# Patient Record
Sex: FEMALE | Race: BLACK OR AFRICAN AMERICAN | Employment: OTHER | URBAN - METROPOLITAN AREA
[De-identification: names, ages, dates, MRNs, and addresses within clinical notes are randomized per-mention and may not be internally consistent; named-entity substitution may affect disease eponyms.]

---

## 2017-02-09 ENCOUNTER — OFFICE VISIT (OUTPATIENT)
Dept: FAMILY MEDICINE CLINIC | Age: 42
End: 2017-02-09

## 2017-02-09 VITALS
TEMPERATURE: 97.4 F | WEIGHT: 259 LBS | BODY MASS INDEX: 37.08 KG/M2 | SYSTOLIC BLOOD PRESSURE: 131 MMHG | OXYGEN SATURATION: 98 % | HEART RATE: 72 BPM | RESPIRATION RATE: 16 BRPM | HEIGHT: 70 IN | DIASTOLIC BLOOD PRESSURE: 86 MMHG

## 2017-02-09 DIAGNOSIS — I10 ESSENTIAL HYPERTENSION, BENIGN: Primary | ICD-10-CM

## 2017-02-09 DIAGNOSIS — E78.5 HYPERLIPIDEMIA, UNSPECIFIED HYPERLIPIDEMIA TYPE: ICD-10-CM

## 2017-02-09 DIAGNOSIS — L84 HEEL CALLUS: ICD-10-CM

## 2017-02-09 DIAGNOSIS — E55.9 VITAMIN D DEFICIENCY: ICD-10-CM

## 2017-02-09 DIAGNOSIS — F51.02 ADJUSTMENT INSOMNIA: ICD-10-CM

## 2017-02-09 RX ORDER — TRAZODONE HYDROCHLORIDE 50 MG/1
TABLET ORAL
Qty: 90 TAB | Refills: 3 | Status: SHIPPED | OUTPATIENT
Start: 2017-02-09 | End: 2018-04-12 | Stop reason: SDUPTHER

## 2017-02-09 NOTE — MR AVS SNAPSHOT
Visit Information Date & Time Provider Department Dept. Phone Encounter #  
 2/9/2017  8:30 AM Bashir Mejía MD [address] [id number] Follow-up Instructions Return in about 3 months (around 5/9/2017) for follow up; schedule with Dr. Iwona Waggoner for follow up/PAP. Your Appointments 8/2/2017  8:30 AM  
Office Visit with Bashir Mejía MD  
[place] [address] ([facility]) Appt Note: mwv  
 [address]  
  
   
 [address] [facility] Upcoming Health Maintenance Date Due DTaP/Tdap/Td series (1 - Tdap) 8/29/1995 PAP AKA CERVICAL CYTOLOGY 4/10/2017 Allergies as of 2/9/2017  Review Complete On: 2/9/2017 By: Bashir Mejía MD  
 No Known Allergies Current Immunizations  Reviewed on 8/2/2016 No immunizations on file. Not reviewed this visit You Were Diagnosed With   
  
 Codes Comments Essential hypertension, benign    -  Primary ICD-10-CM: I10 
ICD-9-CM: 401.1 Hyperlipidemia, unspecified hyperlipidemia type     ICD-10-CM: E78.5 ICD-9-CM: 272.4 Vitamin D deficiency     ICD-10-CM: E55.9 ICD-9-CM: 268.9 Adjustment insomnia     ICD-10-CM: F51.02 
ICD-9-CM: 307.41 Heel callus     ICD-10-CM: L84 
ICD-9-CM: 078 Vitals BP Pulse Temp Resp Height(growth percentile) Weight(growth percentile) 131/86 72 97.4 °F (36.3 °C) (Oral) 16 5' 10\" (1.778 m) 259 lb (117.5 kg) LMP SpO2 BMI OB Status Smoking Status 02/02/2017 (Within Days) 98% 37.16 kg/m2 Having regular periods Current Every Day Smoker Vitals History BMI and BSA Data Body Mass Index Body Surface Area  
 37.16 kg/m 2 2.41 m 2 Preferred Pharmacy Pharmacy Name Phone [facility] [address] [facility], [address] [place] [address] [phone] Your Updated Medication List  
  
   
 This list is accurate as of: 2/9/17  8:51 AM.  Always use your most recent med list. amLODIPine 5 mg tablet Commonly known as:  Arlyss [place] Take 1 Tab by mouth daily. buPROPion  mg SR tablet Commonly known as:  WELLBUTRIN SR  
TAKE 1 TABLET BY MOUTH TWICE DAILY FOR SMOKING CESSATION  
  
 ergocalciferol 50,000 unit capsule Commonly known as:  ERGOCALCIFEROL Take 1 Cap by mouth every seven (7) days. traZODone 50 mg tablet Commonly known as:  DESYREL  
TAKE 1 TABLET BY MOUTH EVERY NIGHT  
  
 valsartan-hydroCHLOROthiazide 80-12.5 mg per tablet Commonly known as:  DIOVAN HCT Take 1 Tab by mouth daily. Prescriptions Sent to Pharmacy Refills  
 traZODone (DESYREL) 50 mg tablet 3 Sig: TAKE 1 TABLET BY MOUTH EVERY NIGHT Class: Normal  
 Pharmacy: [facility] [address] [place] [address] #: [phone] We Performed the Following REFERRAL TO PODIATRY [REF90 Custom] Comments:  
 Please evaluate patient for painful callus on the bottom of the right heel Follow-up Instructions Return in about 3 months (around 5/9/2017) for follow up; schedule with Dr. Gabby Haney for follow up/PAP. Referral Information Referral ID Referred By Referred To  
  
 [id number] Billy Ramirez Not Available Visits Status Start Date End Date 1 New Request 2/9/17 2/9/18 If your referral has a status of pending review or denied, additional information will be sent to support the outcome of this decision. Patient Instructions Corns and Calluses: Care Instructions Your Care Instructions Corns and calluses are areas of thick, hard, dead skin. They form to protect your skin from injury. Corns usually form where toes rub together. Calluses often form on the hands or feet. They may form wherever the skin rubs against something, such as shoes. In most cases, you can take steps at home to care for a corn or callus. Follow-up care is a key part of your treatment and safety. Be sure to make and go to all appointments, and call your doctor if you are having problems. It's also a good idea to know your test results and keep a list of the medicines you take. How can you care for yourself at home? · Wear shoes and other footwear that fit correctly and are roomy. This will reduce rubbing and give corns or calluses time to heal. 
· Use protective pads, such as moleskin, to cushion the callus or corn. · Soften the corn or callus and remove the dead skin by using over-the-counter products such as salicylic acid. If you have a condition that causes problems with blood flow (such as coronary artery disease) or loss of feeling in your feet (such as diabetes), talk to your doctor before you try any home treatment. · Soak your corn or callus in warm water, and then use a pumice stone to rub dead skin away. · Wash your feet regularly, and rub lotion into your feet while they are still moist. Dry skin can cause a callus to crack and bleed. · Never cut the corn or callus yourself, especially if you have problems with blood flow to your legs or feet. When should you call for help? Call your doctor now or seek immediate medical care if: 
· You have signs of infection, such as: 
¨ Increased pain, swelling, warmth, or redness around the corn or callus. ¨ Red streaks leading from the corn or callus. ¨ Pus draining from the corn or callus. ¨ A fever. Watch closely for changes in your health, and be sure to contact your doctor if: 
· You have a blood flow problem, such as diabetes, and your corn or callus is bothering you. · You do not get better as expected. Where can you learn more? Go to http://noelle-vaughn.info/. Enter R244 in the search box to learn more about \"Corns and Calluses: Care Instructions. \" Current as of: February 5, 2016 Content Version: 11.1 © 3887-8645 [employer]. Care instructions adapted under license by [employer] (which disclaims liability or warranty for this information). If you have questions about a medical condition or this instruction, always ask your healthcare professional. [address] any warranty or liability for your use of this information. Introducing [facility]! Dear Zunilda Singletary: Thank you for requesting a [employer] account. Our records indicate that you already have an active [employer] account. You can access your account anytime at [email or web address]. [email or web address]/7 Did you know that you can access your hospital and ER discharge instructions at any time in Global [employer]? You can also review all of your test results from your hospital stay or ER visit. Additional Information If you have questions, please visit the Frequently Asked Questions section of the [employer] website at [email or web address] Remember, [employer] is NOT to be used for urgent needs. For medical emergencies, dial 911. Now available from your iPhone and Android! Please provide this summary of care documentation to your next provider. Your primary care clinician is listed as Sj Angel. If you have any questions after today's visit, please call [phone].

## 2017-02-09 NOTE — PROGRESS NOTES
Lucas Flower is here today to follow up for chronic conditions. 1. Have you been to the ER, urgent care clinic since your last visit? Hospitalized since your last visit? No    2. Have you seen or consulted any other health care providers outside of the [address] [place] [address] since your last visit? Include any pap smears or colon screening.  No
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
## 2017-02-09 NOTE — PATIENT INSTRUCTIONS
Corns and Calluses: Care Instructions  Your Care Instructions  Corns and calluses are areas of thick, hard, dead skin. They form to protect your skin from injury. Corns usually form where toes rub together. Calluses often form on the hands or feet. They may form wherever the skin rubs against something, such as shoes. In most cases, you can take steps at home to care for a corn or callus. Follow-up care is a key part of your treatment and safety. Be sure to make and go to all appointments, and call your doctor if you are having problems. It's also a good idea to know your test results and keep a list of the medicines you take. How can you care for yourself at home? · Wear shoes and other footwear that fit correctly and are roomy. This will reduce rubbing and give corns or calluses time to heal.  · Use protective pads, such as moleskin, to cushion the callus or corn. · Soften the corn or callus and remove the dead skin by using over-the-counter products such as salicylic acid. If you have a condition that causes problems with blood flow (such as coronary artery disease) or loss of feeling in your feet (such as diabetes), talk to your doctor before you try any home treatment. · Soak your corn or callus in warm water, and then use a pumice stone to rub dead skin away. · Wash your feet regularly, and rub lotion into your feet while they are still moist. Dry skin can cause a callus to crack and bleed. · Never cut the corn or callus yourself, especially if you have problems with blood flow to your legs or feet. When should you call for help? Call your doctor now or seek immediate medical care if:  · You have signs of infection, such as:  ¨ Increased pain, swelling, warmth, or redness around the corn or callus. ¨ Red streaks leading from the corn or callus. ¨ Pus draining from the corn or callus. ¨ A fever.   Watch closely for changes in your health, and be sure to contact your doctor if:  · You have a blood flow problem, such as diabetes, and your corn or callus is bothering you. · You do not get better as expected. Where can you learn more? Go to http://noelle-vaughn.info/. Enter R244 in the search box to learn more about \"Corns and Calluses: Care Instructions. \"  Current as of: February 5, 2016  Content Version: 11.1  © 5269-8725 [employer]. Care instructions adapted under license by [employer] (which disclaims liability or warranty for this information). If you have questions about a medical condition or this instruction, always ask your healthcare professional. [address] any warranty or liability for your use of this information.

## 2017-04-04 DIAGNOSIS — E55.9 VITAMIN D DEFICIENCY: ICD-10-CM

## 2017-04-04 RX ORDER — ERGOCALCIFEROL 1.25 MG/1
CAPSULE ORAL
Qty: 12 CAP | Refills: 0 | Status: SHIPPED | OUTPATIENT
Start: 2017-04-04 | End: 2017-10-29 | Stop reason: SDUPTHER

## 2017-04-19 ENCOUNTER — TELEPHONE (OUTPATIENT)
Dept: FAMILY MEDICINE CLINIC | Age: 42
End: 2017-04-19

## 2017-04-19 NOTE — TELEPHONE ENCOUNTER
Patient states she cant see out of her eye and needs a referral to [facility] TODAY. Please call [phone]

## 2017-04-20 ENCOUNTER — HOSPITAL ENCOUNTER (EMERGENCY)
Age: 42
Discharge: HOME | End: 2017-04-20
Attending: EMERGENCY MEDICINE
Payer: MEDICARE

## 2017-04-20 ENCOUNTER — APPOINTMENT (OUTPATIENT)
Dept: GENERAL RADIOLOGY | Age: 42
End: 2017-04-20
Attending: EMERGENCY MEDICINE
Payer: MEDICARE

## 2017-04-20 VITALS
HEART RATE: 105 BPM | OXYGEN SATURATION: 100 % | HEIGHT: 70 IN | BODY MASS INDEX: 36.22 KG/M2 | WEIGHT: 253 LBS | DIASTOLIC BLOOD PRESSURE: 100 MMHG | SYSTOLIC BLOOD PRESSURE: 155 MMHG | TEMPERATURE: 98.9 F | RESPIRATION RATE: 16 BRPM

## 2017-04-20 DIAGNOSIS — H54.7 VISION LOSS: Primary | ICD-10-CM

## 2017-04-20 DIAGNOSIS — S90.122A CONTUSION OF LEFT LESSER TOE(S) WITHOUT DAMAGE TO NAIL, INITIAL ENCOUNTER: Primary | ICD-10-CM

## 2017-04-20 DIAGNOSIS — I10 HYPERTENSION, UNCONTROLLED: ICD-10-CM

## 2017-04-20 PROCEDURE — 99282 EMERGENCY DEPT VISIT SF MDM: CPT

## 2017-04-20 PROCEDURE — 77030036687 HC SHOE PSTOP S2SG -A

## 2017-04-20 PROCEDURE — 73630 X-RAY EXAM OF FOOT: CPT

## 2017-04-20 PROCEDURE — 74011250637 HC RX REV CODE- 250/637: Performed by: EMERGENCY MEDICINE

## 2017-04-20 RX ORDER — IBUPROFEN 400 MG/1
800 TABLET ORAL
Status: COMPLETED | OUTPATIENT
Start: 2017-04-20 | End: 2017-04-20

## 2017-04-20 RX ORDER — OXYCODONE AND ACETAMINOPHEN 5; 325 MG/1; MG/1
1 TABLET ORAL
Status: DISCONTINUED | OUTPATIENT
Start: 2017-04-20 | End: 2017-04-20

## 2017-04-20 RX ORDER — KETOROLAC TROMETHAMINE 10 MG/1
10 TABLET, FILM COATED ORAL EVERY 8 HOURS
Qty: 15 TAB | Refills: 0 | Status: SHIPPED | OUTPATIENT
Start: 2017-04-20 | End: 2017-04-25

## 2017-04-20 RX ORDER — HYDROCODONE BITARTRATE AND ACETAMINOPHEN 5; 325 MG/1; MG/1
TABLET ORAL
Qty: 20 TAB | Refills: 0 | Status: SHIPPED | OUTPATIENT
Start: 2017-04-20 | End: 2017-04-20

## 2017-04-20 RX ADMIN — IBUPROFEN 800 MG: 400 TABLET, FILM COATED ORAL at 18:46
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
## 2017-04-20 NOTE — ED NOTES
Patient states,\"I don't take no percocet. You all should know that. \" Offered support to patient, notified DR Terence Abreu.

## 2017-04-20 NOTE — ED NOTES
Patient states,\"I don't understand how come it's taking so long. You people are crazy here. Patients got shit to do, too. No reason I should be waiting around. I esa here for over three hours. \" Offered support to patient, notified DR Vincent Tesfaye. Called radiology department to inquire when patient would be able to come to radiology, radiology tech [facility] states she will come to retreive patient in a few minutes.

## 2017-04-21 NOTE — TELEPHONE ENCOUNTER
Received [employer] authorization, auth # [id number], patient approved for 99 visits with Dr. Eliseo Boateng. Start date 04/20/18 End date 04/20/18. Bao [place] authorization to [facility] @  Fax [phone] with confirmation of receipt received.

## 2017-04-21 NOTE — DISCHARGE INSTRUCTIONS
DASH Diet: Care Instructions  Your Care Instructions  The DASH diet is an eating plan that can help lower your blood pressure. DASH stands for Dietary Approaches to Stop Hypertension. Hypertension is high blood pressure. The DASH diet focuses on eating foods that are high in calcium, potassium, and magnesium. These nutrients can lower blood pressure. The foods that are highest in these nutrients are fruits, vegetables, low-fat dairy products, nuts, seeds, and legumes. But taking calcium, potassium, and magnesium supplements instead of eating foods that are high in those nutrients does not have the same effect. The DASH diet also includes whole grains, fish, and poultry. The DASH diet is one of several lifestyle changes your doctor may recommend to lower your high blood pressure. Your doctor may also want you to decrease the amount of sodium in your diet. Lowering sodium while following the DASH diet can lower blood pressure even further than just the DASH diet alone. Follow-up care is a key part of your treatment and safety. Be sure to make and go to all appointments, and call your doctor if you are having problems. It's also a good idea to know your test results and keep a list of the medicines you take. How can you care for yourself at home? Following the DASH diet  · Eat 4 to 5 servings of fruit each day. A serving is 1 medium-sized piece of fruit, ½ cup chopped or canned fruit, 1/4 cup dried fruit, or 4 ounces (½ cup) of fruit juice. Choose fruit more often than fruit juice. · Eat 4 to 5 servings of vegetables each day. A serving is 1 cup of lettuce or raw leafy vegetables, ½ cup of chopped or cooked vegetables, or 4 ounces (½ cup) of vegetable juice. Choose vegetables more often than vegetable juice. · Get 2 to 3 servings of low-fat and fat-free dairy each day. A serving is 8 ounces of milk, 1 cup of yogurt, or 1 ½ ounces of cheese. · Eat 6 to 8 servings of grains each day.  A serving is 1 slice of bread, 1 ounce of dry cereal, or ½ cup of cooked rice, pasta, or cooked cereal. Try to choose whole-grain products as much as possible. · Limit lean meat, poultry, and fish to 2 servings each day. A serving is 3 ounces, about the size of a deck of cards. · Eat 4 to 5 servings of nuts, seeds, and legumes (cooked dried beans, lentils, and split peas) each week. A serving is 1/3 cup of nuts, 2 tablespoons of seeds, or ½ cup of cooked beans or peas. · Limit fats and oils to 2 to 3 servings each day. A serving is 1 teaspoon of vegetable oil or 2 tablespoons of salad dressing. · Limit sweets and added sugars to 5 servings or less a week. A serving is 1 tablespoon jelly or jam, ½ cup sorbet, or 1 cup of lemonade. · Eat less than 2,300 milligrams (mg) of sodium a day. If you limit your sodium to 1,500 mg a day, you can lower your blood pressure even more. Tips for success  · Start small. Do not try to make dramatic changes to your diet all at once. You might feel that you are missing out on your favorite foods and then be more likely to not follow the plan. Make small changes, and stick with them. Once those changes become habit, add a few more changes. · Try some of the following:  ¨ Make it a goal to eat a fruit or vegetable at every meal and at snacks. This will make it easy to get the recommended amount of fruits and vegetables each day. ¨ Try yogurt topped with fruit and nuts for a snack or healthy dessert. ¨ Add lettuce, tomato, cucumber, and onion to sandwiches. ¨ Combine a ready-made pizza crust with low-fat mozzarella cheese and lots of vegetable toppings. Try using tomatoes, squash, spinach, broccoli, carrots, cauliflower, and onions. ¨ Have a variety of cut-up vegetables with a low-fat dip as an appetizer instead of chips and dip. ¨ Sprinkle sunflower seeds or chopped almonds over salads. Or try adding chopped walnuts or almonds to cooked vegetables. ¨ Try some vegetarian meals using beans and peas. Add garbanzo or kidney beans to salads. Make burritos and tacos with mashed medel beans or black beans. Where can you learn more? Go to http://noelle-vaughn.info/. Enter U165 in the search box to learn more about \"DASH Diet: Care Instructions. \"  Current as of: March 23, 2016  Content Version: 11.2  © 7314-4899 [employer]. Care instructions adapted under license by [employer] (which disclaims liability or warranty for this information). If you have questions about a medical condition or this instruction, always ask your healthcare professional. [place] [address] any warranty or liability for your use of this information. High Blood Pressure: Care Instructions  Your Care Instructions  If your blood pressure is usually above 140/90, you have high blood pressure, or hypertension. That means the top number is 140 or higher or the bottom number is 90 or higher, or both. Despite what a lot of people think, high blood pressure usually doesn't cause headaches or make you feel dizzy or lightheaded. It usually has no symptoms. But it does increase your risk for heart attack, stroke, and kidney or eye damage. The higher your blood pressure, the more your risk increases. Your doctor will give you a goal for your blood pressure. Your goal will be based on your health and your age. An example of a goal is to keep your blood pressure below 140/90. Lifestyle changes, such as eating healthy and being active, are always important to help lower blood pressure. You might also take medicine to reach your blood pressure goal.  Follow-up care is a key part of your treatment and safety. Be sure to make and go to all appointments, and call your doctor if you are having problems. It's also a good idea to know your test results and keep a list of the medicines you take. How can you care for yourself at home?   Medical treatment  · If you stop taking your medicine, your blood pressure will go back up. You may take one or more types of medicine to lower your blood pressure. Be safe with medicines. Take your medicine exactly as prescribed. Call your doctor if you think you are having a problem with your medicine. · Talk to your doctor before you start taking aspirin every day. Aspirin can help certain people lower their risk of a heart attack or stroke. But taking aspirin isn't right for everyone, because it can cause serious bleeding. · See your doctor regularly. You may need to see the doctor more often at first or until your blood pressure comes down. · If you are taking blood pressure medicine, talk to your doctor before you take decongestants or anti-inflammatory medicine, such as ibuprofen. Some of these medicines can raise blood pressure. · Learn how to check your blood pressure at home. Lifestyle changes  · Stay at a healthy weight. This is especially important if you put on weight around the waist. Losing even 10 pounds can help you lower your blood pressure. · If your doctor recommends it, get more exercise. Walking is a good choice. Bit by bit, increase the amount you walk every day. Try for at least 30 minutes on most days of the week. You also may want to swim, bike, or do other activities. · Avoid or limit alcohol. Talk to your doctor about whether you can drink any alcohol. · Try to limit how much sodium you eat to less than 2,300 milligrams (mg) a day. Your doctor may ask you to try to eat less than 1,500 mg a day. · Eat plenty of fruits (such as bananas and oranges), vegetables, legumes, whole grains, and low-fat dairy products. · Lower the amount of saturated fat in your diet. Saturated fat is found in animal products such as milk, cheese, and meat. Limiting these foods may help you lose weight and also lower your risk for heart disease. · Do not smoke. Smoking increases your risk for heart attack and stroke.  If you need help quitting, talk to your doctor about stop-smoking programs and medicines. These can increase your chances of quitting for good. When should you call for help? Call 911 anytime you think you may need emergency care. This may mean having symptoms that suggest that your blood pressure is causing a serious heart or blood vessel problem. Your blood pressure may be over 180/110. For example, call 911 if:  · You have symptoms of a heart attack. These may include:  ¨ Chest pain or pressure, or a strange feeling in the chest.  ¨ Sweating. ¨ Shortness of breath. ¨ Nausea or vomiting. ¨ Pain, pressure, or a strange feeling in the back, neck, jaw, or upper belly or in one or both shoulders or arms. ¨ Lightheadedness or sudden weakness. ¨ A fast or irregular heartbeat. · You have symptoms of a stroke. These may include:  ¨ Sudden numbness, tingling, weakness, or loss of movement in your face, arm, or leg, especially on only one side of your body. ¨ Sudden vision changes. ¨ Sudden trouble speaking. ¨ Sudden confusion or trouble understanding simple statements. ¨ Sudden problems with walking or balance. ¨ A sudden, severe headache that is different from past headaches. · You have severe back or belly pain. Do not wait until your blood pressure comes down on its own. Get help right away. Call your doctor now or seek immediate care if:  · Your blood pressure is much higher than normal (such as 180/110 or higher), but you don't have symptoms. · You think high blood pressure is causing symptoms, such as:  ¨ Severe headache. ¨ Blurry vision. Watch closely for changes in your health, and be sure to contact your doctor if:  · Your blood pressure measures 140/90 or higher at least 2 times. That means the top number is 140 or higher or the bottom number is 90 or higher, or both. · You think you may be having side effects from your blood pressure medicine. · Your blood pressure is usually normal, but it goes above normal at least 2 times.   Where can you learn more? Go to http://noelle-vaughn.info/. Enter M205 in the search box to learn more about \"High Blood Pressure: Care Instructions. \"  Current as of: August 8, 2016  Content Version: 11.2  © 4723-8151 [employer]. Care instructions adapted under license by [employer] (which disclaims liability or warranty for this information). If you have questions about a medical condition or this instruction, always ask your healthcare professional. [place] [address] any warranty or liability for your use of this information.

## 2017-04-24 ENCOUNTER — TELEPHONE (OUTPATIENT)
Dept: FAMILY MEDICINE CLINIC | Age: 42
End: 2017-04-24

## 2017-04-24 NOTE — TELEPHONE ENCOUNTER
Calling because she broke her 4 & 5 toes on her left foot a few days ago.  Went to [facility], she has been referred to [place] [facility] but they are not scheduling her an appt without a referral. She can be reached at [phone]

## 2017-04-24 NOTE — ED NOTES
8:19 AM  Pt called back for radiology over read referred to Dr Femi Xie for ED follow up for FX toes.

## 2017-04-25 ENCOUNTER — TELEPHONE (OUTPATIENT)
Dept: FAMILY MEDICINE CLINIC | Age: 42
End: 2017-04-25

## 2017-04-25 DIAGNOSIS — S92.515A: Primary | ICD-10-CM

## 2017-04-25 NOTE — TELEPHONE ENCOUNTER
Patient's [employer] authorized, and her appointment scheduled for tomorrow with Dr Dayanara Wilks. Patient advised, very happy.

## 2017-04-25 NOTE — TELEPHONE ENCOUNTER
Patient states she needs a referral to [address] [place] [address] at [place]. She states she broke two toes and they wont allow her to make a appointment until they get the referral.Please fax to [phone] or [phone]

## 2017-04-28 NOTE — TELEPHONE ENCOUNTER
Referral for [address] was obtained on 04/25/17 for Dr. Scott Farmer for patient to be seen.  Documented in referral.

## 2017-05-09 ENCOUNTER — OFFICE VISIT (OUTPATIENT)
Dept: FAMILY MEDICINE CLINIC | Age: 42
End: 2017-05-09

## 2017-05-09 VITALS
HEIGHT: 70 IN | TEMPERATURE: 97.7 F | WEIGHT: 258 LBS | HEART RATE: 76 BPM | DIASTOLIC BLOOD PRESSURE: 80 MMHG | SYSTOLIC BLOOD PRESSURE: 146 MMHG | RESPIRATION RATE: 16 BRPM | BODY MASS INDEX: 36.94 KG/M2

## 2017-05-09 DIAGNOSIS — J01.90 ACUTE SINUSITIS, UNSPECIFIED: ICD-10-CM

## 2017-05-09 DIAGNOSIS — E55.9 VITAMIN D DEFICIENCY: ICD-10-CM

## 2017-05-09 DIAGNOSIS — I10 ESSENTIAL HYPERTENSION, BENIGN: ICD-10-CM

## 2017-05-09 DIAGNOSIS — M79.671 PAIN IN RIGHT FOOT: Primary | ICD-10-CM

## 2017-05-09 DIAGNOSIS — Z72.0 TOBACCO ABUSE: ICD-10-CM

## 2017-05-09 DIAGNOSIS — H10.11 ALLERGIC CONJUNCTIVITIS, RIGHT EYE: ICD-10-CM

## 2017-05-09 DIAGNOSIS — R73.01 IMPAIRED FASTING GLUCOSE: ICD-10-CM

## 2017-05-09 DIAGNOSIS — E78.5 HYPERLIPIDEMIA, UNSPECIFIED HYPERLIPIDEMIA TYPE: ICD-10-CM

## 2017-05-09 RX ORDER — IBUPROFEN 200 MG
TABLET ORAL
COMMUNITY
End: 2018-04-12 | Stop reason: ALTCHOICE

## 2017-05-09 RX ORDER — AZITHROMYCIN 250 MG/1
TABLET, FILM COATED ORAL
Qty: 6 TAB | Refills: 0 | Status: SHIPPED | OUTPATIENT
Start: 2017-05-09 | End: 2017-05-14

## 2017-05-09 RX ORDER — DIPHENHYDRAMINE HCL 25 MG
25 CAPSULE ORAL AS NEEDED
COMMUNITY
End: 2019-11-04

## 2017-05-09 NOTE — MR AVS SNAPSHOT
Visit Information Date & Time Provider Department Dept. Phone Encounter #  
 5/9/2017  8:45 AM Randy Gil MD [facility] [id number] Follow-up Instructions Return in about 3 months (around 8/9/2017) for follow up. Routing History Your Appointments 8/2/2017  8:30 AM  
Office Visit with Randy Gil MD  
[place] [address] ([facility]) Appt Note: mwv  
 [address]  
  
   
 [address] Upcoming Health Maintenance Date Due DTaP/Tdap/Td series (1 - Tdap) 8/29/1995 PAP AKA CERVICAL CYTOLOGY 4/10/2017 INFLUENZA AGE 9 TO ADULT 8/1/2017 Allergies as of 5/9/2017  Review Complete On: 5/9/2017 By: Randy Gil MD  
 No Known Allergies Current Immunizations  Reviewed on 8/2/2016 No immunizations on file. Not reviewed this visit You Were Diagnosed With   
  
 Codes Comments Pain in right foot    -  Primary ICD-10-CM: F50.172 ICD-9-CM: 729.5 Essential hypertension, benign     ICD-10-CM: I10 
ICD-9-CM: 401.1 Tobacco abuse     ICD-10-CM: Z72.0 ICD-9-CM: 305.1 Hyperlipidemia, unspecified hyperlipidemia type     ICD-10-CM: E78.5 ICD-9-CM: 272.4 Vitamin D deficiency     ICD-10-CM: E55.9 ICD-9-CM: 268.9 Impaired fasting glucose     ICD-10-CM: R73.01 
ICD-9-CM: 790.21 Allergic conjunctivitis, right eye     ICD-10-CM: H10.11 ICD-9-CM: 372.14 Acute sinusitis, unspecified     ICD-10-CM: J01.90 ICD-9-CM: 461.9 Vitals BP Pulse Temp Resp Height(growth percentile) Weight(growth percentile) 146/80 76 97.7 °F (36.5 °C) (Oral) 16 5' 10\" (1.778 m) 258 lb (117 kg) LMP BMI OB Status Smoking Status 05/04/2017 37.02 kg/m2 Having regular periods Current Every Day Smoker BMI and BSA Data Body Mass Index Body Surface Area  37.02 kg/m 2 2.4 m 2  
  
  
 Preferred Pharmacy Pharmacy Name Phone [facility] [address] [phone] Your Updated Medication List  
  
   
This list is accurate as of: 5/9/17  9:14 AM.  Always use your most recent med list. amLODIPine 5 mg tablet Commonly known as:  Paul Luong Take 1 Tab by mouth daily. azithromycin 250 mg tablet Commonly known as:  Spalding Margo Take 2 tablets today, then take 1 tablet daily BENADRYL 25 mg capsule Generic drug:  diphenhydrAMINE Take 25 mg by mouth two (2) times a day. buPROPion  mg SR tablet Commonly known as:  WELLBUTRIN SR  
TAKE 1 TABLET BY MOUTH TWICE DAILY FOR SMOKING CESSATION  
  
 ergocalciferol 50,000 unit capsule Commonly known as:  ERGOCALCIFEROL  
TAKE 1 CAPSULE BY MOUTH EVERY 7 DAYS  
  
 ibuprofen 200 mg tablet Commonly known as:  MOTRIN Take  by mouth every eight (8) hours as needed for Pain. traZODone 50 mg tablet Commonly known as:  DESYREL  
TAKE 1 TABLET BY MOUTH EVERY NIGHT  
  
 valsartan-hydroCHLOROthiazide 80-12.5 mg per tablet Commonly known as:  DIOVAN HCT Take 1 Tab by mouth daily. Prescriptions Sent to Pharmacy Refills  
 azithromycin (ZITHROMAX) 250 mg tablet 0 Sig: Take 2 tablets today, then take 1 tablet daily Class: Normal  
 Pharmacy: [facility] [address] [place] [address], [address] [place] [address] #: [phone] Follow-up Instructions Return in about 3 months (around 8/9/2017) for follow up. To-Do List   
 05/09/2017 Imaging:  XR FOOT RT MIN 3 V   
  
 05/09/2017 9:15 AM  
  Appointment with [facility] RAD XR RM 1 at [address] [place] [address] ([phone]) Patient Instructions DASH Diet: Care Instructions Your Care Instructions The DASH diet is an eating plan that can help lower your blood pressure. DASH stands for Dietary Approaches to Stop Hypertension. Hypertension is high blood pressure. The DASH diet focuses on eating foods that are high in calcium, potassium, and magnesium. These nutrients can lower blood pressure. The foods that are highest in these nutrients are fruits, vegetables, low-fat dairy products, nuts, seeds, and legumes. But taking calcium, potassium, and magnesium supplements instead of eating foods that are high in those nutrients does not have the same effect. The DASH diet also includes whole grains, fish, and poultry. The DASH diet is one of several lifestyle changes your doctor may recommend to lower your high blood pressure. Your doctor may also want you to decrease the amount of sodium in your diet. Lowering sodium while following the DASH diet can lower blood pressure even further than just the DASH diet alone. Follow-up care is a key part of your treatment and safety. Be sure to make and go to all appointments, and call your doctor if you are having problems. It's also a good idea to know your test results and keep a list of the medicines you take. How can you care for yourself at home? Following the DASH diet · Eat 4 to 5 servings of fruit each day. A serving is 1 medium-sized piece of fruit, ½ cup chopped or canned fruit, 1/4 cup dried fruit, or 4 ounces (½ cup) of fruit juice. Choose fruit more often than fruit juice. · Eat 4 to 5 servings of vegetables each day. A serving is 1 cup of lettuce or raw leafy vegetables, ½ cup of chopped or cooked vegetables, or 4 ounces (½ cup) of vegetable juice. Choose vegetables more often than vegetable juice. · Get 2 to 3 servings of low-fat and fat-free dairy each day. A serving is 8 ounces of milk, 1 cup of yogurt, or 1 ½ ounces of cheese. · Eat 6 to 8 servings of grains each day.  A serving is 1 slice of bread, 1 ounce of dry cereal, or ½ cup of cooked rice, pasta, or cooked cereal. Try to choose whole-grain products as much as possible. · Limit lean meat, poultry, and fish to 2 servings each day. A serving is 3 ounces, about the size of a deck of cards. · Eat 4 to 5 servings of nuts, seeds, and legumes (cooked dried beans, lentils, and split peas) each week. A serving is 1/3 cup of nuts, 2 tablespoons of seeds, or ½ cup of cooked beans or peas. · Limit fats and oils to 2 to 3 servings each day. A serving is 1 teaspoon of vegetable oil or 2 tablespoons of salad dressing. · Limit sweets and added sugars to 5 servings or less a week. A serving is 1 tablespoon jelly or jam, ½ cup sorbet, or 1 cup of lemonade. · Eat less than 2,300 milligrams (mg) of sodium a day. If you limit your sodium to 1,500 mg a day, you can lower your blood pressure even more. Tips for success · Start small. Do not try to make dramatic changes to your diet all at once. You might feel that you are missing out on your favorite foods and then be more likely to not follow the plan. Make small changes, and stick with them. Once those changes become habit, add a few more changes. · Try some of the following: ¨ Make it a goal to eat a fruit or vegetable at every meal and at snacks. This will make it easy to get the recommended amount of fruits and vegetables each day. ¨ Try yogurt topped with fruit and nuts for a snack or healthy dessert. ¨ Add lettuce, tomato, cucumber, and onion to sandwiches. ¨ Combine a ready-made pizza crust with low-fat mozzarella cheese and lots of vegetable toppings. Try using tomatoes, squash, spinach, broccoli, carrots, cauliflower, and onions. ¨ Have a variety of cut-up vegetables with a low-fat dip as an appetizer instead of chips and dip. ¨ Sprinkle sunflower seeds or chopped almonds over salads. Or try adding chopped walnuts or almonds to cooked vegetables. ¨ Try some vegetarian meals using beans and peas.  Add garbanzo or kidney beans to salads. Make burritos and tacos with mashed medel beans or black beans. Where can you learn more? Go to http://noelle-vaughn.info/. Enter J399 in the search box to learn more about \"DASH Diet: Care Instructions. \" Current as of: March 23, 2016 Content Version: 11.2 © 9155-0823 [employer]. Care instructions adapted under license by [employer] (which disclaims liability or warranty for this information). If you have questions about a medical condition or this instruction, always ask your healthcare professional. [place] [address] any warranty or liability for your use of this information. Introducing [facility]! Dear Celine Casillas: Thank you for requesting a [employer] account. Our records indicate that you already have an active Collect[employer] account. You can access your account anytime at [email or web address] Did you know that you can access your hospital and ER discharge instructions at any time in Collect[employer]ias? You can also review all of your test results from your hospital stay or ER visit. Additional Information If you have questions, please visit the Frequently Asked Questions section of the Collective [employer]ias website at [email or web address] Remember, Collective Bias is NOT to be used for urgent needs. For medical emergencies, dial 911. Now available from your iPhone and Android! Please provide this summary of care documentation to your next provider. Your primary care clinician is listed as Jada Lopez. If you have any questions after today's visit, please call [phone].

## 2017-05-09 NOTE — PROGRESS NOTES
1. Have you been to the ER, urgent care clinic since your last visit? Hospitalized since your last visit? Yes, went to [place] [address] on 4/2017 for fractured 4th and 5th digit left foot. 2. Have you seen or consulted any other health care providers outside of the [address] [place] [address] since your last visit? Include any pap smears or colon screening. Yes, saw Dr. Amy Domingo on 4/26/2017. Patient presents with follow up Hypertension, Hyperlipidemia, Vitamin D deficiency and Nicotine dependence.
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
## 2017-05-09 NOTE — PATIENT INSTRUCTIONS

## 2017-05-10 LAB
25(OH)D3+25(OH)D2 SERPL-MCNC: 14.2 NG/ML (ref 30–100)
ALBUMIN SERPL-MCNC: 4.5 G/DL (ref 3.5–5.5)
ALBUMIN/GLOB SERPL: 1.7 {RATIO} (ref 1.2–2.2)
ALP SERPL-CCNC: 123 IU/L (ref 39–117)
ALT SERPL-CCNC: 40 IU/L (ref 0–32)
AST SERPL-CCNC: 30 IU/L (ref 0–40)
BASOPHILS # BLD AUTO: 0 X10E3/UL (ref 0–0.2)
BASOPHILS NFR BLD AUTO: 0 %
BILIRUB SERPL-MCNC: 0.3 MG/DL (ref 0–1.2)
BUN SERPL-MCNC: 12 MG/DL (ref 6–24)
BUN/CREAT SERPL: 11 (ref 9–23)
CALCIUM SERPL-MCNC: 9.4 MG/DL (ref 8.7–10.2)
CHLORIDE SERPL-SCNC: 99 MMOL/L (ref 96–106)
CHOLEST SERPL-MCNC: 201 MG/DL (ref 100–199)
CO2 SERPL-SCNC: 24 MMOL/L (ref 18–29)
CREAT SERPL-MCNC: 1.06 MG/DL (ref 0.57–1)
EOSINOPHIL # BLD AUTO: 0.1 X10E3/UL (ref 0–0.4)
EOSINOPHIL NFR BLD AUTO: 2 %
ERYTHROCYTE [DISTWIDTH] IN BLOOD BY AUTOMATED COUNT: 13.2 % (ref 12.3–15.4)
GLOBULIN SER CALC-MCNC: 2.7 G/DL (ref 1.5–4.5)
GLUCOSE SERPL-MCNC: 107 MG/DL (ref 65–99)
HBA1C MFR BLD: 5.9 % (ref 4.8–5.6)
HCT VFR BLD AUTO: 38.8 % (ref 34–46.6)
HDLC SERPL-MCNC: 50 MG/DL
HGB BLD-MCNC: 12.4 G/DL (ref 11.1–15.9)
IMM GRANULOCYTES # BLD: 0 X10E3/UL (ref 0–0.1)
IMM GRANULOCYTES NFR BLD: 0 %
INTERPRETATION, 910389: NORMAL
LDLC SERPL CALC-MCNC: 116 MG/DL (ref 0–99)
LYMPHOCYTES # BLD AUTO: 2.9 X10E3/UL (ref 0.7–3.1)
LYMPHOCYTES NFR BLD AUTO: 55 %
MCH RBC QN AUTO: 28 PG (ref 26.6–33)
MCHC RBC AUTO-ENTMCNC: 32 G/DL (ref 31.5–35.7)
MCV RBC AUTO: 88 FL (ref 79–97)
MONOCYTES # BLD AUTO: 0.4 X10E3/UL (ref 0.1–0.9)
MONOCYTES NFR BLD AUTO: 7 %
NEUTROPHILS # BLD AUTO: 2 X10E3/UL (ref 1.4–7)
NEUTROPHILS NFR BLD AUTO: 36 %
PLATELET # BLD AUTO: 388 X10E3/UL (ref 150–379)
POTASSIUM SERPL-SCNC: 4.6 MMOL/L (ref 3.5–5.2)
PROT SERPL-MCNC: 7.2 G/DL (ref 6–8.5)
RBC # BLD AUTO: 4.43 X10E6/UL (ref 3.77–5.28)
SODIUM SERPL-SCNC: 137 MMOL/L (ref 134–144)
TRIGL SERPL-MCNC: 173 MG/DL (ref 0–149)
VLDLC SERPL CALC-MCNC: 35 MG/DL (ref 5–40)
WBC # BLD AUTO: 5.4 X10E3/UL (ref 3.4–10.8)

## 2017-05-11 DIAGNOSIS — Z12.4 SCREENING FOR CERVICAL CANCER: Primary | ICD-10-CM

## 2017-05-11 NOTE — PROGRESS NOTES
pls let patient know labs came back normal except for low Vit D, slightly elevated blood sugar-prediabetes denie

## 2017-05-12 ENCOUNTER — TELEPHONE (OUTPATIENT)
Dept: FAMILY MEDICINE CLINIC | Age: 42
End: 2017-05-12

## 2017-05-12 NOTE — TELEPHONE ENCOUNTER
Called and spoke with Kenna Thom. Verified two patient identifiers. Informed patient of lab and foot x-ray results per provider. Made patient aware that her labs showed low Vit D and elevated A1C at prediabetic range, instructed patient to decrease her carb intake. Informed patient that her foot x-ray was normal. Patient verbalized understanding.

## 2017-07-06 ENCOUNTER — HOSPITAL ENCOUNTER (OUTPATIENT)
Dept: LAB | Age: 42
Discharge: HOME | End: 2017-07-06
Payer: MEDICARE

## 2017-07-06 ENCOUNTER — OFFICE VISIT (OUTPATIENT)
Dept: OBGYN CLINIC | Age: 42
End: 2017-07-06

## 2017-07-06 VITALS
HEART RATE: 74 BPM | HEIGHT: 70 IN | BODY MASS INDEX: 36.94 KG/M2 | WEIGHT: 258 LBS | DIASTOLIC BLOOD PRESSURE: 96 MMHG | SYSTOLIC BLOOD PRESSURE: 145 MMHG

## 2017-07-06 DIAGNOSIS — N92.6 IRREGULAR BLEEDING: ICD-10-CM

## 2017-07-06 DIAGNOSIS — Z01.419 WELL WOMAN EXAM WITH ROUTINE GYNECOLOGICAL EXAM: Primary | ICD-10-CM

## 2017-07-06 PROCEDURE — 87624 HPV HI-RISK TYP POOLED RSLT: CPT | Performed by: OBSTETRICS & GYNECOLOGY

## 2017-07-06 PROCEDURE — 88175 CYTOPATH C/V AUTO FLUID REDO: CPT | Performed by: OBSTETRICS & GYNECOLOGY

## 2017-07-06 NOTE — MR AVS SNAPSHOT
Visit Information Date & Time Provider Department Dept. Phone Encounter #  
 7/6/2017  9:00 [facility] [phone] [id number] Follow-up Instructions Return in about 2 weeks (around 7/20/2017) for results. Your Appointments 8/8/2017  9:15 AM  
Office Visit with Daria Padilla MD  
[place] [address] ([place]) Appt Note: mwv; mwv and 3 mo f/u  
 [facility]  
  
   
 [address] Upcoming Health Maintenance Date Due  
 PAP AKA CERVICAL CYTOLOGY 4/10/2017 INFLUENZA AGE 9 TO ADULT 8/1/2017 Allergies as of 7/6/2017  Review Complete On: 7/6/2017 By: Ramona Love MD  
 No Known Allergies Current Immunizations  Reviewed on 8/2/2016 No immunizations on file. Not reviewed this visit You Were Diagnosed With   
  
 Codes Comments Well woman exam with routine gynecological exam    -  Primary ICD-10-CM: C51.885 ICD-9-CM: V72.31 Irregular bleeding     ICD-10-CM: N92.6 ICD-9-CM: 626.4 Vitals BP Pulse Height(growth percentile) Weight(growth percentile) LMP BMI  
 (!) 145/96 74 5' 10\" (1.778 m) 258 lb (117 kg) 06/23/2017 (Exact Date) 37.02 kg/m2 OB Status Smoking Status Having regular periods Current Every Day Smoker BMI and BSA Data Body Mass Index Body Surface Area 37.02 kg/m 2 2.4 m 2 Preferred Pharmacy Pharmacy Name Phone [facility], [address] [place] [address] [phone] Your Updated Medication List  
  
   
This list is accurate as of: 7/6/17 10:22 AM.  Always use your most recent med list. amLODIPine 5 mg tablet Commonly known as:  Bartholomew Inks Take 1 Tab by mouth daily. BENADRYL 25 mg capsule Generic drug:  diphenhydrAMINE Take 25 mg by mouth two (2) times a day. buPROPion  mg SR tablet Commonly known as:  WELLBUTRIN SR  
TAKE 1 TABLET BY MOUTH TWICE DAILY FOR SMOKING CESSATION  
  
 ergocalciferol 50,000 unit capsule Commonly known as:  ERGOCALCIFEROL  
TAKE 1 CAPSULE BY MOUTH EVERY 7 DAYS  
  
 ibuprofen 200 mg tablet Commonly known as:  MOTRIN Take  by mouth every eight (8) hours as needed for Pain. traZODone 50 mg tablet Commonly known as:  DESYREL  
TAKE 1 TABLET BY MOUTH EVERY NIGHT  
  
 valsartan-hydroCHLOROthiazide 80-12.5 mg per tablet Commonly known as:  DIOVAN HCT Take 1 Tab by mouth daily. Follow-up Instructions Return in about 2 weeks (around 7/20/2017) for results. To-Do List   
 07/06/2017 Imaging:  US PELV NON OB W TV   
  
  
Patient Instructions Abnormal Uterine Bleeding: Care Instructions Your Care Instructions Abnormal uterine bleeding (AUB) is irregular bleeding from the uterus that is longer or heavier than usual or does not occur at your regular time. Sometimes it is caused by changes in hormone levels. It can also be caused by growths in the uterus, such as fibroids or polyps. Sometimes a cause cannot be found. You may have heavy bleeding when you are not expecting your period. Your doctor may suggest a pregnancy test, if you think you are pregnant. Follow-up care is a key part of your treatment and safety. Be sure to make and go to all appointments, and call your doctor if you are having problems. It's also a good idea to know your test results and keep a list of the medicines you take. How can you care for yourself at home? · Be safe with medicines. Take pain medicines exactly as directed. ¨ If the doctor gave you a prescription medicine for pain, take it as prescribed. ¨ If you are not taking a prescription pain medicine, ask your doctor if you can take an over-the-counter medicine. · You may be low in iron because of blood loss.  Eat a balanced diet that is high in iron and vitamin C. Foods rich in iron include red meat, shellfish, eggs, beans, and leafy green vegetables. Talk to your doctor about whether you need to take iron pills or a multivitamin. When should you call for help? Call 911 anytime you think you may need emergency care. For example, call if: 
· You passed out (lost consciousness). Call your doctor now or seek immediate medical care if: 
· You have sudden, severe pain in your belly or pelvis. · You have severe vaginal bleeding. You are soaking through your usual pads or tampons every hour for 2 or more hours. · You feel dizzy or lightheaded, or you feel like you may faint. Watch closely for changes in your health, and be sure to contact your doctor if: 
· You have new belly or pelvic pain. · You have a fever. · Your bleeding gets worse or lasts longer than 1 week. · You think you may be pregnant. Where can you learn more? Go to http://noelle-vaughn.info/. Enter L413 in the search box to learn more about \"Abnormal Uterine Bleeding: Care Instructions. \" Current as of: October 13, 2016 Content Version: 11.3 © 0095-6284 [employer]. Care instructions adapted under license by [employer] (which disclaims liability or warranty for this information). If you have questions about a medical condition or this instruction, always ask your healthcare professional. [address] any warranty or liability for your use of this information. Introducing [facility]! Dear Sheila Lopez: Thank you for requesting a Photofy account. Our records indicate that you already have an active Photofy account. You can access your account anytime at [email or web address] Did you know that you can access your hospital and ER discharge instructions at any time in Photofy? You can also review all of your test results from your hospital stay or ER visit. Additional Information If you have questions, please visit the Frequently Asked Questions section of the [employer] website at [email or web address] Remember, [employer] is NOT to be used for urgent needs. For medical emergencies, dial 911. Now available from your iPhone and Android! Please provide this summary of care documentation to your next provider. Your primary care clinician is listed as Gerri Clark. If you have any questions after today's visit, please call [phone].
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
## 2017-07-06 NOTE — PATIENT INSTRUCTIONS
Abnormal Uterine Bleeding: Care Instructions  Your Care Instructions  Abnormal uterine bleeding (AUB) is irregular bleeding from the uterus that is longer or heavier than usual or does not occur at your regular time. Sometimes it is caused by changes in hormone levels. It can also be caused by growths in the uterus, such as fibroids or polyps. Sometimes a cause cannot be found. You may have heavy bleeding when you are not expecting your period. Your doctor may suggest a pregnancy test, if you think you are pregnant. Follow-up care is a key part of your treatment and safety. Be sure to make and go to all appointments, and call your doctor if you are having problems. It's also a good idea to know your test results and keep a list of the medicines you take. How can you care for yourself at home? · Be safe with medicines. Take pain medicines exactly as directed. ¨ If the doctor gave you a prescription medicine for pain, take it as prescribed. ¨ If you are not taking a prescription pain medicine, ask your doctor if you can take an over-the-counter medicine. · You may be low in iron because of blood loss. Eat a balanced diet that is high in iron and vitamin C. Foods rich in iron include red meat, shellfish, eggs, beans, and leafy green vegetables. Talk to your doctor about whether you need to take iron pills or a multivitamin. When should you call for help? Call 911 anytime you think you may need emergency care. For example, call if:  · You passed out (lost consciousness). Call your doctor now or seek immediate medical care if:  · You have sudden, severe pain in your belly or pelvis. · You have severe vaginal bleeding. You are soaking through your usual pads or tampons every hour for 2 or more hours. · You feel dizzy or lightheaded, or you feel like you may faint. Watch closely for changes in your health, and be sure to contact your doctor if:  · You have new belly or pelvic pain.   · You have a fever.  · Your bleeding gets worse or lasts longer than 1 week. · You think you may be pregnant. Where can you learn more? Go to http://noelle-vaughn.info/. Enter V593 in the search box to learn more about \"Abnormal Uterine Bleeding: Care Instructions. \"  Current as of: October 13, 2016  Content Version: 11.3  © 1361-0876 [employer]. Care instructions adapted under license by [employer] ([employer]) (which disclaims liability or warranty for this information). If you have questions about a medical condition or this instruction, always ask your healthcare professional. [place] [address] any warranty or liability for your use of this information.

## 2017-07-11 RX ORDER — METRONIDAZOLE 500 MG/1
500 TABLET ORAL 2 TIMES DAILY
Qty: 10 TAB | Refills: 0 | Status: SHIPPED | OUTPATIENT
Start: 2017-07-11 | End: 2017-07-16

## 2017-10-17 DIAGNOSIS — F33.2 SEVERE EPISODE OF RECURRENT MAJOR DEPRESSIVE DISORDER, WITHOUT PSYCHOTIC FEATURES (HCC): ICD-10-CM

## 2017-10-17 DIAGNOSIS — F17.200 SMOKING: ICD-10-CM

## 2017-10-17 RX ORDER — BUPROPION HYDROCHLORIDE 150 MG/1
TABLET, EXTENDED RELEASE ORAL
Qty: 180 TAB | Refills: 0 | Status: SHIPPED | OUTPATIENT
Start: 2017-10-17 | End: 2018-04-12 | Stop reason: SDUPTHER

## 2017-10-24 ENCOUNTER — OFFICE VISIT (OUTPATIENT)
Dept: FAMILY MEDICINE CLINIC | Age: 42
End: 2017-10-24

## 2017-10-24 VITALS
OXYGEN SATURATION: 98 % | BODY MASS INDEX: 36.65 KG/M2 | RESPIRATION RATE: 22 BRPM | DIASTOLIC BLOOD PRESSURE: 86 MMHG | WEIGHT: 256 LBS | HEIGHT: 70 IN | SYSTOLIC BLOOD PRESSURE: 134 MMHG | HEART RATE: 81 BPM | TEMPERATURE: 97.9 F

## 2017-10-24 DIAGNOSIS — M79.671 RIGHT FOOT PAIN: ICD-10-CM

## 2017-10-24 DIAGNOSIS — I10 ESSENTIAL HYPERTENSION, BENIGN: Primary | ICD-10-CM

## 2017-10-24 DIAGNOSIS — F63.3 TRICHOTILLOMANIA: ICD-10-CM

## 2017-10-24 DIAGNOSIS — E55.9 VITAMIN D DEFICIENCY: ICD-10-CM

## 2017-10-24 DIAGNOSIS — Z72.0 TOBACCO ABUSE: ICD-10-CM

## 2017-10-24 DIAGNOSIS — F33.2 SEVERE EPISODE OF RECURRENT MAJOR DEPRESSIVE DISORDER, WITHOUT PSYCHOTIC FEATURES (HCC): ICD-10-CM

## 2017-10-24 NOTE — MR AVS SNAPSHOT
Visit Information Date & Time Provider Department Dept. Phone Encounter #  
 10/24/2017 11:15 AM Janae Ac MD [address] [id number] Follow-up Instructions Return in about 3 months (around 1/24/2018) for follow up. Upcoming Health Maintenance Date Due DTaP/Tdap/Td series (1 - Tdap) 8/29/1995 PAP AKA CERVICAL CYTOLOGY 7/6/2020 Allergies as of 10/24/2017  Review Complete On: 10/24/2017 By: Janae Ac MD  
 No Known Allergies Current Immunizations  Reviewed on 8/2/2016 No immunizations on file. Not reviewed this visit You Were Diagnosed With   
  
 Codes Comments Trichotillomania    -  Primary ICD-10-CM: I68.7 ICD-9-CM: 312.39 Right foot pain     ICD-10-CM: M79.671 ICD-9-CM: 729.5 Vitamin D deficiency     ICD-10-CM: E55.9 ICD-9-CM: 268.9 Essential hypertension, benign     ICD-10-CM: I10 
ICD-9-CM: 401.1 Vitals BP Pulse Temp Resp Height(growth percentile) Weight(growth percentile) 134/86 81 97.9 °F (36.6 °C) (Oral) 22 5' 10\" (1.778 m) 256 lb (116.1 kg) LMP SpO2 BMI OB Status Smoking Status 10/17/2017 98% 36.73 kg/m2 Having regular periods Current Every Day Smoker BMI and BSA Data Body Mass Index Body Surface Area  
 36.73 kg/m 2 2.39 m 2 Preferred Pharmacy Pharmacy Name Phone [facility] [address] [phone] Your Updated Medication List  
  
   
This list is accurate as of: 10/24/17 12:22 PM.  Always use your most recent med list. amLODIPine 5 mg tablet Commonly known as:  Voncile [place] Take 1 Tab by mouth daily. BENADRYL 25 mg capsule Generic drug:  diphenhydrAMINE Take 25 mg by mouth two (2) times a day. buPROPion  mg SR tablet Commonly known as:  WELLBUTRIN SR  
TAKE 1 TABLET BY MOUTH TWICE DAILY FOR SMOKING CESSATION  
  
 ergocalciferol 50,000 unit capsule Commonly known as:  ERGOCALCIFEROL  
TAKE 1 CAPSULE BY MOUTH EVERY 7 DAYS  
  
 ibuprofen 200 mg tablet Commonly known as:  MOTRIN Take  by mouth every eight (8) hours as needed for Pain. traZODone 50 mg tablet Commonly known as:  DESYREL  
TAKE 1 TABLET BY MOUTH EVERY NIGHT  
  
 valsartan-hydroCHLOROthiazide 80-12.5 mg per tablet Commonly known as:  DIOVAN HCT Take 1 Tab by mouth daily. We Performed the Following REFERRAL TO PODIATRY [REF90 Custom] REFERRAL TO PSYCHIATRY [REF91 Custom] Follow-up Instructions Return in about 3 months (around 1/24/2018) for follow up. Referral Information Referral ID Referred By Referred To  
  
 [id number] Lorenzo Gee Not Available Visits Status Start Date End Date 1 New Request 10/24/17 10/24/18 If your referral has a status of pending review or denied, additional information will be sent to support the outcome of this decision. Referral ID Referred By Referred To  
 [id number] Lorenzo Gee Not Available Visits Status Start Date End Date 1 New Request 10/24/17 10/24/18 If your referral has a status of pending review or denied, additional information will be sent to support the outcome of this decision. Patient Instructions Learning About Vitamin D Why is it important to get enough vitamin D? Your body needs vitamin D to absorb calcium. Calcium keeps your bones and muscles, including your heart, healthy and strong. If your muscles don't get enough calcium, they can cramp, hurt, or feel weak. You may have long-term (chronic) muscle aches and pains. If you don't get enough vitamin D throughout life, you have an increased chance of having thin and brittle bones (osteoporosis) in your later years. Children who don't get enough vitamin D may not grow as much as others their age.  They also have a chance of getting a rare disease called rickets. It causes weak bones. Vitamin D and calcium are added to many foods. And your body uses sunshine to make its own vitamin D. How much vitamin D do you need? The [place] [employer] recommends that people ages 3 through 79 get 600 IU (international units) every day. Adults 71 and older need 800 IU every day. Blood tests for vitamin D can check your vitamin D level. But there is no standard normal range used by all laboratories. The [place] [employer] recommends a blood level of 20 ng/mL of vitamin D for healthy bones. And most people in the [place] and [facility] ([facility]) meet this goal. 
How can you get more vitamin D? Foods that contain vitamin D include: 
· [place], tuna, and mackerel. These are some of the best foods to eat when you need to get more vitamin D. 
· Cheese, egg yolks, and beef liver. These foods have vitamin D in small amounts. · Milk, soy drinks, orange juice, yogurt, margarine, and some kinds of cereal have vitamin D added to them. Some people don't make vitamin D as well as others. They may have to take extra care in getting enough vitamin D. Things that reduce how much vitamin D your body makes include: · Dark skin, such as many  Americans have. · Age, especially if you are older than 72. · Digestive problems, such as Crohn's or celiac disease. · Liver and kidney disease. Some people who do not get enough vitamin D may need supplements. Are there any risks from taking vitamin D? 
· Too much vitamin D: 
¨ Can damage your kidneys. ¨ Can cause nausea and vomiting, constipation, and weakness. ¨ Raises the amount of calcium in your blood. If this happens, you can get confused or have an irregular heart rhythm. · Vitamin D may interact with other medicines. Tell your doctor about all of the medicines you take, including over-the-counter drugs, herbs, and pills. Tell your doctor about all of your current medical problems. Where can you learn more? Go to http://noelle-vaughn.info/. Enter [id number] in the search box to learn more about \"Learning About Vitamin D.\" 
Current as of: July 26, 2016 Content Version: 11.3 © 9094-3654 Healthwise, Incorporated. Care instructions adapted under license by [employer] (which disclaims liability or warranty for this information). If you have questions about a medical condition or this instruction, always ask your healthcare professional. [address]  any warranty or liability for your use of this information. Introducing \[facility]\"" & HEALTH SERVICES! Dear Bailey Abreu: Thank you for requesting a [employer] account. Our records indicate that you already have an active [employer] account. You can access your account anytime at [email or web address] Did you know that you can access your hospital and ER discharge instructions at any time in [employer]? You can also review all of your test results from your hospital stay or ER visit. Additional Information If you have questions, please visit the Frequently Asked Questions section of the [employer] website at [email or web address] Remember, [employer] is NOT to be used for urgent needs. For medical emergencies, dial 911. Now available from your iPhone and Android! Please provide this summary of care documentation to your next provider. Your primary care clinician is listed as Anuj Royal. If you have any questions after today's visit, please call [phone].

## 2017-10-24 NOTE — PATIENT INSTRUCTIONS
Learning About Vitamin D  Why is it important to get enough vitamin D? Your body needs vitamin D to absorb calcium. Calcium keeps your bones and muscles, including your heart, healthy and strong. If your muscles don't get enough calcium, they can cramp, hurt, or feel weak. You may have long-term (chronic) muscle aches and pains. If you don't get enough vitamin D throughout life, you have an increased chance of having thin and brittle bones (osteoporosis) in your later years. Children who don't get enough vitamin D may not grow as much as others their age. They also have a chance of getting a rare disease called rickets. It causes weak bones. Vitamin D and calcium are added to many foods. And your body uses sunshine to make its own vitamin D. How much vitamin D do you need? The [place] of [employer] recommends that people ages 3 through 79 get 600 IU (international units) every day. Adults 71 and older need 800 IU every day. Blood tests for vitamin D can check your vitamin D level. But there is no standard normal range used by all laboratories. The [place] [employer] recommends a blood level of 20 ng/mL of vitamin D for healthy bones. And most people in the [place] and [facility] ([facility]) meet this goal.  How can you get more vitamin D? Foods that contain vitamin D include:  · [place], tuna, and mackerel. These are some of the best foods to eat when you need to get more vitamin D.  · Cheese, egg yolks, and beef liver. These foods have vitamin D in small amounts. · Milk, soy drinks, orange juice, yogurt, margarine, and some kinds of cereal have vitamin D added to them. Some people don't make vitamin D as well as others. They may have to take extra care in getting enough vitamin D. Things that reduce how much vitamin D your body makes include:  · Dark skin, such as many  Americans have. · Age, especially if you are older than 72. · Digestive problems, such as Crohn's or celiac disease.   · Liver and kidney disease. Some people who do not get enough vitamin D may need supplements. Are there any risks from taking vitamin D?  · Too much vitamin D:  ¨ Can damage your kidneys. ¨ Can cause nausea and vomiting, constipation, and weakness. ¨ Raises the amount of calcium in your blood. If this happens, you can get confused or have an irregular heart rhythm. · Vitamin D may interact with other medicines. Tell your doctor about all of the medicines you take, including over-the-counter drugs, herbs, and pills. Tell your doctor about all of your current medical problems. Where can you learn more? Go to http://noelleRebellion Photonicsvaughn.info/. Enter [id number] in the search box to learn more about \"Learning About Vitamin D.\"  Current as of: July 26, 2016  Content Version: 11.3  © 4801-0493 Healthwise, Incorporated. Care instructions adapted under license by [employer] (which disclaims liability or warranty for this information). If you have questions about a medical condition or this instruction, always ask your healthcare professional. [address] any warranty or liability for your use of this information.
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
## 2017-10-24 NOTE — PROGRESS NOTES
Chief Complaint   Patient presents with    Follow Up Chronic Condition     Blood pressure     1. Have you been to the ER, urgent care clinic since your last visit? Hospitalized since your last visit? No    2. Have you seen or consulted any other health care providers outside of the [address] [place] [address] since your last visit? Include any pap smears or colon screening.      Foot Doctor in August

## 2017-10-29 PROBLEM — F63.3 TRICHOTILLOMANIA: Status: ACTIVE | Noted: 2017-10-29

## 2017-10-29 RX ORDER — ERGOCALCIFEROL 1.25 MG/1
CAPSULE ORAL
Qty: 12 CAP | Refills: 0 | Status: SHIPPED | OUTPATIENT
Start: 2017-10-29 | End: 2018-04-12

## 2017-11-24 ENCOUNTER — TELEPHONE (OUTPATIENT)
Dept: FAMILY MEDICINE CLINIC | Age: 42
End: 2017-11-24

## 2017-11-30 DIAGNOSIS — M79.671 RIGHT FOOT PAIN: Primary | ICD-10-CM

## 2017-11-30 NOTE — TELEPHONE ENCOUNTER
Please put in another podiatry referral. Patient does not want to see Dr. Sabrina Stone as provider.

## 2017-12-14 ENCOUNTER — DOCUMENTATION ONLY (OUTPATIENT)
Dept: FAMILY MEDICINE CLINIC | Age: 42
End: 2017-12-14

## 2017-12-14 NOTE — PROGRESS NOTES
Patient scheduled with Dr. Gricelda Walker with [facility]: APRIL [facility]LAN for 12/18/2017. Referral, authorization, and last office note faxed to office.     [facility]  Fax: [phone]  Phone: [phone]

## 2018-03-18 ENCOUNTER — HOSPITAL ENCOUNTER (EMERGENCY)
Age: 43
Discharge: HOME | End: 2018-03-18
Attending: EMERGENCY MEDICINE
Payer: MEDICAID

## 2018-03-18 VITALS
BODY MASS INDEX: 33.64 KG/M2 | OXYGEN SATURATION: 100 % | SYSTOLIC BLOOD PRESSURE: 134 MMHG | TEMPERATURE: 98.6 F | HEIGHT: 70 IN | DIASTOLIC BLOOD PRESSURE: 86 MMHG | RESPIRATION RATE: 18 BRPM | HEART RATE: 105 BPM | WEIGHT: 235 LBS

## 2018-03-18 DIAGNOSIS — H66.002 ACUTE SUPPURATIVE OTITIS MEDIA OF LEFT EAR WITHOUT SPONTANEOUS RUPTURE OF TYMPANIC MEMBRANE, RECURRENCE NOT SPECIFIED: Primary | ICD-10-CM

## 2018-03-18 PROCEDURE — 74011250637 HC RX REV CODE- 250/637: Performed by: EMERGENCY MEDICINE

## 2018-03-18 PROCEDURE — 99283 EMERGENCY DEPT VISIT LOW MDM: CPT

## 2018-03-18 RX ORDER — AMOXICILLIN AND CLAVULANATE POTASSIUM 875; 125 MG/1; MG/1
1 TABLET, FILM COATED ORAL
Status: COMPLETED | OUTPATIENT
Start: 2018-03-18 | End: 2018-03-18

## 2018-03-18 RX ORDER — AMOXICILLIN AND CLAVULANATE POTASSIUM 875; 125 MG/1; MG/1
1 TABLET, FILM COATED ORAL 2 TIMES DAILY
Qty: 20 TAB | Refills: 0 | Status: SHIPPED | OUTPATIENT
Start: 2018-03-18 | End: 2018-03-28

## 2018-03-18 RX ADMIN — AMOXICILLIN AND CLAVULANATE POTASSIUM 1 TABLET: 875; 125 TABLET, FILM COATED ORAL at 02:48
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
## 2018-03-18 NOTE — ED TRIAGE NOTES
\"heard a little pop in my left ear and then it sounded like I was underwater, seems like it is going into my right ear.   Pain in left ear and down left side of neck, headache behind left eye\"

## 2018-03-18 NOTE — DISCHARGE INSTRUCTIONS
Ear Infection (Otitis Media): Care Instructions  Your Care Instructions    An ear infection may start with a cold and affect the middle ear (otitis media). It can hurt a lot. Most ear infections clear up on their own in a couple of days. Most often you will not need antibiotics. This is because many ear infections are caused by a virus. Antibiotics don't work against a virus. Regular doses of pain medicines are the best way to reduce your fever and help you feel better. Follow-up care is a key part of your treatment and safety. Be sure to make and go to all appointments, and call your doctor if you are having problems. It's also a good idea to know your test results and keep a list of the medicines you take. How can you care for yourself at home? · Take pain medicines exactly as directed. ¨ If the doctor gave you a prescription medicine for pain, take it as prescribed. ¨ If you are not taking a prescription pain medicine, take an over-the-counter medicine, such as acetaminophen (Tylenol), ibuprofen (Advil, Motrin), or naproxen (Aleve). Read and follow all instructions on the label. ¨ Do not take two or more pain medicines at the same time unless the doctor told you to. Many pain medicines have acetaminophen, which is Tylenol. Too much acetaminophen (Tylenol) can be harmful. · Plan to take a full dose of pain reliever before bedtime. Getting enough sleep will help you get better. · Try a warm, moist washcloth on the ear. It may help relieve pain. · If your doctor prescribed antibiotics, take them as directed. Do not stop taking them just because you feel better. You need to take the full course of antibiotics. When should you call for help? Call your doctor now or seek immediate medical care if:  ? · You have new or increasing ear pain. ? · You have new or increasing pus or blood draining from your ear. ? · You have a fever with a stiff neck or a severe headache. ? Watch closely for changes in your health, and be sure to contact your doctor if:  ? · You have new or worse symptoms. ? · You are not getting better after taking an antibiotic for 2 days. Where can you learn more? Go to http://noelle-vaughn.info/. Enter M376 in the search box to learn more about \"Ear Infection (Otitis Media): Care Instructions. \"  Current as of: May 12, 2017  Content Version: 11.4  © 0256-1630 Healthwise, [employer]. Care instructions adapted under license by [employer] (which disclaims liability or warranty for this information). If you have questions about a medical condition or this instruction, always ask your healthcare professional. [place] [address] any warranty or liability for your use of this information.

## 2018-04-12 ENCOUNTER — OFFICE VISIT (OUTPATIENT)
Dept: FAMILY MEDICINE CLINIC | Age: 43
End: 2018-04-12

## 2018-04-12 ENCOUNTER — TELEPHONE (OUTPATIENT)
Dept: FAMILY MEDICINE CLINIC | Age: 43
End: 2018-04-12

## 2018-04-12 VITALS
HEIGHT: 70 IN | OXYGEN SATURATION: 100 % | SYSTOLIC BLOOD PRESSURE: 134 MMHG | HEART RATE: 96 BPM | DIASTOLIC BLOOD PRESSURE: 87 MMHG | WEIGHT: 252.8 LBS | RESPIRATION RATE: 17 BRPM | BODY MASS INDEX: 36.19 KG/M2 | TEMPERATURE: 98.3 F

## 2018-04-12 DIAGNOSIS — K21.9 GASTROESOPHAGEAL REFLUX DISEASE, ESOPHAGITIS PRESENCE NOT SPECIFIED: Primary | ICD-10-CM

## 2018-04-12 DIAGNOSIS — F17.210 CIGARETTE NICOTINE DEPENDENCE WITHOUT COMPLICATION: ICD-10-CM

## 2018-04-12 DIAGNOSIS — I10 ESSENTIAL HYPERTENSION: ICD-10-CM

## 2018-04-12 DIAGNOSIS — E55.9 VITAMIN D DEFICIENCY: ICD-10-CM

## 2018-04-12 DIAGNOSIS — F51.02 ADJUSTMENT INSOMNIA: ICD-10-CM

## 2018-04-12 RX ORDER — VALSARTAN AND HYDROCHLOROTHIAZIDE 80; 12.5 MG/1; MG/1
1 TABLET, FILM COATED ORAL DAILY
Qty: 90 TAB | Refills: 1 | Status: SHIPPED | OUTPATIENT
Start: 2018-04-12 | End: 2019-11-04 | Stop reason: SDUPTHER

## 2018-04-12 RX ORDER — RANITIDINE 150 MG/1
150 TABLET, FILM COATED ORAL 2 TIMES DAILY
Qty: 60 TAB | Refills: 3 | Status: SHIPPED | OUTPATIENT
Start: 2018-04-12 | End: 2018-05-25 | Stop reason: SDUPTHER

## 2018-04-12 RX ORDER — BUPROPION HYDROCHLORIDE 150 MG/1
150 TABLET, EXTENDED RELEASE ORAL 2 TIMES DAILY
Qty: 180 TAB | Refills: 1 | Status: SHIPPED | OUTPATIENT
Start: 2018-04-12 | End: 2019-11-04 | Stop reason: SDUPTHER

## 2018-04-12 RX ORDER — TRAZODONE HYDROCHLORIDE 50 MG/1
TABLET ORAL
Qty: 90 TAB | Refills: 3 | Status: SHIPPED | OUTPATIENT
Start: 2018-04-12 | End: 2019-11-04

## 2018-04-12 NOTE — PROGRESS NOTES
Chief Complaint   Patient presents with    Hypertension    Abdominal Pain     x 2 months    Medication Refill       1. Have you been to the ER, urgent care clinic since your last visit? Hospitalized since your last visit? Yes When: 3/12/2018 Where: [facility] Reason for visit: ear infection    2. Have you seen or consulted any other health care providers outside of the [facility] since your last visit? Include any pap smears or colon screening.  No

## 2018-04-12 NOTE — TELEPHONE ENCOUNTER
Patient states she was supposed to have medication sent to the pharmacy for acid reflux and she is currently at the pharmacy. Explained to the patient the provider will be notified and someone will call her afterwards. Patient verbalized understanding without any further questions or concerns.
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
## 2018-04-12 NOTE — TELEPHONE ENCOUNTER
Verified patient by full name and date of birth. Notified patient prescription has been sent to the pharmacy on file. Patient verbalized understanding without any further questions or concerns.

## 2018-04-12 NOTE — MR AVS SNAPSHOT
Peter Hughesa [address] [facility] [address] [facility] [address] [address] 
[phone] Patient: Jorge De Leon MRN: [id number] :1975 Visit Information Date & Time Provider Department Dept. Phone Encounter #  
 2018  9:15 AM Liana Dancer, [address] [place] [address] [phone] [id number] Follow-up Instructions Return in about 1 month (around 2018) for Abdominal pain/GERD. Your Appointments 5/3/2018  5:00 PM  
Follow Up with Elizabeth Nguyễn MD  
[address]  ([facility]) Appt Note: Abdominal pain [address] [address]  
[phone]  
  
   
 [facility] Jacky Renee Upcoming Health Maintenance Date Due DTaP/Tdap/Td series (1 - Tdap) 1996 MEDICARE YEARLY EXAM 3/14/2018 PAP AKA CERVICAL CYTOLOGY 2020 Allergies as of 2018  Review Complete On: 3/18/2018 By: Ori Wood RN Severity Noted Reaction Type Reactions Hydrocodone-acetaminophen  2013    Unknown (comments) Does not like the way it makes her feel Oxycodone-acetaminophen  2013    Unknown (comments) Does not like the way it makes her feel Current Immunizations  Reviewed on 2016 No immunizations on file. Not reviewed this visit You Were Diagnosed With   
  
 Codes Comments Gastroesophageal reflux disease, esophagitis presence not specified    -  Primary ICD-10-CM: K21.9 ICD-9-CM: 530.81 Vitamin D deficiency     ICD-10-CM: E55.9 ICD-9-CM: 268.9 Cigarette nicotine dependence without complication     KHF-41-LQ: F17.210 ICD-9-CM: 305.1 Essential hypertension     ICD-10-CM: I10 
ICD-9-CM: 401.9 Adjustment insomnia     ICD-10-CM: F51.02 
ICD-9-CM: 307.41 Vitals BP Pulse Temp Resp Height(growth percentile) Weight(growth percentile) 134/87 (BP 1 Location: Left arm, BP Patient Position: Sitting) 96 98.3 °F (36.8 °C) (Oral) 17 5' 10\" (1.778 m) 252 lb 12.8 oz (114.7 kg) LMP SpO2 BMI OB Status Smoking Status 04/05/2018 (Approximate) 100% 36.27 kg/m2 Having regular periods Current Every Day Smoker Vitals History BMI and BSA Data Body Mass Index Body Surface Area  
 36.27 kg/m 2 2.38 m 2 Preferred Pharmacy Pharmacy Name Phone [place], [address] [phone] Your Updated Medication List  
  
   
This list is accurate as of 4/12/18 10:01 AM.  Always use your most recent med list.  
  
  
  
  
 BENADRYL 25 mg capsule Generic drug:  diphenhydrAMINE Take 25 mg by mouth as needed. buPROPion  mg SR tablet Commonly known as:  Jose Fee Take 1 Tab by mouth two (2) times a day. Indications: Smoking Cessation  
  
 ibuprofen 200 mg tablet Commonly known as:  MOTRIN Take  by mouth every eight (8) hours as needed for Pain. traZODone 50 mg tablet Commonly known as:  DESYREL  
TAKE 1 TABLET BY MOUTH EVERY NIGHT  
  
 valsartan-hydroCHLOROthiazide 80-12.5 mg per tablet Commonly known as:  DIOVAN HCT Take 1 Tab by mouth daily. Prescriptions Sent to Pharmacy Refills buPROPion SR (WELLBUTRIN SR) 150 mg SR tablet 1 Sig: Take 1 Tab by mouth two (2) times a day. Indications: Smoking Cessation Class: Normal  
 Pharmacy: [facility] [address] [place] [address], [address] Ph #: [phone] Route: Oral  
 traZODone (DESYREL) 50 mg tablet 3 Sig: TAKE 1 TABLET BY MOUTH EVERY NIGHT Class: Normal  
 Pharmacy: [facility] [address] [place] [address], [address] [facility] AT [address] [place] [address] Ph #: [phone]  
 valsartan-hydroCHLOROthiazide (DIOVAN HCT) 80-12.5 mg per tablet 1 Sig: Take 1 Tab by mouth daily. Class: Normal  
 Pharmacy: [facility] [address] [place] [address], [address] #: [phone] Route: Oral  
  
Follow-up Instructions Return in about 1 month (around 5/12/2018) for Abdominal pain/GERD. To-Do List   
 04/12/2018 Lab:  VITAMIN D, 25 HYDROXY Patient Instructions Gastroesophageal Reflux Disease (GERD): Care Instructions Your Care Instructions Gastroesophageal reflux disease (GERD) is the backward flow of stomach acid into the esophagus. The esophagus is the tube that leads from your throat to your stomach. A one-way valve prevents the stomach acid from moving up into this tube. When you have GERD, this valve does not close tightly enough. If you have mild GERD symptoms including heartburn, you may be able to control the problem with antacids or over-the-counter medicine. Changing your diet, losing weight, and making other lifestyle changes can also help reduce symptoms. Follow-up care is a key part of your treatment and safety. Be sure to make and go to all appointments, and call your doctor if you are having problems. It's also a good idea to know your test results and keep a list of the medicines you take. How can you care for yourself at home? · Take your medicines exactly as prescribed. Call your doctor if you think you are having a problem with your medicine. · Your doctor may recommend over-the-counter medicine. For mild or occasional indigestion, antacids, such as Tums, Gaviscon, Mylanta, or Maalox, may help. Your doctor also may recommend over-the-counter acid reducers, such as Pepcid AC, Tagamet HB, Zantac 75, or Prilosec. Read and follow all instructions on the label. If you use these medicines often, talk with your doctor. · Change your eating habits. ¨ It's best to eat several small meals instead of two or three large meals. ¨ After you eat, wait 2 to 3 hours before you lie down. ¨ Chocolate, mint, and alcohol can make GERD worse. ¨ Spicy foods, foods that have a lot of acid (like tomatoes and oranges), and coffee can make GERD symptoms worse in some people. If your symptoms are worse after you eat a certain food, you may want to stop eating that food to see if your symptoms get better. · Do not smoke or chew tobacco. Smoking can make GERD worse. If you need help quitting, talk to your doctor about stop-smoking programs and medicines. These can increase your chances of quitting for good. · If you have GERD symptoms at night, raise the head of your bed 6 to 8 inches by putting the frame on blocks or placing a foam wedge under the head of your mattress. (Adding extra pillows does not work.) · Do not wear tight clothing around your middle. · Lose weight if you need to. Losing just 5 to 10 pounds can help. When should you call for help? Call your doctor now or seek immediate medical care if: 
? · You have new or different belly pain. ? · Your stools are black and tarlike or have streaks of blood. ? Watch closely for changes in your health, and be sure to contact your doctor if: 
? · Your symptoms have not improved after 2 days. ? · Food seems to catch in your throat or chest.  
Where can you learn more? Go to http://noelle-vaughn.info/. Enter O004 in the search box to learn more about \"Gastroesophageal Reflux Disease (GERD): Care Instructions. \" Current as of: May 12, 2017 Content Version: 11.4 © 8496-1559 [employer]. Care instructions adapted under license by [employer] (which disclaims liability or warranty for this information). If you have questions about a medical condition or this instruction, always ask your healthcare professional. [address] any warranty or liability for your use of this information. Learning About Vitamin D Why is it important to get enough vitamin D? Your body needs vitamin D to absorb calcium. Calcium keeps your bones and muscles, including your heart, healthy and strong. If your muscles don't get enough calcium, they can cramp, hurt, or feel weak. You may have long-term (chronic) muscle aches and pains. If you don't get enough vitamin D throughout life, you have an increased chance of having thin and brittle bones (osteoporosis) in your later years. Children who don't get enough vitamin D may not grow as much as others their age. They also have a chance of getting a rare disease called rickets. It causes weak bones. Vitamin D and calcium are added to many foods. And your body uses sunshine to make its own vitamin D. How much vitamin D do you need? The [employer] of [employer] recommends that people ages 3 through 79 get 600 IU (international units) every day. Adults 71 and older need 800 IU every day. Blood tests for vitamin D can check your vitamin D level. But there is no standard normal range used by all laboratories. The [employer] of [employer] recommends a blood level of 20 ng/mL of vitamin D for healthy bones. And most people in the [place] and [facility]) meet this goal. 
How can you get more vitamin D? Foods that contain vitamin D include: 
· [place], tuna, and mackerel. These are some of the best foods to eat when you need to get more vitamin D. 
· Cheese, egg yolks, and beef liver. These foods have vitamin D in small amounts. · Milk, soy drinks, orange juice, yogurt, margarine, and some kinds of cereal have vitamin D added to them. Some people don't make vitamin D as well as others. They may have to take extra care in getting enough vitamin D. Things that reduce how much vitamin D your body makes include: · Dark skin, such as many  Americans have. · Age, especially if you are older than 72. · Digestive problems, such as Crohn's or celiac disease. · Liver and kidney disease. Some people who do not get enough vitamin D may need supplements. Are there any risks from taking vitamin D? 
· Too much vitamin D: 
¨ Can damage your kidneys. ¨ Can cause nausea and vomiting, constipation, and weakness. ¨ Raises the amount of calcium in your blood. If this happens, you can get confused or have an irregular heart rhythm. · Vitamin D may interact with other medicines. Tell your doctor about all of the medicines you take, including over-the-counter drugs, herbs, and pills. Tell your doctor about all of your current medical problems. Where can you learn more? Go to http://noelleSiBEAMvaughn.info/. Enter [id number] in the search box to learn more about \"Learning About Vitamin D.\" 
Current as of: May 12, 2017 Content Version: 11.4 © 3092-8914 [employer]. Care instructions adapted under license by [employer] (which disclaims liability or warranty for this information). If you have questions about a medical condition or this instruction, always ask your healthcare professional. [address]  any warranty or liability for your use of this information. Introducing [facility]! Dear Analilia Berger: Thank you for requesting a Povio account. Our records indicate that you already have an active Povio account. You can access your account anytime at [email or web address] Did you know that you can access your hospital and ER discharge instructions at any time in Povio? You can also review all of your test results from your hospital stay or ER visit. Additional Information If you have questions, please visit the Frequently Asked Questions section of the Povio website at [email or web address] Remember, Povio is NOT to be used for urgent needs. For medical emergencies, dial 911. Now available from your iPhone and Android! Please provide this summary of care documentation to your next provider. Your primary care clinician is listed as Mirna Banks. If you have any questions after today's visit, please call [phone].

## 2018-04-12 NOTE — PATIENT INSTRUCTIONS
Gastroesophageal Reflux Disease (GERD): Care Instructions  Your Care Instructions    Gastroesophageal reflux disease (GERD) is the backward flow of stomach acid into the esophagus. The esophagus is the tube that leads from your throat to your stomach. A one-way valve prevents the stomach acid from moving up into this tube. When you have GERD, this valve does not close tightly enough. If you have mild GERD symptoms including heartburn, you may be able to control the problem with antacids or over-the-counter medicine. Changing your diet, losing weight, and making other lifestyle changes can also help reduce symptoms. Follow-up care is a key part of your treatment and safety. Be sure to make and go to all appointments, and call your doctor if you are having problems. It's also a good idea to know your test results and keep a list of the medicines you take. How can you care for yourself at home? · Take your medicines exactly as prescribed. Call your doctor if you think you are having a problem with your medicine. · Your doctor may recommend over-the-counter medicine. For mild or occasional indigestion, antacids, such as Tums, Gaviscon, Mylanta, or Maalox, may help. Your doctor also may recommend over-the-counter acid reducers, such as Pepcid AC, Tagamet HB, Zantac 75, or Prilosec. Read and follow all instructions on the label. If you use these medicines often, talk with your doctor. · Change your eating habits. ¨ It's best to eat several small meals instead of two or three large meals. ¨ After you eat, wait 2 to 3 hours before you lie down. ¨ Chocolate, mint, and alcohol can make GERD worse. ¨ Spicy foods, foods that have a lot of acid (like tomatoes and oranges), and coffee can make GERD symptoms worse in some people. If your symptoms are worse after you eat a certain food, you may want to stop eating that food to see if your symptoms get better.   · Do not smoke or chew tobacco. Smoking can make GERD worse. If you need help quitting, talk to your doctor about stop-smoking programs and medicines. These can increase your chances of quitting for good. · If you have GERD symptoms at night, raise the head of your bed 6 to 8 inches by putting the frame on blocks or placing a foam wedge under the head of your mattress. (Adding extra pillows does not work.)  · Do not wear tight clothing around your middle. · Lose weight if you need to. Losing just 5 to 10 pounds can help. When should you call for help? Call your doctor now or seek immediate medical care if:  ? · You have new or different belly pain. ? · Your stools are black and tarlike or have streaks of blood. ? Watch closely for changes in your health, and be sure to contact your doctor if:  ? · Your symptoms have not improved after 2 days. ? · Food seems to catch in your throat or chest.   Where can you learn more? Go to http://noelle-vaughn.info/. Enter D071 in the search box to learn more about \"Gastroesophageal Reflux Disease (GERD): Care Instructions. \"  Current as of: May 12, 2017  Content Version: 11.4  © 1537-8448 [employer]. Care instructions adapted under license by [employer] (which disclaims liability or warranty for this information). If you have questions about a medical condition or this instruction, always ask your healthcare professional. [address] any warranty or liability for your use of this information. Learning About Vitamin D  Why is it important to get enough vitamin D? Your body needs vitamin D to absorb calcium. Calcium keeps your bones and muscles, including your heart, healthy and strong. If your muscles don't get enough calcium, they can cramp, hurt, or feel weak. You may have long-term (chronic) muscle aches and pains.   If you don't get enough vitamin D throughout life, you have an increased chance of having thin and brittle bones (osteoporosis) in your later years. Children who don't get enough vitamin D may not grow as much as others their age. They also have a chance of getting a rare disease called rickets. It causes weak bones. Vitamin D and calcium are added to many foods. And your body uses sunshine to make its own vitamin D. How much vitamin D do you need? The [employer] of [employer] recommends that people ages 3 through 79 get 600 IU (international units) every day. Adults 71 and older need 800 IU every day. Blood tests for vitamin D can check your vitamin D level. But there is no standard normal range used by all laboratories. The [employer] of [employer] recommends a blood level of 20 ng/mL of vitamin D for healthy bones. And most people in the [place] and [facility] ([facility]) meet this goal.  How can you get more vitamin D? Foods that contain vitamin D include:  · Chandler, tuna, and mackerel. These are some of the best foods to eat when you need to get more vitamin D.  · Cheese, egg yolks, and beef liver. These foods have vitamin D in small amounts. · Milk, soy drinks, orange juice, yogurt, margarine, and some kinds of cereal have vitamin D added to them. Some people don't make vitamin D as well as others. They may have to take extra care in getting enough vitamin D. Things that reduce how much vitamin D your body makes include:  · Dark skin, such as many  Americans have. · Age, especially if you are older than 72. · Digestive problems, such as Crohn's or celiac disease. · Liver and kidney disease. Some people who do not get enough vitamin D may need supplements. Are there any risks from taking vitamin D?  · Too much vitamin D:  ¨ Can damage your kidneys. ¨ Can cause nausea and vomiting, constipation, and weakness. ¨ Raises the amount of calcium in your blood. If this happens, you can get confused or have an irregular heart rhythm. · Vitamin D may interact with other medicines.  Tell your doctor about all of the medicines you take, including over-the-counter drugs, herbs, and pills. Tell your doctor about all of your current medical problems. Where can you learn more? Go to http://noelle-vaughn.info/. Enter [id number] in the search box to learn more about \"Learning About Vitamin D.\"  Current as of: May 12, 2017  Content Version: 11.4  © 8430-0246 [employer]. Care instructions adapted under license by [employer] (which disclaims liability or warranty for this information). If you have questions about a medical condition or this instruction, always ask your healthcare professional. [place] [address] any warranty or liability for your use of this information.

## 2018-05-03 ENCOUNTER — OFFICE VISIT (OUTPATIENT)
Dept: OBGYN CLINIC | Age: 43
End: 2018-05-03

## 2018-05-03 VITALS
BODY MASS INDEX: 35.65 KG/M2 | DIASTOLIC BLOOD PRESSURE: 88 MMHG | WEIGHT: 249 LBS | HEIGHT: 70 IN | SYSTOLIC BLOOD PRESSURE: 154 MMHG | HEART RATE: 100 BPM

## 2018-05-03 DIAGNOSIS — B96.89 BV (BACTERIAL VAGINOSIS): Primary | ICD-10-CM

## 2018-05-03 DIAGNOSIS — N76.0 BV (BACTERIAL VAGINOSIS): Primary | ICD-10-CM

## 2018-05-03 PROBLEM — E66.01 SEVERE OBESITY (BMI 35.0-39.9) WITH COMORBIDITY (HCC): Status: ACTIVE | Noted: 2018-05-03

## 2018-05-03 RX ORDER — METRONIDAZOLE 500 MG/1
500 TABLET ORAL 2 TIMES DAILY
Qty: 10 TAB | Refills: 0 | Status: SHIPPED | OUTPATIENT
Start: 2018-05-03 | End: 2018-05-08

## 2018-05-03 NOTE — MR AVS SNAPSHOT
Peter Moscoso [place] [address] [facility] [address] [id number] 
[phone] Patient: Jorge De Leon MRN: [id number] :1975 Visit Information Date & Time Provider Department Dept. Phone Encounter #  
 5/3/2018  5:00 PM Elizabeth Nguyễn [facility] [place] [phone] [id number] Follow-up Instructions Return if symptoms worsen or fail to improve. Your Appointments 5/10/2018  8:30 AM  
Follow Up with Liana Dancer, MD  
[place] [id number] ([address]) Appt Note: 1 mo f/u  
 [facility] [address]  
  
   
 [address] Upcoming Health Maintenance Date Due Influenza Age 5 to Adult 2018 PAP AKA CERVICAL CYTOLOGY 2020 Allergies as of 5/3/2018  Review Complete On: 5/3/2018 By: Elizabeth Nguyễn MD  
  
 Severity Noted Reaction Type Reactions Hydrocodone-acetaminophen  2013    Unknown (comments) Does not like the way it makes her feel Oxycodone-acetaminophen  2013    Unknown (comments) Does not like the way it makes her feel Current Immunizations  Reviewed on 2016 No immunizations on file. Not reviewed this visit You Were Diagnosed With   
  
 Codes Comments BV (bacterial vaginosis)    -  Primary ICD-10-CM: N76.0, B96.89 
ICD-9-CM: 616.10, 041.9 Vitals BP Pulse Height(growth percentile) Weight(growth percentile) LMP BMI  
 154/88 100 5' 10\" (1.778 m) 249 lb (112.9 kg) 2018 35.73 kg/m2 OB Status Smoking Status Having regular periods Current Every Day Smoker BMI and BSA Data Body Mass Index Body Surface Area 35.73 kg/m 2 2.36 m 2 Preferred Pharmacy Pharmacy Name Phone [place], [address] [facility] [address] [facility] [phone] Your Updated Medication List  
  
   
This list is accurate as of 5/3/18  5:36 PM.  Always use your most recent med list.  
  
  
  
  
 BENADRYL 25 mg capsule Generic drug:  diphenhydrAMINE Take 25 mg by mouth as needed. buPROPion  mg SR tablet Commonly known as:  Darian Balk Take 1 Tab by mouth two (2) times a day. Indications: Smoking Cessation  
  
 metroNIDAZOLE 500 mg tablet Commonly known as:  FLAGYL Take 1 Tab by mouth two (2) times a day for 5 days. Indications: Bacterial Vaginosis  
  
 raNITIdine 150 mg tablet Commonly known as:  ZANTAC Take 1 Tab by mouth two (2) times a day. traZODone 50 mg tablet Commonly known as:  DESYREL  
TAKE 1 TABLET BY MOUTH EVERY NIGHT  
  
 valsartan-hydroCHLOROthiazide 80-12.5 mg per tablet Commonly known as:  DIOVAN HCT Take 1 Tab by mouth daily. Prescriptions Sent to Pharmacy Refills  
 metroNIDAZOLE (FLAGYL) 500 mg tablet 0 Sig: Take 1 Tab by mouth two (2) times a day for 5 days. Indications: Bacterial Vaginosis Class: Normal  
 Pharmacy: [facility] [address] [place] [address] #: [phone] Route: Oral  
  
Follow-up Instructions Return if symptoms worsen or fail to improve. Patient Instructions Bacterial Vaginosis: Care Instructions Your Care Instructions Bacterial vaginosis is a type of vaginal infection. It is caused by excess growth of certain bacteria that are normally found in the vagina. Symptoms can include itching, swelling, pain when you urinate or have sex, and a gray or yellow discharge with a \"fishy\" odor. It is not considered an infection that is spread through sexual contact. Although symptoms can be annoying and uncomfortable, bacterial vaginosis does not usually cause other health problems. However, if you have it while you are pregnant, it can cause complications. While the infection may go away on its own, most doctors use antibiotics to treat it. You may have been prescribed pills or vaginal cream. With treatment, bacterial vaginosis usually clears up in 5 to 7 days. Follow-up care is a key part of your treatment and safety. Be sure to make and go to all appointments, and call your doctor if you are having problems. It's also a good idea to know your test results and keep a list of the medicines you take. How can you care for yourself at home? · Take your antibiotics as directed. Do not stop taking them just because you feel better. You need to take the full course of antibiotics. · Do not eat or drink anything that contains alcohol if you are taking metronidazole (Flagyl). · Keep using your medicine if you start your period. Use pads instead of tampons while using a vaginal cream or suppository. Tampons can absorb the medicine. · Wear loose cotton clothing. Do not wear nylon and other materials that hold body heat and moisture close to the skin. · Do not scratch. Relieve itching with a cold pack or a cool bath. · Do not wash your vaginal area more than once a day. Use plain water or a mild, unscented soap. Do not douche. When should you call for help? Watch closely for changes in your health, and be sure to contact your doctor if: 
? · You have unexpected vaginal bleeding. ? · You have a fever. ? · You have new or increased pain in your vagina or pelvis. ? · You are not getting better after 1 week. ? · Your symptoms return after you finish the course of your medicine. Where can you learn more? Go to http://noelle-vaughn.info/. Sureshe Every in the search box to learn more about \"Bacterial Vaginosis: Care Instructions. \" Current as of: October 13, 2016 Content Version: 11.4 © 4140-1527 Healthwise, [employer].  Care instructions adapted under license by [employer]Â® (which disclaims liability or warranty for this information). If you have questions about a medical condition or this instruction, always ask your healthcare professional. [address] any warranty or liability for your use of this information. Introducing [facility] SERVICES! Dear Rm Bhardwaj: Thank you for requesting a Xention account. Our records indicate that you already have an active Xention account. You can access your account anytime at [email or web address] Did you know that you can access your hospital and ER discharge instructions at any time in Xention? You can also review all of your test results from your hospital stay or ER visit. Additional Information If you have questions, please visit the Frequently Asked Questions section of the Xention website at [email or web address] Remember, Xention is NOT to be used for urgent needs. For medical emergencies, dial 911. Now available from your iPhone and Android! Please provide this summary of care documentation to your next provider. Your primary care clinician is listed as Jaspal Oconnell. If you have any questions after today's visit, please call [phone].

## 2018-05-03 NOTE — PATIENT INSTRUCTIONS
Bacterial Vaginosis: Care Instructions  Your Care Instructions    Bacterial vaginosis is a type of vaginal infection. It is caused by excess growth of certain bacteria that are normally found in the vagina. Symptoms can include itching, swelling, pain when you urinate or have sex, and a gray or yellow discharge with a \"fishy\" odor. It is not considered an infection that is spread through sexual contact. Although symptoms can be annoying and uncomfortable, bacterial vaginosis does not usually cause other health problems. However, if you have it while you are pregnant, it can cause complications. While the infection may go away on its own, most doctors use antibiotics to treat it. You may have been prescribed pills or vaginal cream. With treatment, bacterial vaginosis usually clears up in 5 to 7 days. Follow-up care is a key part of your treatment and safety. Be sure to make and go to all appointments, and call your doctor if you are having problems. It's also a good idea to know your test results and keep a list of the medicines you take. How can you care for yourself at home? · Take your antibiotics as directed. Do not stop taking them just because you feel better. You need to take the full course of antibiotics. · Do not eat or drink anything that contains alcohol if you are taking metronidazole (Flagyl). · Keep using your medicine if you start your period. Use pads instead of tampons while using a vaginal cream or suppository. Tampons can absorb the medicine. · Wear loose cotton clothing. Do not wear nylon and other materials that hold body heat and moisture close to the skin. · Do not scratch. Relieve itching with a cold pack or a cool bath. · Do not wash your vaginal area more than once a day. Use plain water or a mild, unscented soap. Do not douche. When should you call for help?   Watch closely for changes in your health, and be sure to contact your doctor if:  ? · You have unexpected vaginal bleeding. ? · You have a fever. ? · You have new or increased pain in your vagina or pelvis. ? · You are not getting better after 1 week. ? · Your symptoms return after you finish the course of your medicine. Where can you learn more? Go to http://noelle-vaughn.info/. Bc Cunningham in the search box to learn more about \"Bacterial Vaginosis: Care Instructions. \"  Current as of: October 13, 2016  Content Version: 11.4  © 1025-1800 [employer]. Care instructions adapted under license by [employer] (which disclaims liability or warranty for this information). If you have questions about a medical condition or this instruction, always ask your healthcare professional. [address] any warranty or liability for your use of this information.

## 2018-05-03 NOTE — PROGRESS NOTES
Subjective:   36 y.o. female complains of possible vaginal discharge for 3-4 months intermittently. She had her last pap smear late 2017 and was diagnosed to BV. Pt. Concerned for recurrent BV. Continues to remove all body hair including on her scalp for 30 years. Denies abnormal vaginal bleeding or significant pelvic pain or  fever. No UTI symptoms. Denies history of known exposure to STD. She is rarely sexually active. Patient's last menstrual period was 04/26/2018. Objective:   She appears well, afebrile. Abdomen: benign, soft, nontender, no masses. Pelvic Exam: VULVA: normal appearing vulva with no masses, tenderness or lesions, VAGINA: normal appearing vagina with normal color and discharge, no lesions, CERVIX: normal appearing cervix without discharge or lesions, UTERUS: enlarged to 14 week's size, ADNEXA: tenderness right. Microscopic wet-mount exam shows clue cells, excessive bacteria. .    Assessment/Plan:   bacterial vaginosis  GC and chlamydia DNA  probe NOT sent to lab. Treatment: Flagyl 500 BID x 7 days and abstain from coitus during course of treatment  ROV prn if symptoms persist or worsen. Rx sent to pharmacy.

## 2018-05-08 DIAGNOSIS — E55.9 VITAMIN D DEFICIENCY: Primary | ICD-10-CM

## 2018-05-08 RX ORDER — MELATONIN
1000 DAILY
Qty: 90 TAB | Refills: 0 | Status: SHIPPED | OUTPATIENT
Start: 2018-05-08 | End: 2018-08-06

## 2018-05-09 ENCOUNTER — TELEPHONE (OUTPATIENT)
Dept: FAMILY MEDICINE CLINIC | Age: 43
End: 2018-05-09

## 2018-05-09 NOTE — TELEPHONE ENCOUNTER
----- Message from Iraj Mcclendon MD sent at 5/8/2018  3:28 PM EDT -----  Regarding: Vitamin D  Please let the patient know that her vitamin D was low. She needs to be repleted for 3 months. Prescription was sent to her pharmacy. Vitamin D level needs to be rechecked in 3 months to verify normal level.     Dr. Irene Gurrola

## 2018-06-01 DIAGNOSIS — E55.9 VITAMIN D DEFICIENCY: ICD-10-CM

## 2019-11-04 ENCOUNTER — OFFICE VISIT (OUTPATIENT)
Dept: FAMILY MEDICINE CLINIC | Age: 44
End: 2019-11-04

## 2019-11-04 VITALS
BODY MASS INDEX: 35.5 KG/M2 | WEIGHT: 248 LBS | RESPIRATION RATE: 16 BRPM | DIASTOLIC BLOOD PRESSURE: 100 MMHG | TEMPERATURE: 96.6 F | SYSTOLIC BLOOD PRESSURE: 149 MMHG | HEART RATE: 85 BPM | HEIGHT: 70 IN | OXYGEN SATURATION: 100 %

## 2019-11-04 DIAGNOSIS — L84 CALLUS OF FOOT: ICD-10-CM

## 2019-11-04 DIAGNOSIS — E66.01 SEVERE OBESITY (BMI 35.0-39.9) WITH COMORBIDITY (HCC): ICD-10-CM

## 2019-11-04 DIAGNOSIS — I10 ESSENTIAL HYPERTENSION: ICD-10-CM

## 2019-11-04 DIAGNOSIS — F33.2 SEVERE EPISODE OF RECURRENT MAJOR DEPRESSIVE DISORDER, WITHOUT PSYCHOTIC FEATURES (HCC): ICD-10-CM

## 2019-11-04 DIAGNOSIS — Z00.00 MEDICARE ANNUAL WELLNESS VISIT, SUBSEQUENT: Primary | ICD-10-CM

## 2019-11-04 DIAGNOSIS — K21.9 GASTROESOPHAGEAL REFLUX DISEASE, ESOPHAGITIS PRESENCE NOT SPECIFIED: ICD-10-CM

## 2019-11-04 DIAGNOSIS — F17.210 CIGARETTE NICOTINE DEPENDENCE WITHOUT COMPLICATION: ICD-10-CM

## 2019-11-04 DIAGNOSIS — H04.202: ICD-10-CM

## 2019-11-04 RX ORDER — VALSARTAN AND HYDROCHLOROTHIAZIDE 80; 12.5 MG/1; MG/1
1 TABLET, FILM COATED ORAL DAILY
Qty: 90 TAB | Refills: 1 | Status: SHIPPED | OUTPATIENT
Start: 2019-11-04 | End: 2019-11-06 | Stop reason: SDUPTHER

## 2019-11-04 RX ORDER — ESOMEPRAZOLE MAGNESIUM 40 MG/1
40 CAPSULE, DELAYED RELEASE ORAL DAILY
Qty: 30 CAP | Refills: 3 | Status: SHIPPED | OUTPATIENT
Start: 2019-11-04 | End: 2019-11-05 | Stop reason: SDUPTHER

## 2019-11-04 RX ORDER — BUPROPION HYDROCHLORIDE 150 MG/1
150 TABLET, EXTENDED RELEASE ORAL 2 TIMES DAILY
Qty: 180 TAB | Refills: 1 | Status: SHIPPED | OUTPATIENT
Start: 2019-11-04 | End: 2019-11-06 | Stop reason: SDUPTHER
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
## 2019-11-04 NOTE — PROGRESS NOTES
Chief Complaint   Patient presents with    Follow Up Chronic Condition     BP, Sleep Disturbances    Other     Referral renewal-Podiatry, Opthalmalogy     1. Have you been to the ER, urgent care clinic since your last visit? Hospitalized since your last visit? No    2. Have you seen or consulted any other health care providers outside of the [address] [place] [address] since your last visit? Include any pap smears or colon screening. No     Patient refused influenza vaccine.

## 2019-11-04 NOTE — PATIENT INSTRUCTIONS

## 2019-11-05 LAB
A-G RATIO,AGRAT: 1.5 RATIO (ref 1.1–2.6)
ABSOLUTE LYMPHOCYTE COUNT, 10803: 3.5 K/UL (ref 1–4.8)
ALBUMIN SERPL-MCNC: 4.4 G/DL (ref 3.5–5)
ALP SERPL-CCNC: 69 U/L (ref 25–115)
ALT SERPL-CCNC: 18 U/L (ref 5–40)
ANION GAP SERPL CALC-SCNC: 22 MMOL/L
AST SERPL W P-5'-P-CCNC: 28 U/L (ref 10–37)
BASOPHILS # BLD: 0 K/UL (ref 0–0.2)
BASOPHILS NFR BLD: 0 % (ref 0–2)
BILIRUB SERPL-MCNC: 0.2 MG/DL (ref 0.2–1.2)
BUN SERPL-MCNC: 9 MG/DL (ref 6–22)
CALCIUM SERPL-MCNC: 9.6 MG/DL (ref 8.4–10.5)
CHLORIDE SERPL-SCNC: 101 MMOL/L (ref 98–110)
CHOLEST SERPL-MCNC: 212 MG/DL (ref 110–200)
CO2 SERPL-SCNC: 24 MMOL/L (ref 20–32)
CREAT SERPL-MCNC: 0.9 MG/DL (ref 0.5–1.2)
EOSINOPHIL # BLD: 0.1 K/UL (ref 0–0.5)
EOSINOPHIL NFR BLD: 1 % (ref 0–6)
ERYTHROCYTE [DISTWIDTH] IN BLOOD BY AUTOMATED COUNT: 13.1 % (ref 10–15.5)
GFRAA, 66117: >60
GFRNA, 66118: >60
GLOBULIN,GLOB: 2.9 G/DL (ref 2–4)
GLUCOSE SERPL-MCNC: 97 MG/DL (ref 70–99)
GRANULOCYTES,GRANS: 35 % (ref 40–75)
HCT VFR BLD AUTO: 41.8 % (ref 35.1–48)
HDLC SERPL-MCNC: 4.5 MG/DL (ref 0–5)
HDLC SERPL-MCNC: 47 MG/DL (ref 40–59)
HGB BLD-MCNC: 12.9 G/DL (ref 11.7–16)
LDLC SERPL CALC-MCNC: 131 MG/DL (ref 50–99)
LYMPHOCYTES, LYMLT: 52 % (ref 20–45)
MCH RBC QN AUTO: 27 PG (ref 26–34)
MCHC RBC AUTO-ENTMCNC: 31 G/DL (ref 31–36)
MCV RBC AUTO: 88 FL (ref 80–95)
MONOCYTES # BLD: 0.8 K/UL (ref 0.1–1)
MONOCYTES NFR BLD: 12 % (ref 3–12)
NEUTROPHILS # BLD AUTO: 2.3 K/UL (ref 1.8–7.7)
PLATELET # BLD AUTO: 371 K/UL (ref 140–440)
PMV BLD AUTO: 9.8 FL (ref 9–13)
POTASSIUM SERPL-SCNC: 4.3 MMOL/L (ref 3.5–5.5)
PROT SERPL-MCNC: 7.3 G/DL (ref 6.4–8.3)
RBC # BLD AUTO: 4.73 M/UL (ref 3.8–5.2)
SODIUM SERPL-SCNC: 140 MMOL/L (ref 133–145)
TRIGL SERPL-MCNC: 169 MG/DL (ref 40–149)
TSH SERPL DL<=0.005 MIU/L-ACNC: 3.21 MCU/ML (ref 0.27–4.2)
VLDLC SERPL CALC-MCNC: 34 MG/DL (ref 8–30)
WBC # BLD AUTO: 6.7 K/UL (ref 4–11)

## 2019-12-04 ENCOUNTER — APPOINTMENT (OUTPATIENT)
Dept: GENERAL RADIOLOGY | Age: 44
End: 2019-12-04
Attending: PHYSICIAN ASSISTANT
Payer: MEDICARE

## 2019-12-04 ENCOUNTER — HOSPITAL ENCOUNTER (EMERGENCY)
Age: 44
Discharge: HOME | End: 2019-12-04
Attending: EMERGENCY MEDICINE | Admitting: EMERGENCY MEDICINE
Payer: MEDICARE

## 2019-12-04 VITALS
SYSTOLIC BLOOD PRESSURE: 172 MMHG | WEIGHT: 245 LBS | RESPIRATION RATE: 16 BRPM | TEMPERATURE: 97.9 F | HEART RATE: 81 BPM | OXYGEN SATURATION: 100 % | BODY MASS INDEX: 35.07 KG/M2 | HEIGHT: 70 IN | DIASTOLIC BLOOD PRESSURE: 108 MMHG

## 2019-12-04 DIAGNOSIS — M79.645 FINGER PAIN, LEFT: Primary | ICD-10-CM

## 2019-12-04 PROCEDURE — 99282 EMERGENCY DEPT VISIT SF MDM: CPT

## 2019-12-04 PROCEDURE — 73140 X-RAY EXAM OF FINGER(S): CPT

## 2019-12-04 RX ORDER — CEPHALEXIN 500 MG/1
500 CAPSULE ORAL 4 TIMES DAILY
Qty: 28 CAP | Refills: 0 | Status: SHIPPED | OUTPATIENT
Start: 2019-12-04 | End: 2019-12-11

## 2019-12-04 RX ORDER — IBUPROFEN 600 MG/1
600 TABLET ORAL
Qty: 20 TAB | Refills: 0 | Status: SHIPPED | OUTPATIENT
Start: 2019-12-04 | End: 2021-10-06 | Stop reason: ALTCHOICE

## 2019-12-04 NOTE — LETTER
[address] [facility] EMERGENCY DEPT 
[address] 
[address] [facility] [address] [phone] Work/School Note Date: 12/4/2019 To Whom It May concern: 
 
Jose G Hollis was seen and treated today in the emergency room by the following provider(s): 
Attending Provider: Bobie Felty, MD 
Physician Assistant: Darylene Coupe, PA.   
 
Jose G Hollis may return to work/school on 12/5/19 Sincerely, Eunice Keller
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
## 2019-12-05 NOTE — ED TRIAGE NOTES
Patient comes in stating that it feels like she has glass in her finger because it really bothers her when she bends it. Patient states that a couple of months ago, she had a splinter in it and thinks that maybe she didn't get the entire splinter out. Patient states that it is dark also.

## 2020-07-13 ENCOUNTER — VIRTUAL VISIT (OUTPATIENT)
Dept: FAMILY MEDICINE CLINIC | Age: 45
End: 2020-07-13

## 2020-07-13 DIAGNOSIS — I10 ESSENTIAL HYPERTENSION: ICD-10-CM

## 2020-07-13 DIAGNOSIS — F17.210 CIGARETTE NICOTINE DEPENDENCE WITHOUT COMPLICATION: ICD-10-CM

## 2020-07-13 DIAGNOSIS — K21.9 GASTROESOPHAGEAL REFLUX DISEASE, ESOPHAGITIS PRESENCE NOT SPECIFIED: Primary | ICD-10-CM

## 2020-07-13 DIAGNOSIS — F33.2 SEVERE EPISODE OF RECURRENT MAJOR DEPRESSIVE DISORDER, WITHOUT PSYCHOTIC FEATURES (HCC): ICD-10-CM

## 2020-07-13 RX ORDER — ESOMEPRAZOLE MAGNESIUM 40 MG/1
CAPSULE, DELAYED RELEASE ORAL
Qty: 90 CAP | Refills: 1 | Status: SHIPPED | OUTPATIENT
Start: 2020-07-13 | End: 2021-03-31

## 2020-07-13 RX ORDER — VALSARTAN AND HYDROCHLOROTHIAZIDE 160; 12.5 MG/1; MG/1
1 TABLET, FILM COATED ORAL DAILY
Qty: 90 TAB | Refills: 1 | Status: SHIPPED | OUTPATIENT
Start: 2020-07-13 | End: 2021-02-09 | Stop reason: SDUPTHER

## 2020-07-13 RX ORDER — VALSARTAN AND HYDROCHLOROTHIAZIDE 80; 12.5 MG/1; MG/1
1 TABLET, FILM COATED ORAL DAILY
Qty: 90 TAB | Refills: 1 | Status: CANCELLED | OUTPATIENT
Start: 2020-07-13

## 2020-07-13 RX ORDER — BUPROPION HYDROCHLORIDE 150 MG/1
150 TABLET, EXTENDED RELEASE ORAL 2 TIMES DAILY
Qty: 180 TAB | Refills: 1 | Status: SHIPPED | OUTPATIENT
Start: 2020-07-13 | End: 2021-02-09 | Stop reason: SDUPTHER

## 2020-07-13 NOTE — PROGRESS NOTES
Rainer Mcnally is a 38 y.o. female who was seen by synchronous (real-time) audio-video technology on 7/13/2020 for GERD (Left side, tightness)        Assessment & Plan:   Diagnoses and all orders for this visit:    1. Gastroesophageal reflux disease, esophagitis presence not specified-if sxs persist after 2-3 weeks, will do imaging or refer to GI  -     esomeprazole (NEXIUM) 40 mg capsule; TAKE 1 CAPSULE BY MOUTH EVERY DAY    2. Essential hypertension-will inc dose of Valsartan  -     valsartan-hydroCHLOROthiazide (DIOVAN-HCT) 160-12.5 mg per tablet; Take 1 Tab by mouth daily. 3. Severe episode of recurrent major depressive disorder, without psychotic features (HCC)-continue with Wellbutrin    4. Cigarette nicotine dependence without complication-doing better; continue with wellbutrin          Follow-up and Dispositions    · Return in about 3 months (around 10/13/2020) for follow up. 712  Subjective:     Health Maintenance Due   Topic Date Due    Pneumococcal 0-64 years (1 of 1 - PPSV23) 08/29/1980    DTaP/Tdap/Td series (1 - Tdap) 08/29/1995    A1C test (Diabetic or Prediabetic)  05/09/2018    PAP AKA CERVICAL CYTOLOGY  07/06/2020     Abdominal pain that started for months now; getting worse; laying down used to make it better  These days she has to bend over or lean back to get comfortable  Worse with constipation-takes gas pills; describes it as a \"fist that is boiled up that does not go away for the last few weeks\"  Zantac not working;  Nexium worked better in the past-wants a refill      Needs refill of BP med  BP Readings from Last 3 Encounters:   12/04/19 (!) 172/108   11/04/19 (!) 149/100   05/03/18 154/88   3 days of the week it is elevated she admits-dBP usually above 90's     Wellbutrin for both smoking and depression-needs refill  Picking her hair more than ever during this quarantine  Moved to a new place, so smoking a lot less        Lab Results   Component Value Date/Time Hemoglobin A1c 5.9 (H) 05/09/2017 09:13 AM    Hemoglobin A1c (POC) 5.5 11/03/2016 10:22 AM     Lab Results   Component Value Date/Time    Glucose 97 11/04/2019 09:26 AM    Glucose  05/02/2016 10:00 AM       Prior to Admission medications    Medication Sig Start Date End Date Taking? Authorizing Provider   ibuprofen (MOTRIN) 600 mg tablet Take 1 Tab by mouth every six (6) hours as needed for Pain. 12/4/19  Yes Dania Shaw PA   buPROPion SR (WELLBUTRIN SR) 150 mg SR tablet Take 1 Tab by mouth two (2) times a day. 11/6/19  Yes Eliana Tanner MD   valsartan-hydroCHLOROthiazide (DIOVAN HCT) 80-12.5 mg per tablet Take 1 Tab by mouth daily. 11/6/19  Yes Eliana Tanner MD   esomeprazole (NEXIUM) 40 mg capsule TAKE 1 CAPSULE BY MOUTH EVERY DAY 11/6/19   Lizette Matias MD     Patient Active Problem List    Diagnosis Date Noted    Severe obesity (BMI 35.0-39. 9) with comorbidity ([address] 05/03/2018    Trichotillomania 10/29/2017    Severe episode of recurrent major depressive disorder, without psychotic features ([address] 11/03/2016    Adjustment insomnia 11/03/2016    Advance directive declined by patient 08/02/2016    Hyperlipidemia 05/02/2016    Vitamin D deficiency 05/02/2016    Ovarian cyst, bilateral 04/10/2014    Fibroid 12/12/2013    Essential hypertension, benign 02/23/2012    Impaired fasting glucose 02/23/2012    Obesity (BMI 30-39.9) 02/23/2012    Tobacco abuse 02/23/2012    Gastroparesis 02/23/2012    Chronic female pelvic pain 11/08/2011    Dysmenorrhea 11/08/2011       ROS  Pt denies: Wt loss, Fever/Chills, HA, Visual changes, Fatigue, Chest pain, SOB, ISLAS, N/V/D/C, Blood in stool or urine, Edema. Pertinent positive as above in HPI.  All others were negative    Objective:     Patient-Reported Vitals 7/13/2020   Patient-Reported Weight 245   Patient-Reported Height 5 10\"   Patient-Reported Pulse 86   Patient-Reported Temperature 98.8   Patient-Reported Systolic  318 Patient-Reported Diastolic 89   Patient-Reported LMP Twice in june      General: alert, cooperative, no distress   Mental  status: normal mood, behavior, speech, dress, motor activity, and thought processes, able to follow commands   HENT: NCAT   Neck: no visualized mass   Resp: no respiratory distress   Neuro: no gross deficits   Skin: no discoloration or lesions of concern on visible areas   Psychiatric: normal affect, consistent with stated mood, no evidence of hallucinations     Additional exam findings: none      We discussed the expected course, resolution and complications of the diagnosis(es) in detail. Medication risks, benefits, costs, interactions, and alternatives were discussed as indicated. I advised her to contact the office if her condition worsens, changes or fails to improve as anticipated. She expressed understanding with the diagnosis(es) and plan. Jefferson Nava, who was evaluated through a patient-initiated, synchronous (real-time) audio-video encounter, and/or her healthcare decision maker, is aware that it is a billable service, with coverage as determined by her insurance carrier. She provided verbal consent to proceed: Yes, and patient identification was verified. It was conducted pursuant to the emergency declaration under the [facility], [address] [place] [address] authority and the [employer] Act. A caregiver was present when appropriate. Ability to conduct physical exam was limited. I was at home. The patient was at home.       Nasrin Newton MD

## 2020-07-13 NOTE — PROGRESS NOTES
Chief Complaint   Patient presents with    GERD     Left side, tightness     1. Have you been to the ER, urgent care clinic since your last visit? Hospitalized since your last visit? No    2. Have you seen or consulted any other health care providers outside of the [address] [place] [address] since your last visit? Include any pap smears or colon screening.  No

## 2020-07-13 NOTE — PATIENT INSTRUCTIONS
Gastroesophageal Reflux Disease (GERD): Care Instructions Your Care Instructions Gastroesophageal reflux disease (GERD) is the backward flow of stomach acid into the esophagus. The esophagus is the tube that leads from your throat to your stomach. A one-way valve prevents the stomach acid from backing up into this tube. When you have GERD, this valve does not close tightly enough. This can also cause pain and swelling in your esophagus (esophagitis). If you have mild GERD symptoms including heartburn, you may be able to control the problem with antacids or over-the-counter medicine. Changing your diet and eating habits, such as not eating late at night, losing weight, and making other lifestyle changes can also help reduce symptoms. Follow-up care is a key part of your treatment and safety. Be sure to make and go to all appointments, and call your doctor if you are having problems. It's also a good idea to know your test results and keep a list of the medicines you take. How can you care for yourself at home? · Take your medicines exactly as prescribed. Call your doctor if you think you are having a problem with your medicine. · Your doctor may recommend over-the-counter medicine. For mild or occasional indigestion, antacids, such as Tums, Gaviscon, Mylanta, or Maalox, may help. Your doctor also may recommend over-the-counter acid reducers, such as Pepcid AC (famotidine), Tagamet HB (cimetidine), or Prilosec (omeprazole). Read and follow all instructions on the label. If you use these medicines often, talk with your doctor. · Change your eating habits. ? It's best to eat several small meals instead of two or three large meals. ? After you eat, wait 2 to 3 hours before you lie down. ? Chocolate, mint, and alcohol can make GERD worse. ? Spicy foods, foods that have a lot of acid (like tomatoes and oranges), and coffee can make GERD symptoms worse in some people.  If your symptoms are worse after you eat a certain food, you may want to stop eating that food to see if your symptoms get better. · Do not smoke or chew tobacco. Smoking can make GERD worse. If you need help quitting, talk to your doctor about stop-smoking programs and medicines. These can increase your chances of quitting for good. · If you have GERD symptoms at night, raise the head of your bed 6 to 8 inches by putting the frame on blocks or placing a foam wedge under the head of your mattress. (Adding extra pillows does not work.) · Do not wear tight clothing around your middle. · Lose weight if you need to. Losing just 5 to 10 pounds can help. When should you call for help? Call your doctor now or seek immediate medical care if: 
· You have new or different belly pain. · Your stools are black and tarlike or have streaks of blood. Watch closely for changes in your health, and be sure to contact your doctor if: 
· Your symptoms have not improved after 2 days. · Food seems to catch in your throat or chest. 
Where can you learn more? Go to http://noelle-vaughn.info/ Enter S341 in the search box to learn more about \"Gastroesophageal Reflux Disease (GERD): Care Instructions. \" Current as of: August 12, 2019               Content Version: 12.5 © 3368-0704 Healthwise, Incorporated. Care instructions adapted under license by [employer] (which disclaims liability or warranty for this information). If you have questions about a medical condition or this instruction, always ask your healthcare professional. [place] [address] any warranty or liability for your use of this information. Gastroesophageal Reflux Disease (GERD): Care Instructions Your Care Instructions Gastroesophageal reflux disease (GERD) is the backward flow of stomach acid into the esophagus.  The esophagus is the tube that leads from your throat to your stomach. A one-way valve prevents the stomach acid from backing up into this tube. When you have GERD, this valve does not close tightly enough. This can also cause pain and swelling in your esophagus (esophagitis). If you have mild GERD symptoms including heartburn, you may be able to control the problem with antacids or over-the-counter medicine. Changing your diet and eating habits, such as not eating late at night, losing weight, and making other lifestyle changes can also help reduce symptoms. Follow-up care is a key part of your treatment and safety. Be sure to make and go to all appointments, and call your doctor if you are having problems. It's also a good idea to know your test results and keep a list of the medicines you take. How can you care for yourself at home? · Take your medicines exactly as prescribed. Call your doctor if you think you are having a problem with your medicine. · Your doctor may recommend over-the-counter medicine. For mild or occasional indigestion, antacids, such as Tums, Gaviscon, Mylanta, or Maalox, may help. Your doctor also may recommend over-the-counter acid reducers, such as Pepcid AC (famotidine), Tagamet HB (cimetidine), or Prilosec (omeprazole). Read and follow all instructions on the label. If you use these medicines often, talk with your doctor. · Change your eating habits. ? It's best to eat several small meals instead of two or three large meals. ? After you eat, wait 2 to 3 hours before you lie down. ? Chocolate, mint, and alcohol can make GERD worse. ? Spicy foods, foods that have a lot of acid (like tomatoes and oranges), and coffee can make GERD symptoms worse in some people. If your symptoms are worse after you eat a certain food, you may want to stop eating that food to see if your symptoms get better. · Do not smoke or chew tobacco. Smoking can make GERD worse.  If you need help quitting, talk to your doctor about stop-smoking programs and medicines. These can increase your chances of quitting for good. · If you have GERD symptoms at night, raise the head of your bed 6 to 8 inches by putting the frame on blocks or placing a foam wedge under the head of your mattress. (Adding extra pillows does not work.) · Do not wear tight clothing around your middle. · Lose weight if you need to. Losing just 5 to 10 pounds can help. When should you call for help? Call your doctor now or seek immediate medical care if: 
· You have new or different belly pain. · Your stools are black and tarlike or have streaks of blood. Watch closely for changes in your health, and be sure to contact your doctor if: 
· Your symptoms have not improved after 2 days. · Food seems to catch in your throat or chest. 
Where can you learn more? Go to http://noelle-vaughn.info/ Enter M020 in the search box to learn more about \"Gastroesophageal Reflux Disease (GERD): Care Instructions. \" Current as of: August 12, 2019               Content Version: 12.5 © 6816-3086 Healthwise, Incorporated. Care instructions adapted under license by [employer] (which disclaims liability or warranty for this information). If you have questions about a medical condition or this instruction, always ask your healthcare professional. [address] any warranty or liability for your use of this information.

## 2020-11-08 ENCOUNTER — HOSPITAL ENCOUNTER (EMERGENCY)
Age: 45
Discharge: HOME | End: 2020-11-08
Attending: EMERGENCY MEDICINE
Payer: MEDICARE

## 2020-11-08 VITALS
HEART RATE: 79 BPM | RESPIRATION RATE: 20 BRPM | TEMPERATURE: 97.9 F | OXYGEN SATURATION: 99 % | BODY MASS INDEX: 33.93 KG/M2 | WEIGHT: 237 LBS | SYSTOLIC BLOOD PRESSURE: 146 MMHG | HEIGHT: 70 IN | DIASTOLIC BLOOD PRESSURE: 98 MMHG

## 2020-11-08 DIAGNOSIS — K04.7 DENTAL INFECTION: Primary | ICD-10-CM

## 2020-11-08 PROCEDURE — 74011000250 HC RX REV CODE- 250: Performed by: EMERGENCY MEDICINE

## 2020-11-08 PROCEDURE — 99283 EMERGENCY DEPT VISIT LOW MDM: CPT

## 2020-11-08 PROCEDURE — 74011250637 HC RX REV CODE- 250/637: Performed by: EMERGENCY MEDICINE

## 2020-11-08 PROCEDURE — 75810000283 HC INJECTION NERVE BLOCK

## 2020-11-08 RX ORDER — PENICILLIN V POTASSIUM 500 MG/1
500 TABLET, FILM COATED ORAL 4 TIMES DAILY
Qty: 40 TAB | Refills: 0 | Status: SHIPPED | OUTPATIENT
Start: 2020-11-08 | End: 2020-11-18

## 2020-11-08 RX ORDER — BUPIVACAINE HYDROCHLORIDE 5 MG/ML
5 INJECTION, SOLUTION EPIDURAL; INTRACAUDAL ONCE
Status: COMPLETED | OUTPATIENT
Start: 2020-11-08 | End: 2020-11-08

## 2020-11-08 RX ORDER — LIDOCAINE HYDROCHLORIDE AND EPINEPHRINE 20; 10 MG/ML; UG/ML
10 INJECTION, SOLUTION INFILTRATION; PERINEURAL ONCE
Status: COMPLETED | OUTPATIENT
Start: 2020-11-08 | End: 2020-11-08

## 2020-11-08 RX ORDER — PENICILLIN V POTASSIUM 250 MG/1
500 TABLET, FILM COATED ORAL
Status: COMPLETED | OUTPATIENT
Start: 2020-11-08 | End: 2020-11-08

## 2020-11-08 RX ADMIN — BUPIVACAINE HYDROCHLORIDE 5 MG: 5 INJECTION, SOLUTION EPIDURAL; INTRACAUDAL; PERINEURAL at 08:05

## 2020-11-08 RX ADMIN — LIDOCAINE HYDROCHLORIDE,EPINEPHRINE BITARTRATE 200 MG: 20; .01 INJECTION, SOLUTION INFILTRATION; PERINEURAL at 08:05

## 2020-11-08 RX ADMIN — PENICILLIN V POTASSIUM 500 MG: 250 TABLET, FILM COATED ORAL at 08:10
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
## 2020-11-08 NOTE — DISCHARGE INSTRUCTIONS
Patient Education        Abscessed Tooth: Care Instructions  Your Care Instructions     An abscessed tooth is a tooth that has a pocket of pus in the tissues around it. Pus forms when the body tries to fight an infection caused by bacteria. If the pus cannot drain, it forms an abscess. An abscessed tooth can cause red, swollen gums and throbbing pain, especially when you chew. You may have a bad taste in your mouth and a fever, and your jaw may swell. Damage to the tooth, untreated tooth decay, or gum disease can cause an abscessed tooth. An abscessed tooth needs to be treated by a dental professional right away. If it is not treated, the infection could spread to other parts of your body. Your dentist will give you antibiotics to stop the infection. He or she may make a hole in the tooth or cut open (juan) the abscess inside your mouth so that the infection can drain, which should relieve your pain. You may need to have a root canal treatment, which tries to save your tooth by taking out the infected pulp and replacing it with a healing medicine and/or a filling. If these treatments do not work, your tooth may have to be removed. Follow-up care is a key part of your treatment and safety. Be sure to make and go to all appointments, and call your doctor if you are having problems. It's also a good idea to know your test results and keep a list of the medicines you take. How can you care for yourself at home? · Reduce pain and swelling in your face and jaw by putting ice or a cold pack on the outside of your cheek. Do this for 10 to 20 minutes at a time. Put a thin cloth between the ice and your skin. · Take pain medicines exactly as directed. ? If the doctor gave you a prescription medicine for pain, take it as prescribed. ? If you are not taking a prescription pain medicine, ask your doctor if you can take an over-the-counter medicine. · Take antibiotics as directed.  Do not stop taking them just because you feel better. You need to take the full course of antibiotics. To prevent tooth abscess  · Brush and floss every day. Have regular dental checkups. · Eat a healthy diet. Avoid sugary foods and drinks. · Do not smoke or vape with nicotine. And don't use spit tobacco. Tobacco and nicotine slow your ability to heal. They increase your risk for gum disease and cancer of the mouth and throat. If you need help quitting, talk to your doctor about stop-smoking programs and medicines. These can increase your chances of quitting for good. When should you call for help? Call 911 anytime you think you may need emergency care. For example, call if:    · You have trouble breathing. Call your doctor now or seek immediate medical care if:    · You have new or worse symptoms of infection, such as:  ? Increased pain, swelling, warmth, or redness. ? Red streaks leading from the area. ? Pus draining from the area. ? A fever. Watch closely for changes in your health, and be sure to contact your doctor if:    · You do not get better as expected. Where can you learn more? Go to http://www.gray.com/  Enter L466 in the search box to learn more about \"Abscessed Tooth: Care Instructions. \"  Current as of: March 25, 2020               Content Version: 12.6  © 7571-7052 [employer]. Care instructions adapted under license by [employer] (which disclaims liability or warranty for this information). If you have questions about a medical condition or this instruction, always ask your healthcare professional. [place] [address] any warranty or liability for your use of this information.

## 2021-02-09 ENCOUNTER — VIRTUAL VISIT (OUTPATIENT)
Dept: FAMILY MEDICINE CLINIC | Age: 46
End: 2021-02-09
Payer: MEDICARE

## 2021-02-09 DIAGNOSIS — U07.1 COVID-19: Primary | ICD-10-CM

## 2021-02-09 DIAGNOSIS — E66.01 SEVERE OBESITY (BMI 35.0-39.9) WITH COMORBIDITY (HCC): ICD-10-CM

## 2021-02-09 DIAGNOSIS — I73.9 PAD (PERIPHERAL ARTERY DISEASE) (HCC): ICD-10-CM

## 2021-02-09 DIAGNOSIS — F17.210 CIGARETTE NICOTINE DEPENDENCE WITHOUT COMPLICATION: ICD-10-CM

## 2021-02-09 DIAGNOSIS — G89.29 CHRONIC LUQ PAIN: ICD-10-CM

## 2021-02-09 DIAGNOSIS — F33.2 SEVERE EPISODE OF RECURRENT MAJOR DEPRESSIVE DISORDER, WITHOUT PSYCHOTIC FEATURES (HCC): ICD-10-CM

## 2021-02-09 DIAGNOSIS — I10 ESSENTIAL HYPERTENSION: ICD-10-CM

## 2021-02-09 DIAGNOSIS — R10.12 CHRONIC LUQ PAIN: ICD-10-CM

## 2021-02-09 DIAGNOSIS — F51.02 ADJUSTMENT INSOMNIA: ICD-10-CM

## 2021-02-09 PROCEDURE — G8756 NO BP MEASURE DOC: HCPCS | Performed by: INTERNAL MEDICINE

## 2021-02-09 PROCEDURE — G8427 DOCREV CUR MEDS BY ELIG CLIN: HCPCS | Performed by: INTERNAL MEDICINE

## 2021-02-09 PROCEDURE — 99214 OFFICE O/P EST MOD 30 MIN: CPT | Performed by: INTERNAL MEDICINE

## 2021-02-09 PROCEDURE — G9717 DOC PT DX DEP/BP F/U NT REQ: HCPCS | Performed by: INTERNAL MEDICINE

## 2021-02-09 PROCEDURE — G0463 HOSPITAL OUTPT CLINIC VISIT: HCPCS | Performed by: INTERNAL MEDICINE

## 2021-02-09 RX ORDER — VALSARTAN AND HYDROCHLOROTHIAZIDE 160; 12.5 MG/1; MG/1
1 TABLET, FILM COATED ORAL DAILY
Qty: 90 TAB | Refills: 1 | Status: SHIPPED | OUTPATIENT
Start: 2021-02-09 | End: 2021-11-07

## 2021-02-09 RX ORDER — BUPROPION HYDROCHLORIDE 150 MG/1
150 TABLET, EXTENDED RELEASE ORAL 2 TIMES DAILY
Qty: 180 TAB | Refills: 1 | Status: SHIPPED | OUTPATIENT
Start: 2021-02-09 | End: 2021-03-31

## 2021-02-09 RX ORDER — TRAZODONE HYDROCHLORIDE 50 MG/1
TABLET ORAL
Qty: 90 TAB | Refills: 1 | Status: SHIPPED | OUTPATIENT
Start: 2021-02-09 | End: 2021-03-31

## 2021-02-09 NOTE — PROGRESS NOTES
Patient: Moraima Medrano Date: 4/3/2017   : 1966 Attending: Corona Camarena DO   50 year old female      GI CONSULT    Consult Diagnosis:  abd pain/ colitis    HPI: This is a 49 yo female new to our service with a pmhx of chronic back pain on chronic opioids, asthma, GERD, HTN, COPD, ALEXANDER, GREGORY, and dysmenorrhea who presented to [facility] ED on  with acute abdomina pain with diarrhea. Labs in ED revealed WBC 13.6, Hgb 12.4, HCt 38.2, creat 1.32, LFTs nl, lactic acid 0.5. Pelvic US noted probable uterine fibroid. CT A/P noted subtle pericolonic inflammation at the junction of the descending and sigmoid portions of the colon without visualization of diverticula. She was started on IV abx and stool studies have been ordered and pending. No BM since admission. Pt denies prior EGD or colonoscopy. No family hx GI malignancy. States LLQ pain is cramping/ pressure. Pain is constant. No upper GI symptoms of n/v, reflux or dysphagia. Reports normal bowel pattern is every third day. Takes prn miralax/ fiber. Last BM was  1 x watery stool. Prior to that last BM was Thursday. No hematochezia or melena. Denies recent abx use or sick contacts. Reports eating a salad at school resulting in abdominal pain.      Past Medical History:    Asthma                                                        Allergy                                                         Comment: Seasonal    Anemia                                                          Comment: Iron deficiency    Gastro-oesophageal reflux disease                             Essential (primary) hypertension                              Anxiety                                                       COPD (chronic obstructive pulmonary disease)                  Pneumonia                                                     Esophageal reflux                                             Difficult intubation                                          Sleep apnea          Chief Complaint   Patient presents with    Follow-up     Covid    Medication Refill         1. Have you been to the ER, urgent care clinic since your last visit? Hospitalized since your last visit? No    2. Have you seen or consulted any other health care providers outside of the [address] [place] [address] since your last visit? Include any pap smears or colon screening.  No     Health Maintenance Due   Topic    Pneumococcal 0-64 years (1 of 1 - PPSV23)    COVID-19 Vaccine (1 of 2)    DTaP/Tdap/Td series (1 - Tdap)    A1C test (Diabetic or Prediabetic)     PAP AKA CERVICAL CYTOLOGY     Flu Vaccine (1)    Medicare Yearly Exam                                           Vertigo                                                       Abnormal uterine bleeding                                     Fibroid                                                       Abnormal Pap smear                                              Comment: Hx ASCUS. Hx HPV +.    Dysmenorrhea                                                  Dyspareunia                                                   Urinary incontinence                                          Bronchitis                                                    Chronic pain                                                  Past Surgical History:   Procedure Laterality Date   • BACK SURGERY     •  SECTION, CLASSIC      x2   • COLPOSCOPY     • TUBAL LIGATION         Family History   Problem Relation Age of Onset   • Hypertension Mother    • Prostate Cancer Father    • Heart disease Father    • Hypertension Father    • High blood pressure Other    • Cancer Other      Female  gynecological cancers, male pancreatic cancer   • Cancer Maternal Uncle    • Hypertension Sister    • Hypertension Brother    • Ovarian cancer Maternal Aunt      30's   • Breast cancer Neg Hx    • Colon cancer Neg Hx    • Diabetes Neg Hx        ALLERGIES:   Allergen Reactions   • Ace Inhibitors SWELLING     Angioedema   • Lisinopril SWELLING     angioedema       Social History:  Social History   Substance Use Topics   • Smoking status: Current Every Day Smoker     Packs/day: 0.25     Years: 25.00     Types: Cigarettes   • Smokeless tobacco: Never Used      Comment: 1 cigarette/day   • Alcohol use No       Scheduled Inpatient Meds  • predniSONE  10 mg Oral Daily   • pregabalin  50 mg Oral Daily   • cloNIDine  1 patch Transdermal Once per day on Mon   • gabapentin  800 mg Oral 2 times per day   • allopurinol  300 mg Oral Daily   • loratadine  10 mg Oral Q Evening   • tiZANidine  2 mg Oral TID   • amLODIPine  10 mg Oral Daily   •  fluticasone-salmeterol  1 puff Inhalation BID Resp   • montelukast  10 mg Oral Nightly   • tiotropium  18 mcg Inhalation Daily   • metroNIDAZOLE (FLAGYL) IVPB  500 mg Intravenous 4 times per day   • levofloxacin  750 mg Intravenous Every Other Day   • sodium chloride (PF)  2 mL Injection 2 times per day   • enoxaparin (LOVENOX) injection  40 mg Subcutaneous Q24H   • cloNIDine  1 patch Transdermal Once per day on Mon Prescriptions Prior to Admission   Medication Sig Dispense Refill   • predniSONE (DELTASONE) 10 MG tablet Take 10 mg by mouth daily.     • pregabalin (LYRICA) 50 MG capsule Take 50 mg by mouth daily.     • HYDROcodone-acetaminophen (NORCO) 5-325 MG per tablet Take 1 tablet by mouth every 6 hours as needed for Pain. 20 tablet 0   • cloNIDine (CATAPRES-TTS 1) 0.1 MG/24HR Place 1 patch onto the skin once a week. 4 patch 12   • gabapentin (NEURONTIN) 800 MG tablet Take 800 mg by mouth 2 times daily.     • polyethylene glycol (GLYCOLAX, MIRALAX) packet Take 17 g by mouth daily as needed (constipation). 14 each 0   • allopurinol (ZYLOPRIM) 300 MG tablet Take 300 mg by mouth daily.      • CALCIUM CARBONATE-VITAMIN D PO Take 1 tablet by mouth daily.     • ALPRAZolam (XANAX) 0.5 MG tablet Take 0.5 mg by mouth 3 times daily as needed for Anxiety.      • cetirizine (ZYRTEC) 10 MG tablet Take 10 mg by mouth nightly.     • albuterol-ipratropium 2.5 mg/0.5 mg (DUONEB) 0.5-2.5 (3) MG/3ML nebulizer solution Take 3 mLs by nebulization every 4 hours as needed. Indications: shortness of breath/wheezing     • albuterol 108 (90 BASE) MCG/ACT inhaler Inhale 2 puffs into the lungs every 6 hours as needed for Shortness of Breath or Wheezing. 1 Inhaler 0   • tizanidine (ZANAFLEX) 4 MG capsule Take 1 capsule by mouth 4 times daily as needed for Muscle spasms. 40 capsule 0   • Multiple Vitamin tablet Take 1 tablet by mouth daily.     • amLODIPine (NORVASC) 10 MG tablet Take 1 tablet by mouth daily. 30 tablet 0   •  hydrochlorothiazide (HYDRODIURIL) 25 MG tablet Take 1 tablet by mouth daily. 30 tablet 1   • Fluticasone-Salmeterol (ADVAIR) 500-50 MCG/DOSE AEPB Inhale 1 puff into the lungs two times daily.       • tiotropium (SPIRIVA) 18 MCG inhalation capsule Place 18 mcg into inhaler and inhale daily.       • montelukast (SINGULAIR) 10 MG tablet Take 10 mg by mouth nightly.            Review of Systems:  General-  Intermittent fevers chills, Denies weight loss, fatigue  HEENT-  Denies headache, vision changes, rhinorrhea, sore throat  Respiratory- reports SOB, no hemoptysis  Cardiac- Denies CP, palpitations  GI- as per HPI  Genitourinary-  Denies dysuria, hematuria  Skin- Denies rash, skin changes, jaundice  Psych-  Hx depression and anxiety  Neuro-  Denies dizziness, focal weakness, paresthesias  Hematologic- Denies history of bleeding disorder or bleeding problems  Musculoskeletal- Denies myalgias, arthralgias    Physical Exam:   Vitals:    Visit Vitals   • /66 ([facility], Patient Position: Supine)   • Pulse 71   • Temp 98.4 °F (36.9 °C) (Oral)   • Resp 18   • Ht 5' 2\" (1.575 m)   • Wt 101.2 kg   • LMP 03/23/2017 (Exact Date)   • SpO2 98%   • BMI 40.79 kg/m2      General appearance: alert, appears stated age and cooperative, NAD  HEENT: Normocephalic, atraumatic. No scleral icterus. Nares patent. Oral mucosa moist.   Neck: no adenopathy and supple, symmetrical, trachea midline  Lungs: clear to auscultation bilaterally  Heart: regular rate and rhythm, S1, S2 normal, no murmur, click, rub or gallop  Abdomen: Soft, LLQ tenderness, no guarding, BS +, non-distended, no masses or organomegaly  Rectal:  Deferred  Genitourinary:  Deferred  Extremities: extremities normal, atraumatic, no cyanosis or edema  Skin:  Warm and dry without rash  Neurologic: Grossly normal, no focal deficits     Labs:    Recent Labs  Lab 04/03/17  0610 04/02/17 2015   WBC 10.6 13.6*   HCT 34.9* 38.2   HGB 11.0* 12.4   MCV 81.5 81.3     419   SODIUM 141 142   POTASSIUM 3.3* 4.2   GLUCOSE 124* 103*   BUN 12 14   CREATININE 0.99* 1.32*   AST  --  20   GPT  --  27   ALKPT  --  96   BILIRUBIN  --  0.3   ALBUMIN  --  3.7       Enteric pathogens pending      Radiology Findings:  Pelvis US 4/2  IMPRESSION:     1. No evidence of ovarian torsion or tubo-ovarian abscess.  2. Simple cyst within left ovary measuring up to 4 cm. In a premenopausal  patient this requires no further follow-up; however, in a postmenopausal  patient further evaluation in one year's time could be considered.  3. Subcentimeter intramural uterine leiomyoma.    CT A/P 4/2  IMPRESSION:     1. Subtle pericolonic inflammation at the junction of the descending and  sigmoid portions of the colon without visualization of diverticula.  Findings are nonspecific but suggests very localized colitis. Findings may  be on the basis of infectious or inflammatory etiology. Clinical  correlation is recommended. Colonoscopy may be indicated for persistent  symptomatology.  2. 4 cm left ovarian cyst. Ultrasound follow-up as clinically indicated.       Pathology Findings:  n/a    Assessment:  1. 49 yo female with hx of chronic pain on chronic narcotics presenting with acute LLQ abdominal pain with diarrhea. Pelvic US noting probable uterine fibroid. CT A/P noting subtle pericolonic inflammation at junction of secending and sigmoid colon. Findings nonspecific for colitis; infectious vs inflammatory vs other. No BM since admission. WBC nl, afebrile. No Prior EGD/colonoscopy. 1x episode of diarrhea ? Overflow secondary to constipation?  2. Acute kidney injury  3. GREGORY with Cpap use  4. Left ovarian cyst  5. Chronic back pain, narcotic dependent   6. opioid induced constipation. Miralax prn       Plans/Recommendations:  --continue abx: levaquin/ flagyl  --IVF  --await stool studies for WBC, norovirus, c.diff, enteric pathogens  --liquid diet as tolerated  --supportive cares  --if symptoms worsen consider  colonoscopy inpatient vs outpatient  --obtain KUB to comment on stool load      Thank You,   ELLE Solis with Dr. Hagan    Patient seen and examined and agree with midlevel provider note. Abdominal pain with CT showing some thickening of sigmoid/descending colon lining and enlarged left ovaria cyst. Agree with stool studies, can continue antibiotics in interval.  Monitor abdominal examination. Minimal stool on KUB. Will need colonoscopy as inpatient if not improving vs outpatient when acute issues resolved.  Domingo Hagan MD

## 2021-02-09 NOTE — PROGRESS NOTES
Helen Hall is a 54 y.o. female who was seen by synchronous (real-time) audio-video technology on 2/9/2021 for Follow-up (Covid) and Medication Refill        Assessment & Plan:   Diagnoses and all orders for this visit:    1. COVID-19-currently with mild sxs; advised on duration of quarantine and to go to ER if she experiences shortness of breath    2. Chronic LUQ pain-etio? Did not get better with PPI  -     CT ABD W WO CONT; Future    3. Essential hypertension-advised on home monitoring  -     valsartan-hydroCHLOROthiazide (DIOVAN-HCT) 160-12.5 mg per tablet; Take 1 Tab by mouth daily. 4. Severe episode of recurrent major depressive disorder, without psychotic features ([address]  -     buPROPion SR (WELLBUTRIN SR) 150 mg SR tablet; Take 1 Tab by mouth two (2) times a day. -     traZODone (DESYREL) 50 mg tablet; TAKE 1 TABLET BY MOUTH EVERY NIGHT    5. PAD (peripheral artery disease) ([facility])-discuss work up at next visit; smoking cessation advised    6. Severe obesity (BMI 35.0-39. 9) with comorbidity ([facility])-discussed limiting david to 8829-5005/day and exercising at least 150 min a week    7. Cigarette nicotine dependence without complication  -     buPROPion SR (WELLBUTRIN SR) 150 mg SR tablet; Take 1 Tab by mouth two (2) times a day. 8. Adjustment insomnia  -     traZODone (DESYREL) 50 mg tablet; TAKE 1 TABLET BY MOUTH EVERY NIGHT        Follow-up and Dispositions    · Return in about 1 month (around 3/9/2021) for follow up, annual wellness. ?seeing Psych  ? [place]  [address]  Subjective:     Health Maintenance Due   Topic Date Due    Pneumococcal 0-64 years (1 of 1 - PPSV23) 08/29/1980    COVID-19 Vaccine (1 of 2) 08/29/1990    DTaP/Tdap/Td series (1 - Tdap) 08/29/1995    A1C test (Diabetic or Prediabetic)  05/09/2018    PAP AKA CERVICAL CYTOLOGY  07/06/2020    Flu Vaccine (1) 09/01/2020    Medicare Yearly Exam  11/04/2020       Positive Covid test Thursday; exposed to family member  No sense of smell, back pain  Otherwise feels ok  Takes Vit D and natural stuff to take immune system    Stomach issues still ongoing (from 7/2020 visit)  Took Nexium-no relief  Pain is below the left breast that is not going away  Constipated at times, LUQ rad to the back    BP ok at home she says  BP Readings from Last 3 Encounters:   11/08/20 (!) 146/98   12/04/19 (!) 172/108   11/04/19 (!) 149/100     Needs refill of Wellbutrin, Diovan HCT and sleep med (Trazodone)    Seeing  eye Laser specialist, Dr Elidia Greene -follow up for lines going across her vision; Dr Harlan Saravia saw her and sent her to the specialist    Lab Results   Component Value Date/Time    Hemoglobin A1c 5.9 (H) 05/09/2017 09:13 AM    Hemoglobin A1c (POC) 5.5 11/03/2016 10:22 AM   repeat next visit    Prior to Admission medications    Medication Sig Start Date End Date Taking? Authorizing Provider   buPROPion SR [facility] SR) 150 mg SR tablet Take 1 Tab by mouth two (2) times a day. 7/13/20   Aleksandra Mazariegos MD   esomeprazole (NEXIUM) 40 mg capsule TAKE 1 CAPSULE BY MOUTH EVERY DAY 7/13/20   Aleksandra Mazariegos MD   valsartan-hydroCHLOROthiazide (DIOVAN-HCT) 160-12.5 mg per tablet Take 1 Tab by mouth daily. 7/13/20   Aleksandra Mazariegos MD   ibuprofen (MOTRIN) 600 mg tablet Take 1 Tab by mouth every six (6) hours as needed for Pain. 12/4/19   Sedalia Hatchet, PA     Patient Active Problem List    Diagnosis Date Noted    Severe obesity (BMI 35.0-39. 9) with comorbidity ([facility] [address] 05/03/2018    Trichotillomania 10/29/2017    Severe episode of recurrent major depressive disorder, without psychotic features ([facility] [address] 11/03/2016    Adjustment insomnia 11/03/2016    Advance directive declined by patient 08/02/2016    Hyperlipidemia 05/02/2016    Vitamin D deficiency 05/02/2016    Ovarian cyst, bilateral 04/10/2014    Fibroid 12/12/2013    Essential hypertension, benign 02/23/2012    Impaired fasting glucose 02/23/2012    Obesity (BMI 30-39.9) 02/23/2012    Tobacco abuse 02/23/2012    Gastroparesis 02/23/2012    Chronic female pelvic pain 11/08/2011    Dysmenorrhea 11/08/2011       ROS  Pt denies: Wt loss, Fever/Chills, HA, Visual changes, Fatigue, Chest pain, SOB, ISLAS, Abd pain, N/V/D/C, Blood in stool or urine, Edema. Pertinent positive as above in HPI. All others were negative  Objective:     Patient-Reported Vitals 7/13/2020   Patient-Reported Weight 245   Patient-Reported Height 5 10\"   Patient-Reported Pulse 86   Patient-Reported Temperature 98.8   Patient-Reported Systolic  586   Patient-Reported Diastolic 89   Patient-Reported LMP Twice in june      General: alert, cooperative, no distress   Mental  status: normal mood, behavior, speech, dress, motor activity, and thought processes, able to follow commands   HENT: NCAT   Neck: no visualized mass   Resp: no respiratory distress   Neuro: no gross deficits   Skin: no discoloration or lesions of concern on visible areas   Psychiatric: normal affect, consistent with stated mood, no evidence of hallucinations     Additional exam findings: We discussed the expected course, resolution and complications of the diagnosis(es) in detail. Medication risks, benefits, costs, interactions, and alternatives were discussed as indicated. I advised her to contact the office if her condition worsens, changes or fails to improve as anticipated. She expressed understanding with the diagnosis(es) and plan. Moreno Camilo, who was evaluated through a patient-initiated, synchronous (real-time) audio-video encounter, and/or her healthcare decision maker, is aware that it is a billable service, with coverage as determined by her insurance carrier. She provided verbal consent to proceed: Yes, and patient identification was verified.  It was conducted pursuant to the emergency declaration under the [address] [facility] waiver authority and the [place] Resources and Response Supplemental Appropriations Act. A caregiver was present when appropriate. Ability to conduct physical exam was limited. I was at home. The patient was at home.       Erma Sandoval MD

## 2021-02-09 NOTE — PATIENT INSTRUCTIONS
10 Things to Do When You Have COVID-19 Stay home. Don't go to school, work, or public areas. And don't use public transportation, ride-shares, or taxis unless you have no choice. Leave your home only if you need to get medical care. But call the doctor's office first so they know you're coming. And wear a cloth face cover. Ask before leaving isolation. Talk with your doctor or other health professional about when it will be safe for you to leave isolation. Wear a cloth face cover when you are around other people. It can help stop the spread of the virus when you cough or sneeze. Limit contact with people in your home. If possible, stay in a separate bedroom and use a separate bathroom. Avoid contact with pets and other animals. If possible, have a friend or family member care for them while you're sick. Cover your mouth and nose with a tissue when you cough or sneeze. Then throw the tissue in the trash right away. Wash your hands often, especially after you cough or sneeze. Use soap and water, and scrub for at least 20 seconds. If soap and water aren't available, use an alcohol-based hand . Don't share personal household items. These include bedding, towels, cups and glasses, and eating utensils. Clean and disinfect your home every day. Use household  or disinfectant wipes or sprays. Take special care to clean things that you grab with your hands. These include doorknobs, remote controls, phones, and handles on your refrigerator and microwave. And don't forget countertops, tabletops, bathrooms, and computer keyboards. Take acetaminophen (Tylenol) to relieve fever and body aches. Read and follow all instructions on the label. Current as of: December 18, 2020               Content Version: 12.7 © 0853-0333 [employer]. Care instructions adapted under license by [employer] (which disclaims liability or warranty for this information). If you have questions about a medical condition or this instruction, always ask your healthcare professional. [address] any warranty or liability for your use of this information.

## 2021-02-25 ENCOUNTER — HOSPITAL ENCOUNTER (OUTPATIENT)
Dept: CT IMAGING | Age: 46
Discharge: HOME | End: 2021-02-25
Attending: INTERNAL MEDICINE
Payer: MEDICARE

## 2021-02-25 DIAGNOSIS — R10.12 CHRONIC LUQ PAIN: ICD-10-CM

## 2021-02-25 DIAGNOSIS — G89.29 CHRONIC LUQ PAIN: ICD-10-CM

## 2021-02-25 PROCEDURE — 74160 CT ABDOMEN W/CONTRAST: CPT

## 2021-02-25 PROCEDURE — 74011000636 HC RX REV CODE- 636: Performed by: INTERNAL MEDICINE

## 2021-02-25 RX ADMIN — IOPAMIDOL 94 ML: 612 INJECTION, SOLUTION INTRAVENOUS at 07:29

## 2021-02-25 RX ADMIN — IOHEXOL 50 ML: 240 INJECTION, SOLUTION INTRATHECAL; INTRAVASCULAR; INTRAVENOUS; ORAL at 07:29

## 2021-03-31 ENCOUNTER — OFFICE VISIT (OUTPATIENT)
Dept: FAMILY MEDICINE CLINIC | Age: 46
End: 2021-03-31
Payer: MEDICARE

## 2021-03-31 VITALS
WEIGHT: 238.8 LBS | TEMPERATURE: 97.1 F | RESPIRATION RATE: 22 BRPM | SYSTOLIC BLOOD PRESSURE: 129 MMHG | HEART RATE: 121 BPM | DIASTOLIC BLOOD PRESSURE: 85 MMHG | BODY MASS INDEX: 34.26 KG/M2

## 2021-03-31 DIAGNOSIS — I10 ESSENTIAL HYPERTENSION, BENIGN: ICD-10-CM

## 2021-03-31 DIAGNOSIS — R07.81 RIB PAIN ON LEFT SIDE: ICD-10-CM

## 2021-03-31 DIAGNOSIS — R07.9 CHEST PAIN, UNSPECIFIED TYPE: Primary | ICD-10-CM

## 2021-03-31 DIAGNOSIS — K04.7 DENTAL ABSCESS: ICD-10-CM

## 2021-03-31 PROCEDURE — 93010 ELECTROCARDIOGRAM REPORT: CPT | Performed by: INTERNAL MEDICINE

## 2021-03-31 PROCEDURE — G0463 HOSPITAL OUTPT CLINIC VISIT: HCPCS | Performed by: INTERNAL MEDICINE

## 2021-03-31 PROCEDURE — G8417 CALC BMI ABV UP PARAM F/U: HCPCS | Performed by: INTERNAL MEDICINE

## 2021-03-31 PROCEDURE — G8752 SYS BP LESS 140: HCPCS | Performed by: INTERNAL MEDICINE

## 2021-03-31 PROCEDURE — 99214 OFFICE O/P EST MOD 30 MIN: CPT | Performed by: INTERNAL MEDICINE

## 2021-03-31 PROCEDURE — 93005 ELECTROCARDIOGRAM TRACING: CPT | Performed by: INTERNAL MEDICINE

## 2021-03-31 PROCEDURE — G8427 DOCREV CUR MEDS BY ELIG CLIN: HCPCS | Performed by: INTERNAL MEDICINE

## 2021-03-31 PROCEDURE — G9717 DOC PT DX DEP/BP F/U NT REQ: HCPCS | Performed by: INTERNAL MEDICINE

## 2021-03-31 PROCEDURE — G8754 DIAS BP LESS 90: HCPCS | Performed by: INTERNAL MEDICINE

## 2021-03-31 RX ORDER — AMOXICILLIN 500 MG/1
CAPSULE ORAL
COMMUNITY
Start: 2021-03-30 | End: 2021-10-06 | Stop reason: ALTCHOICE

## 2021-03-31 RX ORDER — CLINDAMYCIN HYDROCHLORIDE 300 MG/1
300 CAPSULE ORAL 3 TIMES DAILY
Qty: 30 CAP | Refills: 0 | Status: SHIPPED | OUTPATIENT
Start: 2021-03-31 | End: 2021-11-30 | Stop reason: SDUPTHER

## 2021-03-31 NOTE — PROGRESS NOTES
Chief Complaint   Patient presents with    Annual Wellness Visit    Follow-up     CT imaging     1. Have you been to the ER, urgent care clinic since your last visit? Hospitalized since your last visit? No    2. Have you seen or consulted any other health care providers outside of the [address] [place] [address] since your last visit? Include any pap smears or colon screening.  No    Health Maintenance Due   Topic    Hepatitis C Screening     Pneumococcal 0-64 years (1 of 1 - PPSV23)    COVID-19 Vaccine (1)    DTaP/Tdap/Td series (1 - Tdap)    A1C test (Diabetic or Prediabetic)     PAP AKA CERVICAL CYTOLOGY     Flu Vaccine (1)    Medicare Yearly Exam
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
## 2021-04-05 DIAGNOSIS — G89.29 CHRONIC LUQ PAIN: Primary | ICD-10-CM

## 2021-04-05 DIAGNOSIS — R10.12 CHRONIC LUQ PAIN: Primary | ICD-10-CM

## 2021-06-03 ENCOUNTER — PATIENT OUTREACH (OUTPATIENT)
Dept: CASE MANAGEMENT | Age: 46
End: 2021-06-03

## 2021-06-08 ENCOUNTER — PATIENT OUTREACH (OUTPATIENT)
Dept: CASE MANAGEMENT | Age: 46
End: 2021-06-08

## 2021-06-08 NOTE — PROGRESS NOTES
.Date/Time:  6/8/2021 11:09 AM    Method of communication with patient:phone    [facility] ( [facility]) made second out reach to  patient by telephone to offer Complex Case Management  ( CCM). Provided introduction to self, and explanation of the Ambulatory Care . Contact at [phone] anytime Monday thru Friday 08:00-4:30.

## 2021-06-22 ENCOUNTER — PATIENT OUTREACH (OUTPATIENT)
Dept: CASE MANAGEMENT | Age: 46
End: 2021-06-22

## 2021-06-22 NOTE — PROGRESS NOTES
.Date/Time:  6/22/2021 2:27 PM    Method of communication with patient:phone    [address] ( [facility]) made 3rd out reach to  patient by telephone to offer Complex Case Management  ( CCM). Provided introduction to self, and explanation of the Ambulatory Care . Contact at [phone] anytime Monday thru Friday 08:00-4:30.   Patient is excluded from [facility] outreach calls for 90 days

## 2021-10-06 ENCOUNTER — PATIENT OUTREACH (OUTPATIENT)
Dept: CASE MANAGEMENT | Age: 46
End: 2021-10-06

## 2021-10-06 DIAGNOSIS — R10.12 CHRONIC LUQ PAIN: Primary | ICD-10-CM

## 2021-10-06 DIAGNOSIS — K21.9 GASTROESOPHAGEAL REFLUX DISEASE, UNSPECIFIED WHETHER ESOPHAGITIS PRESENT: ICD-10-CM

## 2021-10-06 DIAGNOSIS — G89.29 CHRONIC LUQ PAIN: Primary | ICD-10-CM

## 2021-10-06 RX ORDER — ESOMEPRAZOLE MAGNESIUM 40 MG/1
40 CAPSULE, DELAYED RELEASE ORAL DAILY
COMMUNITY
Start: 2021-09-29 | End: 2021-11-30

## 2021-10-06 RX ORDER — FAMOTIDINE 20 MG/1
20 TABLET, FILM COATED ORAL EVERY 12 HOURS
COMMUNITY
Start: 2021-09-29 | End: 2021-11-30

## 2021-10-06 RX ORDER — SUCRALFATE 1 G/1
1 TABLET ORAL
COMMUNITY
Start: 2021-09-29 | End: 2021-11-30

## 2021-10-06 NOTE — PROGRESS NOTES
.  Complex Case Management      Date/Time:  10/6/2021 12:50 PM    Method of communication with patient:phone    [facility] ([facility]) contacted the patient by telephone to perform Ambulatory Care Coordination. Verified name and  with patient as identifiers. Provided introduction to self, and explanation of the Ambulatory Care Manager's role. Reviewed most recent emergency room, hospital admission or office visit w/ patient who verbalized understanding. Patient given an opportunity to ask questions. Top Challenges reviewed with the patient   1. Patient needs another GI referral >> [facility] will route note to Dr Yao Castañeda  2. S/P [facility] ED visit 2021 ( GI symptoms) Cardiac ruled out     The patient agrees to contact the PCP office or the [facility] for questions related to their healthcare. Provided contact information for future reference. Disease Specific:   N/A    Home Health Active: No    DME Active: No    Barriers to care? None    Advance Care Planning:   Does patient have an Advance Directive:  not on file; education provided     Medication(s):   Medication reconciliation was performed with patient, who verbalizes understanding of administration of home medications. There were no barriers to obtaining medications identified at this time. Referral to Pharm D needed: no     Current Outpatient Medications   Medication Sig    esomeprazole (NEXIUM) 40 mg capsule Take 40 mg by mouth daily.  famotidine (PEPCID) 20 mg tablet Take 20 mg by mouth every twelve (12) hours.  sucralfate (CARAFATE) 1 gram tablet Take 1 g by mouth.  valsartan-hydroCHLOROthiazide (DIOVAN-HCT) 160-12.5 mg per tablet Take 1 Tab by mouth daily. No current facility-administered medications for this visit.        [facility] follow up appointment(s):   Future Appointments   Date Time Provider [place]ina [place]   10/26/2021 10:45 AM MD JOHNATHON Segundo BS AMB        Non-[facility] follow up appointment(s): Goals        General      Attends follow up appointments on schedule       Patient will make and keep follow up provider appointments.   Follows nutrition recommendations and maintains goal weight (small/ frequent meals)       Patient will maintain soft texture diet  Patient will eat foods less acid  Patient will chew foods well. Patient will not use straws         Reduce Risk of Hospitalization       Patient will follow dietary recommendations  Take antacids as prescribed   Eat slower        She needs another GI doctor (pt-stated)       Patient was seen by GI doctor 2007-9. She was not happy with the procedure as she woke up during the insertion of the ET tube.         Takes all medications as ordered

## 2021-10-14 ENCOUNTER — PATIENT OUTREACH (OUTPATIENT)
Dept: CASE MANAGEMENT | Age: 46
End: 2021-10-14

## 2021-10-14 NOTE — PROGRESS NOTES
.Complex Case Management  Follow-Up       Date/Time:   10/14/2021  1:21 PM       Patient contacted [facility] ( [facility]) as to see if GI referral has been placed,    Patient reported:   Still can't describe GI symptoms. [facility] reviewed EMR and Dr Sam Jensen has sent another GI referral to Dr Pao Groves . [facility] gave patient contact number and address. Did tell patient it may take at least 2-3 weeks as most specialist offices are booked up. Pertinent concerns:  Really wants to see  A doctor that will help her with her GI symptoms       Specialist appointments since last outreach? None   If so, specialist and date: N/A    Next PCP Appointment: 10/26/2021    Medications:     New medications since last outreach: No  Does patient need refills on any medications: No   Medication changes since last outreach (dose adjustments or discontinued meds): No      Home Health company: N/A    Barriers to care? None        Patient reminded that there are physicians on call 24 hours a day / 7 days a week (M-F 5pm to 8am and from Friday 5pm until Monday 8a for the weekend) should the patient have questions or concerns.

## 2021-10-29 ENCOUNTER — PATIENT OUTREACH (OUTPATIENT)
Dept: CASE MANAGEMENT | Age: 46
End: 2021-10-29

## 2021-10-29 NOTE — PROGRESS NOTES
.Complex Case Management  Follow-Up       Date/Time:   10/29/2021  8:57AM       Ambulatory Care Manager ( ACM) contacted patient for Complex Case Management  follow up. Spoke to patient  Introduced self/role and reason for call. Patient reported:   She had an appointment with Dr Gilberto Peabody on 10/26 but the  said that she needed a mask to be seen. Patient said she knock on the door as the note said, there were 2 people in the waiting room ( they must have been patient.) Yes they had mask on, but the 4 staff members behind the glass did not have mask . She asked one of the ladies to get one of the staff to come to the door as it was locked. The young lady came to the door and  told patient to go downstairs and purchase a mask from the vending machine. Patient said she left and thought about it,came back to say that she is cancelling her appointment because she did not have a mask to come in the office and could not afford to buy one and to let Dr Gilberto Peabody know. One staff member came out called one of the ladies in the waiting room and she did have a mask on. ACM said that may have been an MA or a nurse working with the provider calling the patient to come back to be seen. Pertinent concerns:  Patient felt since it was a doctor office they had mask to give patients. She is fully vaccinated including the COVID Booster. She was not having any cold symptoms. Neither  had masks on. Why insist that the patients wear maskand the staff don't? She saw the temperature meter hanging on a pole seems like masks should have been there for use as well. Patient sent a Enjoyort message to provider as to why she did not keep her appointment. Patient contacted Dr Ronnie Dey office ( GI) but her appointment is [id number].4,[id number]. by that time her insurance would be changed to [facility] and [facility] She asked if they can put her on a wait list ,because she needs to be seen.  Now she have to look around for an GI that takes [facility]    Specialist appointments since last outreach? No   If so, specialist and date: N/A    Next PCP 11/30/2021 @ 9:15 AM    Medications:     New medications since last outreach: No  Does patient need refills on any medications: No   Medication changes since last outreach (dose adjustments or discontinued meds): No      Home Health company: N/A    Barriers to care? N/A        Patient reminded that there are physicians on call 24 hours a day / 7 days a week (M-F 5pm to 8am and from Friday 5pm until Monday 8a for the weekend) should the patient have questions or concerns.

## 2021-11-30 ENCOUNTER — OFFICE VISIT (OUTPATIENT)
Dept: FAMILY MEDICINE CLINIC | Age: 46
End: 2021-11-30
Payer: MEDICARE

## 2021-11-30 VITALS
OXYGEN SATURATION: 100 % | RESPIRATION RATE: 16 BRPM | TEMPERATURE: 98 F | SYSTOLIC BLOOD PRESSURE: 137 MMHG | HEART RATE: 91 BPM | WEIGHT: 253 LBS | DIASTOLIC BLOOD PRESSURE: 86 MMHG | BODY MASS INDEX: 36.22 KG/M2 | HEIGHT: 70 IN

## 2021-11-30 DIAGNOSIS — F17.210 CIGARETTE NICOTINE DEPENDENCE WITHOUT COMPLICATION: ICD-10-CM

## 2021-11-30 DIAGNOSIS — Z00.00 MEDICARE ANNUAL WELLNESS VISIT, SUBSEQUENT: Primary | ICD-10-CM

## 2021-11-30 DIAGNOSIS — I10 ESSENTIAL HYPERTENSION, BENIGN: ICD-10-CM

## 2021-11-30 DIAGNOSIS — K04.7 DENTAL ABSCESS: ICD-10-CM

## 2021-11-30 DIAGNOSIS — K31.84 GASTROPARESIS: ICD-10-CM

## 2021-11-30 DIAGNOSIS — K58.1 IRRITABLE BOWEL SYNDROME WITH CONSTIPATION: ICD-10-CM

## 2021-11-30 DIAGNOSIS — K44.9 HIATAL HERNIA: ICD-10-CM

## 2021-11-30 DIAGNOSIS — K21.9 GASTROESOPHAGEAL REFLUX DISEASE, UNSPECIFIED WHETHER ESOPHAGITIS PRESENT: ICD-10-CM

## 2021-11-30 DIAGNOSIS — Z12.11 SCREENING FOR COLON CANCER: ICD-10-CM

## 2021-11-30 DIAGNOSIS — F33.2 SEVERE EPISODE OF RECURRENT MAJOR DEPRESSIVE DISORDER, WITHOUT PSYCHOTIC FEATURES (HCC): ICD-10-CM

## 2021-11-30 PROCEDURE — G9717 DOC PT DX DEP/BP F/U NT REQ: HCPCS | Performed by: INTERNAL MEDICINE

## 2021-11-30 PROCEDURE — G8417 CALC BMI ABV UP PARAM F/U: HCPCS | Performed by: INTERNAL MEDICINE

## 2021-11-30 PROCEDURE — G8754 DIAS BP LESS 90: HCPCS | Performed by: INTERNAL MEDICINE

## 2021-11-30 PROCEDURE — G8427 DOCREV CUR MEDS BY ELIG CLIN: HCPCS | Performed by: INTERNAL MEDICINE

## 2021-11-30 PROCEDURE — 99214 OFFICE O/P EST MOD 30 MIN: CPT | Performed by: INTERNAL MEDICINE

## 2021-11-30 PROCEDURE — G8752 SYS BP LESS 140: HCPCS | Performed by: INTERNAL MEDICINE

## 2021-11-30 PROCEDURE — G0439 PPPS, SUBSEQ VISIT: HCPCS | Performed by: INTERNAL MEDICINE

## 2021-11-30 RX ORDER — VALSARTAN AND HYDROCHLOROTHIAZIDE 80; 12.5 MG/1; MG/1
1 TABLET, FILM COATED ORAL DAILY
Qty: 90 TABLET | Refills: 1 | Status: SHIPPED | OUTPATIENT
Start: 2021-11-30 | End: 2022-09-22 | Stop reason: ALTCHOICE

## 2021-11-30 RX ORDER — CLINDAMYCIN HYDROCHLORIDE 300 MG/1
300 CAPSULE ORAL 3 TIMES DAILY
Qty: 21 CAPSULE | Refills: 0 | Status: SHIPPED | OUTPATIENT
Start: 2021-11-30 | End: 2021-12-07

## 2021-11-30 RX ORDER — BUPROPION HYDROCHLORIDE 150 MG/1
150 TABLET, EXTENDED RELEASE ORAL 2 TIMES DAILY
Qty: 180 TABLET | Refills: 1 | Status: SHIPPED | OUTPATIENT
Start: 2021-11-30 | End: 2022-06-21 | Stop reason: SINTOL
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
## 2021-11-30 NOTE — PROGRESS NOTES
Patient seen for annual medicare wellness visit with c/o GI issues, need a referral to follow up hernia    1. Have you been to the ER, urgent care clinic since your last visit? Hospitalized since your last visit? Yes Urgent Care for infected tooth    2. Have you seen or consulted any other health care providers outside of the [address] [place] [address] since your last visit? Include any pap smears or colon screening.  No     Health Maintenance Due   Topic Date Due    Hepatitis C Screening  Never done    Pneumococcal 0-64 years (1 of 2 - PPSV23) Never done    DTaP/Tdap/Td series (1 - Tdap) Never done    A1C test (Diabetic or Prediabetic)  05/09/2018    Colorectal Cancer Screening Combo  Never done    Medicare Yearly Exam  11/04/2020    Flu Vaccine (1) Never done     Patient declined flu vaccine

## 2021-11-30 NOTE — PATIENT INSTRUCTIONS
Medicare Wellness Visit, Female     The best way to live healthy is to have a lifestyle where you eat a well-balanced diet, exercise regularly, limit alcohol use, and quit all forms of tobacco/nicotine, if applicable. Regular preventive services are another way to keep healthy. Preventive services (vaccines, screening tests, monitoring & exams) can help personalize your care plan, which helps you manage your own care. Screening tests can find health problems at the earliest stages, when they are easiest to treat. Karen follows the current, evidence-based guidelines published by the [facility] ([facility]) when recommending preventive services for our patients. Because we follow these guidelines, sometimes recommendations change over time as research supports it. (For example, mammograms used to be recommended annually. Even though Medicare will still pay for an annual mammogram, the newer guidelines recommend a mammogram every two years for women of average risk). Of course, you and your doctor may decide to screen more often for some diseases, based on your risk and your co-morbidities (chronic disease you are already diagnosed with). Preventive services for you include:  - Medicare offers their members a free annual wellness visit, which is time for you and your primary care provider to discuss and plan for your preventive service needs. Take advantage of this benefit every year!  -All adults over the age of 72 should receive the recommended pneumonia vaccines. Current USPSTF guidelines recommend a series of two vaccines for the best pneumonia protection.   -All adults should have a flu vaccine yearly and a tetanus vaccine every 10 years.   -All adults age 48 and older should receive the shingles vaccines (series of two vaccines).       -All adults age 38-68 who are overweight should have a diabetes screening test once every three years.   -All adults born between [address] and 1966 should be screened once for Hepatitis C.  -Other screening tests and preventive services for persons with diabetes include: an eye exam to screen for diabetic retinopathy, a kidney function test, a foot exam, and stricter control over your cholesterol.   -Cardiovascular screening for adults with routine risk involves an electrocardiogram (ECG) at intervals determined by your doctor.   -Colorectal cancer screenings should be done for adults age 54-65 with no increased risk factors for colorectal cancer. There are a number of acceptable methods of screening for this type of cancer. Each test has its own benefits and drawbacks. Discuss with your doctor what is most appropriate for you during your annual wellness visit. The different tests include: colonoscopy (considered the best screening method), a fecal occult blood test, a fecal DNA test, and sigmoidoscopy.    -A bone mass density test is recommended when a woman turns 65 to screen for osteoporosis. This test is only recommended one time, as a screening. Some providers will use this same test as a disease monitoring tool if you already have osteoporosis. -Breast cancer screenings are recommended every other year for women of normal risk, age 54-69.  -Cervical cancer screenings for women over age 72 are only recommended with certain risk factors.      Here is a list of your current Health Maintenance items (your personalized list of preventive services) with a due date:  Health Maintenance Due   Topic Date Due    Hepatitis C Test  Never done    COVID-19 Vaccine (1) Never done    Pneumococcal Vaccine (1 of 2 - PPSV23) Never done    DTaP/Tdap/Td  (1 - Tdap) Never done    Hemoglobin A1C    05/09/2018    Colorectal Screening  Never done    Annual Well Visit  11/04/2020    Yearly Flu Vaccine (1) Never done            Irritable Bowel Syndrome: Care Instructions  Your Care Instructions  Irritable bowel syndrome, or IBS, is a problem with the intestines that causes belly pain, bloating, gas, constipation, and diarrhea. The cause of IBS is not well known. IBS can last for many years, but it does not get worse over time or lead to serious disease. Most people can control their symptoms by changing their diet and reducing stress. Follow-up care is a key part of your treatment and safety. Be sure to make and go to all appointments, and call your doctor if you are having problems. It's also a good idea to know your test results and keep a list of the medicines you take. How can you care for yourself at home? · Prevent diarrhea:  ? Limit the amount of high-fiber foods you eat. This includes vegetables, fruits, whole-grain breads and pasta, high-fiber cereal, and brown rice. ? Limit dairy products. ? Limit artificial sweeteners such as sorbitol and xylitol. · Avoid constipation:  ? Include fruits, vegetables, beans, and whole grains in your diet each day. These foods are high in fiber. ? Drink plenty of fluids. If you have kidney, heart, or liver disease and have to limit fluids, talk with your doctor before you increase the amount of fluids you drink. ? Get some exercise every day. Build up slowly to 30 to 60 minutes a day on 5 or more days of the week. ? Take a fiber supplement, such as Citrucel or Metamucil, every day if needed. Read and follow all instructions on the label. ? Schedule time each day for a bowel movement. Having a daily routine may help. Take your time and do not strain when having a bowel movement. · To help relieve bloating or gas, avoid foods such as beans, cabbage, cauliflower, or broccoli. · Keep a daily diary of what you eat and what symptoms you have. This may help find foods that cause you problems. · Eat slowly. Try to make mealtime relaxing. · Find ways to reduce stress. When should you call for help?    Call your doctor now or seek immediate medical care if:    · Your pain is different than usual or occurs with fever.     · You lose weight without trying, or you lose your appetite and you do not know why.     · Your symptoms often wake you from sleep.     · Your stools are black and tarlike or have streaks of blood. Watch closely for changes in your health, and be sure to contact your doctor if:    · Your IBS symptoms get worse or begin to disrupt your day-to-day life.     · You become more tired than usual.     · Your home treatment stops working. Where can you learn more? Go to http://www.gray.com/  Enter Y447 in the search box to learn more about \"Irritable Bowel Syndrome: Care Instructions. \"  Current as of: February 10, 2021               Content Version: 13.0  © 2006-2021 [employer]. Care instructions adapted under license by [employer] (which disclaims liability or warranty for this information). If you have questions about a medical condition or this instruction, always ask your healthcare professional. [place] [address] any warranty or liability for your use of this information.

## 2021-12-01 ENCOUNTER — PATIENT OUTREACH (OUTPATIENT)
Dept: CASE MANAGEMENT | Age: 46
End: 2021-12-01

## 2021-12-01 NOTE — PROGRESS NOTES
.Date/Time:  12/1/2021 3:20 PM    Method of communication with patient:phone    [facility] ( [facility]) made out reach to  patient by telephone for  Complex Case Management  ( CCM). Provided introduction to self, and explanation of the Ambulatory Care . Contact at [phone] anytime Monday thru Friday 08:00-4:30. [facility] noted patient did keep follow up with PCP with return visit in 3 months referrals to GI Noted as well. Started on New medication.

## 2022-03-01 ENCOUNTER — OFFICE VISIT (OUTPATIENT)
Dept: FAMILY MEDICINE CLINIC | Age: 47
End: 2022-03-01
Payer: MEDICARE

## 2022-03-01 ENCOUNTER — TELEPHONE (OUTPATIENT)
Dept: FAMILY MEDICINE CLINIC | Age: 47
End: 2022-03-01

## 2022-03-01 VITALS
RESPIRATION RATE: 16 BRPM | HEIGHT: 70 IN | DIASTOLIC BLOOD PRESSURE: 80 MMHG | OXYGEN SATURATION: 99 % | BODY MASS INDEX: 36.33 KG/M2 | WEIGHT: 253.8 LBS | SYSTOLIC BLOOD PRESSURE: 124 MMHG | TEMPERATURE: 98.6 F | HEART RATE: 82 BPM

## 2022-03-01 DIAGNOSIS — Z86.010 HISTORY OF COLON POLYPS: ICD-10-CM

## 2022-03-01 DIAGNOSIS — K58.1 IRRITABLE BOWEL SYNDROME WITH CONSTIPATION: ICD-10-CM

## 2022-03-01 DIAGNOSIS — F33.2 SEVERE EPISODE OF RECURRENT MAJOR DEPRESSIVE DISORDER, WITHOUT PSYCHOTIC FEATURES (HCC): ICD-10-CM

## 2022-03-01 DIAGNOSIS — M54.9 ACUTE MID BACK PAIN: Primary | ICD-10-CM

## 2022-03-01 DIAGNOSIS — K44.9 HIATAL HERNIA: ICD-10-CM

## 2022-03-01 DIAGNOSIS — R73.01 IMPAIRED FASTING GLUCOSE: ICD-10-CM

## 2022-03-01 DIAGNOSIS — I10 ESSENTIAL HYPERTENSION, BENIGN: ICD-10-CM

## 2022-03-01 DIAGNOSIS — K21.9 GASTROESOPHAGEAL REFLUX DISEASE, UNSPECIFIED WHETHER ESOPHAGITIS PRESENT: ICD-10-CM

## 2022-03-01 DIAGNOSIS — E66.01 SEVERE OBESITY (BMI 35.0-39.9) WITH COMORBIDITY (HCC): ICD-10-CM

## 2022-03-01 DIAGNOSIS — K31.84 GASTROPARESIS: ICD-10-CM

## 2022-03-01 LAB — HBA1C MFR BLD HPLC: 5.5 %

## 2022-03-01 PROCEDURE — G8752 SYS BP LESS 140: HCPCS | Performed by: INTERNAL MEDICINE

## 2022-03-01 PROCEDURE — 99214 OFFICE O/P EST MOD 30 MIN: CPT | Performed by: INTERNAL MEDICINE

## 2022-03-01 PROCEDURE — G8417 CALC BMI ABV UP PARAM F/U: HCPCS | Performed by: INTERNAL MEDICINE

## 2022-03-01 PROCEDURE — 83036 HEMOGLOBIN GLYCOSYLATED A1C: CPT | Performed by: INTERNAL MEDICINE

## 2022-03-01 PROCEDURE — G8754 DIAS BP LESS 90: HCPCS | Performed by: INTERNAL MEDICINE

## 2022-03-01 PROCEDURE — G9717 DOC PT DX DEP/BP F/U NT REQ: HCPCS | Performed by: INTERNAL MEDICINE

## 2022-03-01 PROCEDURE — G8427 DOCREV CUR MEDS BY ELIG CLIN: HCPCS | Performed by: INTERNAL MEDICINE

## 2022-03-01 RX ORDER — IBUPROFEN 800 MG/1
800 TABLET ORAL
Qty: 90 TABLET | Refills: 1 | Status: SHIPPED | OUTPATIENT
Start: 2022-03-01 | End: 2022-03-09 | Stop reason: SDUPTHER

## 2022-03-01 NOTE — PROGRESS NOTES
Patient seen for routine follow up   1. \"Have you been to the ER, urgent care clinic since your last visit? Hospitalized since your last visit? \" No    2. \"Have you seen or consulted any other health care providers outside of the [address] [place] [address] since your last visit? \" No     3. For patients aged 39-70: Has the patient had a colonoscopy / FIT/ Cologuard? Yes - Care Gap present. Most recent result on file      If the patient is female:    4. For patients aged 41-77: Has the patient had a mammogram within the past 2 years? Yes - no Care Gap present      5. For patients aged 21-65: Has the patient had a pap smear? Yes - no Care Gap present      Health Maintenance Due   Topic Date Due    Hepatitis C Screening  Never done    Pneumococcal 0-64 years (1 of 2 - PPSV23) Never done    DTaP/Tdap/Td series (1 - Tdap) Never done    A1C test (Diabetic or Prediabetic)  05/09/2018    Colorectal Cancer Screening Combo  Never done    Flu Vaccine (1) Never done     Patient needs referral to GI for colonoscopy, last referral did not accept new insurance.   Patient declined flu vaccine

## 2022-03-01 NOTE — PATIENT INSTRUCTIONS
Learning About Relief for Back Pain  What is back strain? Back strain is an injury that happens when you overstretch, or pull, a muscle in your back. You may hurt your back in an accident or when you exercise or lift something. Most back pain gets better with rest and time. You can take care of yourself at home to help your back heal.  What can you do first to relieve back pain? When you first feel back pain, try these steps:  · Walk. Take a short walk (10 to 20 minutes) on a level surface (no slopes, hills, or stairs) every 2 to 3 hours. Walk only distances you can manage without pain, especially leg pain. · Relax. Find a comfortable position for rest. Some people are comfortable on the floor or a medium-firm bed with a small pillow under their head and another under their knees. Some people prefer to lie on their side with a pillow between their knees. Don't stay in one position for too long. · Try heat or ice. Try using a heating pad on a low or medium setting, or take a warm shower, for 15 to 20 minutes every 2 to 3 hours. Or you can buy single-use heat wraps that last up to 8 hours. You can also try an ice pack for 10 to 15 minutes every 2 to 3 hours. You can use an ice pack or a bag of frozen vegetables wrapped in a thin towel. There is not strong evidence that either heat or ice will help, but you can try them to see if they help. You may also want to try switching between heat and cold. · Take pain medicine exactly as directed. ? If the doctor gave you a prescription medicine for pain, take it as prescribed. ? If you are not taking a prescription pain medicine, ask your doctor if you can take an over-the-counter medicine. What else can you do? · Stretch and exercise. Exercises that increase flexibility may relieve your pain and make it easier for your muscles to keep your spine in a good, neutral position. And don't forget to keep walking. · Do self-massage.  You can use self-massage to unwind after work or school or to energize yourself in the morning. You can easily massage your feet, hands, or neck. Self-massage works best if you are in comfortable clothes and are sitting or lying in a comfortable position. Use oil or lotion to massage bare skin. · Reduce stress. Back pain can lead to a vicious Belk[place]ski: Distress about the pain tenses the muscles in your back, which in turn causes more pain. Learn how to relax your mind and your muscles to lower your stress. Where can you learn more? Go to http://www.gray.com/  Enter Q517 in the search box to learn more about \"Learning About Relief for Back Pain. \"  Current as of: July 1, 2021               Content Version: 13.0  © 2006-2021 [employer]. Care instructions adapted under license by [employer] (which disclaims liability or warranty for this information). If you have questions about a medical condition or this instruction, always ask your healthcare professional. [address] any warranty or liability for your use of this information.

## 2022-03-01 NOTE — TELEPHONE ENCOUNTER
Patient called and stated that prescriptions sen this morning were sent to the incorrect pharmacy. Changed pharmacy in chart to correct one. Patient also states she requested a prescription for Ibuprofen 800 mg and did not receive it.
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
## 2022-03-09 DIAGNOSIS — M54.9 ACUTE MID BACK PAIN: ICD-10-CM

## 2022-03-09 RX ORDER — IBUPROFEN 800 MG/1
800 TABLET ORAL
Qty: 90 TABLET | Refills: 1 | Status: SHIPPED | OUTPATIENT
Start: 2022-03-09

## 2022-03-18 PROBLEM — F63.3 TRICHOTILLOMANIA: Status: ACTIVE | Noted: 2017-10-29

## 2022-03-19 PROBLEM — E66.01 SEVERE OBESITY (BMI 35.0-39.9) WITH COMORBIDITY (HCC): Status: ACTIVE | Noted: 2018-05-03

## 2022-04-05 ENCOUNTER — PATIENT OUTREACH (OUTPATIENT)
Dept: CASE MANAGEMENT | Age: 47
End: 2022-04-05

## 2022-04-05 ENCOUNTER — HOSPITAL ENCOUNTER (OUTPATIENT)
Dept: PHYSICAL THERAPY | Age: 47
Discharge: HOME | End: 2022-04-05
Payer: MEDICARE

## 2022-04-05 DIAGNOSIS — G47.00 INSOMNIA, UNSPECIFIED TYPE: Primary | ICD-10-CM

## 2022-04-05 PROCEDURE — 97530 THERAPEUTIC ACTIVITIES: CPT

## 2022-04-05 PROCEDURE — 97162 PT EVAL MOD COMPLEX 30 MIN: CPT

## 2022-04-05 RX ORDER — TRAZODONE HYDROCHLORIDE 50 MG/1
50 TABLET ORAL
Qty: 90 TABLET | Refills: 1 | Status: SHIPPED | OUTPATIENT
Start: 2022-04-05 | End: 2022-06-21

## 2022-04-05 NOTE — PROGRESS NOTES
[facility]   [address] [facility] [address] [facility], [place] [address]  Phone: [phone] Fax: [phone] / [address] [place] [address] PHYSICAL THERAPY SERVICES  Patient Name: Tracy Giordano : 1975   Medical   Diagnosis: Upper back pain [M54.9]  Other low back pain [M54.59] Treatment Diagnosis: Mid and low back pain   Onset Date: chronic     Referral Source: Whitney Elena MD Start of Critical access hospital): 2022   Prior Hospitalization: See medical history Provider #: [id number]   Prior Level of Function: Able to manage S&S, indep w/ all functional tasks   Comorbidities: difficulty sleeping, lifting, carrying groceries, standing > 10 min, ascending/descending steps, walking > 1 mile, performing house hold chores, difficulty w/ deep breathing   Medications: Verified on Patient Summary List   The Plan of Care and following information is based on the information from the initial evaluation.   ========================================================================  Assessment / key information:  Pt is a 52 yo female that presents to PT today w/ chief complaint of back pain (mid>lower back pain) that is chronic in nature and has worsened over the last year after she had COVID. Pt S&S are consistent w/ OA however it is also noted that pt has difficulty w/ deep breathing. She does have a h/o smoking and has been working on cessation, but cont to smoke 2-3 cigarettes/day. At this time, there are no red flags; pt denies any numbness/tingling into the BLEs, saddle anesthesia, unexplained weight loss/gain, pain unrelieved by changes in position, or bowel/bladder changes.  She has decreased l/s and t/s AROM w/ pain noted on ipsilateral sides of movement, decreased posterior BLE chain flexibility and strength, decreased periscapular strength, 1/2 inch rib excursion during breathing, decreased breathing ratio of 1:2, decreased postural awareness, and decreased tolerance to functional activity as indicated by a FOTO score of 43/100. Pt would benefit from skilled PT to improve overall functional mobility to allow for tolerance to all PLOF tasks.   ========================================================================  Eval Complexity: History: HIGH Complexity :3+ comorbidities / personal factors will impact the outcome/ POC Exam:HIGH Complexity : 4+ Standardized tests and measures addressing body structure, function, activity limitation and / or participation in recreation  Presentation: MEDIUM Complexity : Evolving with changing characteristics  Clinical Decision Making:MEDIUM Complexity : FOTO score of 26-74Overall Complexity:MEDIUM  Problem List: pain affecting function, decrease ROM, decrease strength, impaired gait/ balance, decrease ADL/ functional abilitiies, decrease activity tolerance, decrease flexibility/ joint mobility and decrease transfer abilities   Treatment Plan may include any combination of the following: Therapeutic exercise, Therapeutic activities, Neuromuscular re-education, Physical agent/modality, Gait/balance training, Manual therapy, Patient education, Self Care training, Functional mobility training, Home safety training and Stair training  Patient / Family readiness to learn indicated by: asking questions, trying to perform skills and interest  Persons(s) to be included in education: patient (P)  Barriers to Learning/Limitations: yes;  other chronic pain  Measures taken: OBJECTIVE  Posture:  Lateral Shift:    []? R    [x]? L      []? +  []? -  Kyphosis:        [x]? Increased   []? Decreased   []? WNL  Lordosis:         [x]? Increased; reverses w/ fwd trunk flex  Pelvic symmetry: []? WNL       [x]? Other: slight left elevation, anterior pelvic tilt noted bilaterally  Gait: fwd flexed posture, pt stands w/ trunk flex at 15 d, antalgic gait     Active Movements: []? N/A   []? Too acute   []?  Other:  ROM % AROM % PROM Comments:pain, area   Forward flexion 40-60 Finger to toes       Extension 20-30 neutral   Pain in the l/s   SB right 20-30 Joint line   Ipsilateral pain   SB left 20-30 Joint line   Ipsilateral pain   Rotation right 5-10 Limited 75%   Ipsilateral pain   Rotation left 5-10 Limited 75%   Ipsilateral pain      T/s AROM: limited into ext, SB bilaterally, and rotation all moderately w/ ipsilateral pain  UE AROM: BUE full and pain-free  C/s AROM: WNL  Rib excursion: rest: 44 in and w/ inhale, excursion of 1/2 inch noted  Breathin sec breath in to 2 sec out     Neuro Screen [x]? WNL  BLE DTR: achilles, patellofemoral: hyporeflexive BLE  BUE  DTR: rachioradialis, tricep, bicep tendon: hyporeflexive BLE  Dermatomes: intact BUE/LE  Wrist/ankle clonus: absent/1 beat BLE  Yanez's BUE: absent  Myotomes: BUE/BLE WNL     Dural Mobility:  SLR Sitting:     [x]? R    [x]? L    []? +    [x]? -  @ (degrees):           Supine:    [x]? R    [x]? L    []? +    [x]? -  @ (degrees):   Prone Knee Bend:      [x]? R    [x]? L    []? +    [x]? -   + PT unable to recreate pt reports of \"burning sensation\" in the small of back     Palpation  Increased muscular tone noted in the spinal paravertebral muscles bilaterally, full length of the spine, proximal gluteals/specifically glute med and piriformis     Strength  BUE: grossly 4+/5 w/ exception of rhomboids at 4-/5  BLE: grossly 4+/5 w/ exception of hip ext R: 3/5, L: 3+/5     Special Tests  Lumbar:                                    Quadrant:                    [x]? Pos  []? Neg   []? Flex  [x]? Ext  T/s: compression: negative         Distraction: negative  Sacroilliac:                               Leg Length:     []? +    [x]? -   Position: supine, hook-lying                                              Crests: slightly elevated on the L     Hip:     Brianna Card:              [x]? R    [x]? L    []? +    [x]? -               Piriformis:        [x]? R    [x]?  L    [x]? +    []? -               AROM: WNL BLE     Deficits:                        Hamstrings 90/90: minimal restriction              Piriformis: moderate restriction bilaterally     Other tests/comments:              FOTO: 43/100              BP: L UE, brachial artery, manual cuff: 150/88 mmHg              Bridge: limited 50%              Transitional movement:                          Sit<>stand: hands required on surface, hands to thighs to assume upright                          Sit<>supine: mod I, increased time and guarded movement noted                          Supine to prone: increased time and guarded movement noted  Joint mobility: Hypomobility of PA glides, rib glides throughout t/s Bilaterally      Patient Goal (s):  \" lying down comfortable sitting up without bending over, standing without burning sensation\"   Patient self reported health status: fair  Rehabilitation Potential: good   Short Term Goals: To be accomplished in  2  weeks:  1. Pt will be compliant w/ HEP 5/7 days a week to manage S&S. Status at last assessment issued HEP  2.   Pt will demo a 2:4 ration of inhale/exhale breathing pattern in order to improve rib excursion and allow greater rib mobility/less pain w/ functional movement. Status at last assessment 1:2   Long Term Goals: To be accomplished in  6  weeks:  1. Pt will have improved l/s extension AROM to 15 deg to allow for more erect posture and reduced l/s strain during ambulation/standing tasks. Status at last assessment neutral: exhibits 15 d trunk flex in standing  2. Pt will have improved bridge ability to full range in order to allow for greater ease of transitions in bed. Status at last assessment limited 50%  3. Pt will have improved FOTO score to predicted value of 57/100 in order to indicate improved tolerance to functional activity. Status at last assessment 43/100  4.  Pt will have increased rib excursion to 1.5 inches in order to indicate better ability to take deep breaths, to allow for greater air flow and decreased effort of breathing w/ functional activity. Status at last assessment 1/2 inch  Frequency / Duration:   Patient to be seen  2  times per week for 12  treatments:    Medicare Patients only : Patient would benefit from skilled PT 2 times per week for 24-36 sessions as needed in this certification period. Goals will be assigned and reassessed every 10 visits/ 30 days per Medicare guidelines   Patient / Caregiver education and instruction: self care, activity modification and exercises  Therapist Signature: Juan Ramon Gamez PT Date: 1/1/0370   Certification Period: 4/5/22 to 7/5/22 Time: 1055 am   ========================================================================  I certify that the above Physical Therapy Services are being furnished while the patient is under my care. I agree with the treatment plan and certify that this therapy is necessary. Physician Signature:        Date:       Time: ___                                            Lesa Hernandez MD.    Please sign and return to [facility] or you may fax the signed copy to [phone]. Thank you.

## 2022-04-05 NOTE — PROGRESS NOTES
.Date/Time:  4/5/2022 10:38 AM    Method of communication with patient:phone, none    [address] ( [facility]) made out reach to  patient by telephone to offer Complex Case Management  ( CCM). Provided introduction to self, and explanation of the Ambulatory Care . Contact at [phone] anytime Monday thru Friday 08:00-4:30.

## 2022-04-05 NOTE — PROGRESS NOTES
PT DAILY TREATMENT NOTE/LUMBAR EVAL     Patient Name: Abbey Troy  Date:2022  : 1975  [x]  Patient  Verified  Payor: Mendoza Cuello / Plan: [address] [place] [address] HMO / Product Type: Managed Care Medicare /    In time: 69  Out time:1055  Total Treatment Time (min): 47  Visit #: 1 of 12    Medicare/BCBS Only   Total Timed Codes (min): 25 1:1 Treatment Time:  47     Treatment Area: Upper back pain [M54.9]  Other low back pain [M54.59]  SUBJECTIVE  Pain Level (0-10 scale): C: 8/10, B: 7/10, W: 10/10 \"burning in the l/s but unable to describe the upper part\"  [x]constant []intermittent []improving []worsening []no change since onset    Any medication changes, allergies to medications, adverse drug reactions, diagnosis change, or new procedure performed?: [x] No    [] Yes (see summary sheet for update)  Subjective functional status/changes:     PLOF:  Able to manage S&S  Limitations to PLOF: difficulty sleeping, lifting, carrying groceries, standing > 10 min, ascending/descending steps, walking > 1 mile, performing house hold chores  Mechanism of Injury: chronic, noted difficulty w/ more chest pain since COVID in 2021, still has loss of smell, no O2 or hospitalization  Current symptoms/Complaints: Pt is a 50 yo female that presents to PT w/ chief complaint of back pain that started in  after falling down a flight of steps after her knee gave out. Pt reported that she saw her MD re: back pain at that time and was able to manage it indep. Over the years, her back pain has progressively increased. In 2021, she noted that she got COVID and back pain and mid back pain increased. S&S reduce w/ fwd trunk flex.    Previous Treatment/Compliance: acupuncture, no formal therapy or injections  PMHx/Surgical Hx: Irritable bowel syndrome, GERD, chronic back pain, smoke history 1ppd and now 2-3 cigarettes/day, HTN, OA, depression, h/o constipation  Work Hx: disability  Living Situation: Pt reports that she lives w/ her mother who is 72 yo and healthy. Pt Goals: \" lying down comfortable sitting up without bending over, standing without burning sensation\"  Barriers: [x]pain   Motivation: good  Substance use: [x]Alcohol [x]Tobacco       OBJECTIVE/EXAMINATION    22 min [x]Eval                  []Re-Eval     25 min Therapeutic Activity:  []  See flow sheet : review of FOTO   Rationale: educate pt on HEP, dx, prognosis  to improve the patients ability to manage S&S and improve tolerance to functional activities at home        With   [] TE   [x] TA   [] neuro   [] other: Patient Education: [x] Review HEP  , Pt educated on diagnosis and prognosis of their injury, current impairments, postural re-education, goals/role/intent of therapy and importance of compliance with HEP. Written HEP provided to patient and PT instructed pt on performance. Pt demo good understanding and recall. PT addressed pt questions/concerns regarding their condition. Discussed the following activity modifications:  - Avoid performing provocative movements and activities. - Activity modification:pt to perform exercises per HEP to tolerance, no increased pain  Post tx, pt verbalized understanding and demo ability to complete HEP without any questions or concerns for PT. Other Objective/Functional Measures:     Physical Therapy Evaluation - Lumbar Spine ([facility])    General Health:  Red Flags Indicated? [] Yes    [x] No  [] Yes [x] No Recent weight change (If yes, due to dieting?  [] Yes  [] No)   [] Yes [x] No Weakness in legs during walking  [] Yes [x] No Unremitting pain at night  [] Yes [x] No Abdominal pain or problems  [] Yes [x] No Rectal bleeding (no changes other than those associated w/ constipation history)  [] Yes [x] No Feet more cold or painful in cold weather  [] Yes [x] No Menstrual irregularities (has had several US in the last 3 years 2/2 irregularities, last period was in March)  [] Yes [x] No Blood or pain with urination  [] Yes [x] No Dysfunction of bowel or bladder  [] Yes [x] No Recent illness within past 3 weeks (i.e, cold, flu)  [] Yes [x] No Numbness/tingling in buttock/genitalia region    Past History/Treatments:     Diagnostic Tests:  [x] X-rays  [x] MRI       Results: OA of the spine per pt.  Pt also notes having a cyst near the small of back that is being monitored, no recommendation for sx    Functional Status  What position do you sleep in?: prefers to sleep on the R side, currently unable to sleep > 1 hour     OBJECTIVE  Posture:  Lateral Shift: [] R    [x] L     [] +  [] -  Kyphosis: [x] Increased [] Decreased   []  WNL  Lordosis:  [x] Increased; reverses w/ fwd trunk flex  Pelvic symmetry: [] WNL    [x] Other: slight left elevation, anterior pelvic tilt noted bilaterally  Gait: fwd flexed posture, pt stands w/ trunk flex at 15 d, antalgic gait    Active Movements: [] N/A   [] Too acute   [] Other:  ROM % AROM % PROM Comments:pain, area   Forward flexion 40-60 Finger to toes     Extension 20-30 neutral  Pain in the l/s   SB right 20-30 Joint line  Ipsilateral pain   SB left 20-30 Joint line  Ipsilateral pain   Rotation right 5-10 Limited 75%  Ipsilateral pain   Rotation left 5-10 Limited 75%  Ipsilateral pain     T/s AROM: limited into ext, SB bilaterally, and rotation all moderately w/ ipsilateral pain  UE AROM: BUE full and pain-free  C/s AROM: WNL  Rib excursion: rest: 44 in and w/ inhale, excursion of 1/2 inch noted  Breathin sec breath in to 2 sec out    Neuro Screen [x] WNL  BLE DTR: achilles, patellofemoral: hyporeflexive BLE  BUE  DTR: rachioradialis, tricep, bicep tendon: hyporeflexive BLE  Dermatomes: intact BUE/LE  Wrist/ankle clonus: absent/1 beat BLE  Yanez's BUE: absent  Myotomes: BUE/BLE WNL    Dural Mobility:  SLR Sitting: [x] R    [x] L    [] +    [x] -  @ (degrees):           Supine: [x] R    [x] L    [] +    [x] -  @ (degrees):   Prone Knee Bend: [x] R    [x] L    [] +    [x] -   + PT unable to recreate pt reports of \"burning sensation\" in the small of back    Palpation  Increased muscular tone noted in the spinal paravertebral muscles bilaterally, full length of the spine, proximal gluteals/specifically glute med and piriformis    Strength  BUE: grossly 4+/5 w/ exception of rhomboids at 4-/5  BLE: grossly 4+/5 w/ exception of hip ext R: 3/5, L: 3+/5    Special Tests  Lumbar:      Quadrant:  [x] Pos  [] Neg   [] Flex  [x] Ext  T/s: compression: negative         Distraction: negative  [place]:      Leg Length: [] +    [x] -   Position: supine, hook-lying      Crests: slightly elevated on the L    Hip: Dennice Saucer:  [x] R    [x] L    [] +    [x] -    Piriformis: [x] R    [x] L    [x] +    [] -    AROM: WNL BLE    Deficits:    Hamstrings 90/90: minimal restriction   Piriformis: moderate restriction bilaterally    Other tests/comments:   FOTO: 43/100   BP: L UE, brachial artery, manual cuff: 150/88 mmHg   Bridge: limited 50%   Transitional movement:    Sit<>stand: hands required on surface, hands to thighs to assume upright    Sit<>supine: mod I, increased time and guarded movement noted    Supine to prone: increased time and guarded movement noted    Pain Level (0-10 scale) post treatment: 7-8/10, no radicular S&S    ASSESSMENT/Changes in Function:   Pt is a 52 yo female that presents to PT today w/ chief complaint of back pain (mid>lower back pain) that is chronic in nature and has worsened over the last year after she had COVID. Pt S&S are consistent w/ OA however it is also noted that pt has difficulty w/ deep breathing. She does have a h/o smoking and has been working on cessation, but cont to smoke 2-3 cigarettes/day. At this time, there are no red flags; pt denies any numbness/tingling into the BLEs, saddle anesthesia, unexplained weight loss/gain, pain unrelieved by changes in position, or bowel/bladder changes.  She has decreased l/s and t/s AROM w/ pain noted on ipsilateral sides of movement, decreased posterior BLE chain flexibility and strength, decreased periscapular strength, 1/2 inch rib excursion during breathing, decreased breathing ratio of 1:2, decreased postural awareness, and decreased tolerance to functional activity as indicated by a FOTO score of 43/100. Pt would benefit from skilled PT to improve overall functional mobility to allow for tolerance to all PLOF tasks. Patient will continue to benefit from skilled PT services to modify and progress therapeutic interventions, address functional mobility deficits, address ROM deficits, address strength deficits, analyze and address soft tissue restrictions, analyze and cue movement patterns, analyze and modify body mechanics/ergonomics, assess and modify postural abnormalities, address imbalance/dizziness and instruct in home and community integration to attain remaining goals.      [x]  See Plan of Care  []  See progress note/recertification  []  See Discharge Summary         Progress towards goals / Updated goals:  Please refer to POC    Justification for Eval Code Complexity:  Patient History : see POC  Examination see exam   Clinical Presentation: evolving  Clinical Decision Making : FOTO : 43 /100    PLAN  [x]  Upgrade activities as tolerated     [x]  Continue plan of care  []  Update interventions per flow sheet       []  Discharge due to:_  [x]  Other: POC 2x/wk x 6 wks     [facility] 4/5/2022  1055 AM

## 2022-04-05 NOTE — PROGRESS NOTES
Complex Case Management      Date/Time:  2022 12:32 PM    Method of communication with patient:phone  Patient returning ACM call from earlier. Verified name and  with patient as identifiers. Provided introduction to self, and explanation of the Ambulatory Care Manager's role. Reviewed most recent clinic visit w/ patient who verbalized understanding. Patient given an opportunity to ask questions. Patient verbalizing she was in physical therapy. The therapist did a breathing test on her and said she found people that had COVID has this decline in breathing. Patient tried to explain it to [employer] , but she said the PT is going to send Dr Kong Gamble her results with the plan of care for her. Patient voicing she has been up over 48 hours as she has insomnia. Dr Kong Gamble was giving her Trazodone at one time but stop. She was going to Dr Rafita Braswell , he only wanted to talk 5 minutes and write prescriptions. She have not found another one. Dr Kong Gamble is prescribing Wellbutrin to help me with smoking and it helps with her depression, but she really needs something to help her sleep. Top Challenges reviewed with the patient   1. Patient voicing she needs something to help her sleep ( She had bad insomnia) >>> requesting a restart of the Trazodone ( 50 mg)>>> AC will route message to PCP  Next f/u 2022  2. Patient voicing concern from [facility] [address] she still has no smell and the PT said her breathing has been effective as this maybe why her upper back and mid sternal chest hurts.>>> The will send Dr Kong Gamble the notes with plan of care     The patient agrees to contact the PCP office or the [address] [place] [address] for questions related to their healthcare. [facility] provided contact information for future reference. Disease Specific:   N/A    Home Health Active: No    DME Active: No    Barriers to care?  depression, lack of knowledge about disease    Advance Care Planning:   Does patient have an Advance Directive:  not on file     Medication(s):   Medication reconciliation was performed with patient, who verbalizes understanding of administration of home medications. There were no barriers to obtaining medications identified at this time. Referral to Pharm D needed: no     Current Outpatient Medications   Medication Sig    ibuprofen (MOTRIN) 800 mg tablet Take 1 Tablet by mouth every eight (8) hours as needed for Pain.  linaCLOtide (Linzess) 145 mcg cap capsule Take 1 Capsule by mouth Daily (before breakfast).  valsartan-hydroCHLOROthiazide (DIOVAN-HCT) 80-12.5 mg per tablet Take 1 Tablet by mouth daily.  buPROPion SR (WELLBUTRIN SR) 150 mg SR tablet Take 1 Tablet by mouth two (2) times a day. No current facility-administered medications for this visit. [facility] follow up appointment(s):   Future Appointments   Date Time Provider Jina [place]   4/7/2022  7:45 AM Debora Blush [facility] [address]   4/11/2022 11:30 AM Debora Blush [facility] [address]   4/15/2022  7:15 AM Debora Blush [facility] [address]   4/18/2022  9:15 AM Olga Lidia Marlow, PT [facility] [address]   4/20/2022  7:00 AM Olga Lidia Marlow, PT [facility] [address]   4/26/2022  9:15 AM [address] PT [facility] 2 [facility] [address]   4/29/2022  7:15 AM Debora Blush [facility] [address]   5/3/2022  9:15 AM [address] PT [facility] 2 [facility] [address]   5/5/2022  9:15 AM [address] PT [facility] 2 [facility] [address]   5/10/2022  9:15 AM [address] PT [facility] 2 [facility] [address]   5/12/2022  9:15 AM [address] PT [facility] 2 [facility] [address]   6/1/2022  8:45 AM Eliana Tanner MD AMA [facility]        Non-[facility] follow up appointment(s): No    Goals        Chronic Disease     Coordinate pain management plan for patient. Patient will attend Physical therapy as ordered for upper back pains; First secession 4/5/2022        Develops appropriate coping skills. Insomnia   Patient voicing she can't sleep stays up sometimes 48 hours at a time.  ACM will share with PCP as patient was on Trazodone 50 mg in the past.       Relieve psychological distress and prepare patient/family for challenges with disease process.       Patient has Severe episodes of Major depression Declining to go back to Dr Jyoti Cisneros  Wellbutrin   150 mg for her smoking is helping         General     Follows nutrition recommendations and maintains goal weight (small/ frequent meals)      Self-schedules and keeps appointments       Takes all medications as ordered
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
## 2022-04-07 ENCOUNTER — HOSPITAL ENCOUNTER (OUTPATIENT)
Dept: PHYSICAL THERAPY | Age: 47
Discharge: HOME | End: 2022-04-07
Payer: MEDICARE

## 2022-04-07 PROCEDURE — 97112 NEUROMUSCULAR REEDUCATION: CPT

## 2022-04-07 PROCEDURE — 97110 THERAPEUTIC EXERCISES: CPT

## 2022-04-07 PROCEDURE — 97530 THERAPEUTIC ACTIVITIES: CPT

## 2022-04-07 NOTE — PROGRESS NOTES
PT DAILY TREATMENT NOTE     Patient Name: Clemente King  Date:2022  : 1975  [x]  Patient  Verified  Payor: Beccadasha Faria / Plan: [address] [place] [address] HMO / Product Type: Managed Care Medicare /    In time:7:45  Out time:8:53  Total Treatment Time (min): 68  Visit #: 2 of 12    Medicare/BCBS Only   Total Timed Codes (min):  58 1:1 Treatment Time:  58       Treatment Area: Upper back pain [M54.9]  Other low back pain [M54.59]    SUBJECTIVE  Pain Level (0-10 scale): 8  Any medication changes, allergies to medications, adverse drug reactions, diagnosis change, or new procedure performed?: [x] No    [] Yes (see summary sheet for update)  Subjective functional status/changes:   [] No changes reported  \"I have been focusing on doing my exercises at home, especially the deep breathing exercises. They have been challenging because I just had my booster shot in my right arm and it is very sore\"    OBJECTIVE    Modality rationale: decrease pain and increase tissue extensibility to improve the patients ability to perform ADLs and rest comfortably following therapy.     Min Type Additional Details    [] Estim:  []Unatt       []IFC  []Premod                        []Other:  []w/ice   []w/heat  Position:   Location:     [] Estim: []Att    []TENS instruct  []NMES                    []Other:  []w/US   []w/ice   []w/heat  Position:   Location:     []  Traction: [] Cervical       []Lumbar                       [] Prone          []Supine                       []Intermittent   []Continuous Lbs:  [] before manual  [] after manual    []  Ultrasound: []Continuous   [] Pulsed                           []1MHz   []3MHz W/cm2:  Location:    []  Iontophoresis with dexamethasone         Location: [] Take home patch   [] In clinic   10 []  Ice     [x]  heat  []  Ice massage  []  Laser   []  Anodyne Position: seated  Location: low back    []  Laser with stim  []  Other:  Position:  Location:    []  Vasopneumatic Device    []  Right     []  Left  Pre-treatment girth:  Post-treatment girth:  Measured at (location):  Pressure:       [] lo [] med [] hi   Temperature: [] lo [] med [] hi   [x] Skin assessment post-treatment:  [x]intact []redness- no adverse reaction    []redness  adverse reaction:     18 min Therapeutic Exercise:  [x] See flow sheet :   Rationale: increase ROM and increase strength to improve UE/LE strength/mobility and cervical/lumbar mobility to improve ease of ADLs, household chores, and gait. 12 min Therapeutic Activity:  [x]  See flow sheet :   Rationale: increase strength, improve coordination, improve balance, and increase proprioception to improve the patients ability to perform transfers, stair negotiation, functional overhead reach/lifts, and functional carries. Pt education: importance of chest expansion for deep inspiration, diaphragmatic breathing, achieving upright posture and benefits, BP monitoring     28 min Neuromuscular Re-education:  [x]  See flow sheet : focus on scapular retraction with breathing patterns   Rationale: increase strength, improve coordination, improve balance and increase proprioception to improve the patients ability to perform functional tasks with improved abdominal brace utilization, improved hip/knee stability, improved scapular control, improved deep cervical flexor endurance, and decreased risk for falls.             With   [x] TE   [] TA   [] neuro   [] other: Patient Education: [x] Review HEP    [] Progressed/Changed HEP based on:   [] positioning   [] body mechanics   [] transfers   [] heat/ice application    [] other:      Other Objective/Functional Measures:   - BP- 122/88 mm Hg   - RPE after UBE - 6/10  - Pt notes improved ease with scapular retractions when supine as compared to standing/seated    Pain Level (0-10 scale) post treatment: 6-7    ASSESSMENT/Changes in Function: Patient requires skilled verbal and visual cuing to incorporate more appropriate breathing patterns (longer periods of inhalation, focusing on timing inhalation with periods of chest expansion). Patient able to achieve longer periods of inspiration following verbal counting from therapist and better able to synchronize inspiration with chest expansion with practice. Patient will continue to benefit from skilled PT services to modify and progress therapeutic interventions, address functional mobility deficits, address ROM deficits, address strength deficits, analyze and address soft tissue restrictions, analyze and cue movement patterns, analyze and modify body mechanics/ergonomics, assess and modify postural abnormalities and address imbalance/dizziness to attain remaining goals. []  See Plan of Care  []  See progress note/recertification  []  See Discharge Summary         Progress towards goals / Updated goals:  PN: 5/3/22  · Short Term Goals: To be accomplished in  2  weeks:   1. Pt will be compliant w/ HEP 5/7 days a week to manage S&S. Status at last assessment issued HEP  Current: progressing, pt reports regular compliance, especially with breathing exercises (4/7/22)  2. Pt will demo a 2:4 ration of inhale/exhale breathing pattern in order to improve rib excursion and allow greater rib mobility/less pain w/ functional movement. Status at last assessment 1:2  · Long Term Goals: To be accomplished in  6  weeks:  1. Pt will have improved l/s extension AROM to 15 deg to allow for more erect posture and reduced l/s strain during ambulation/standing tasks. Status at last assessment neutral: exhibits 15 d trunk flex in standing  2. Pt will have improved bridge ability to full range in order to allow for greater ease of transitions in bed. Status at last assessment limited 50%  3. Pt will have improved FOTO score to predicted value of 57/100 in order to indicate improved tolerance to functional activity. Status at last assessment 43/100  4.  Pt will have increased rib excursion to 1.5 inches in order to indicate better ability to take deep breaths, to allow for greater air flow and decreased effort of breathing w/ functional activity.    Status at last assessment 1/2 inch    PLAN  [x]  Upgrade activities as tolerated     [x]  Continue plan of care  []  Update interventions per flow sheet       []  Discharge due to:_  []  Other:_      Janelle Agarwal 4/7/2022  7:34 AM    Future Appointments   Date Time Provider J[facility]   4/7/2022  7:45 AM Ruthe Anger [facility]   4/11/2022 11:30 AM Ruthe Anger [facility]   4/15/2022  7:15 AM Ruthe Anger [facility]   4/18/2022  9:15 AM Mabeline Heading., PT [facility]   4/20/2022  7:00 AM Mabeline Heading., PT [facility]   4/26/2022  9:15 AM [facility] PT [facility]   4/29/2022  7:15 AM Ruthe Anger [facility]   5/3/2022  9:15 AM [facility] PT [facility]   5/5/2022  9:15 AM [facility] PT [facility] 2 [facility]   5/10/2022  9:15 AM [facility] PT [facility]   5/12/2022  9:15 AM [facility] PT [facility]   6/1/2022  8:45 AM Marceil Collet, MD AMA [facility]

## 2022-04-11 ENCOUNTER — APPOINTMENT (OUTPATIENT)
Dept: PHYSICAL THERAPY | Age: 47
End: 2022-04-11
Payer: MEDICARE

## 2022-04-13 ENCOUNTER — HOSPITAL ENCOUNTER (OUTPATIENT)
Dept: PHYSICAL THERAPY | Age: 47
Discharge: HOME | End: 2022-04-13
Payer: MEDICARE

## 2022-04-13 PROCEDURE — 97112 NEUROMUSCULAR REEDUCATION: CPT

## 2022-04-13 PROCEDURE — 97110 THERAPEUTIC EXERCISES: CPT

## 2022-04-13 NOTE — PROGRESS NOTES
PT DAILY TREATMENT NOTE     Patient Name: Kathy Stearns  Date:2022  : 1975  [x]  Patient  Verified  Payor: Rosamaria Juarez / Plan: [address] [place] [address] HMO / Product Type: Managed Care Medicare /    In time:921  Out time:949  Total Treatment Time (min): 28  Visit #: 3 of 12    Medicare/BCBS Only   Total Timed Codes (min):  28 1:1 Treatment Time:  28       Treatment Area: Upper back pain [M54.9]  Other low back pain [M54.59]    SUBJECTIVE  Pain Level (0-10 scale): 6  Any medication changes, allergies to medications, adverse drug reactions, diagnosis change, or new procedure performed?: [x] No    [] Yes (see summary sheet for update)  Subjective functional status/changes:   [] No changes reported  Patient anxious about her mom not feeling well this morning     OBJECTIVE    Modality rationale: decrease pain and increase tissue extensibility to improve the patients ability to perform ADLs and rest comfortably following therapy.     Min Type Additional Details    [] Estim:  []Unatt       []IFC  []Premod                        []Other:  []w/ice   []w/heat  Position:   Location:     [] Estim: []Att    []TENS instruct  []NMES                    []Other:  []w/US   []w/ice   []w/heat  Position:   Location:     []  Traction: [] Cervical       []Lumbar                       [] Prone          []Supine                       []Intermittent   []Continuous Lbs:  [] before manual  [] after manual    []  Ultrasound: []Continuous   [] Pulsed                           []1MHz   []3MHz W/cm2:  Location:    []  Iontophoresis with dexamethasone         Location: [] Take home patch   [] In clinic   10 []  Ice     [x]  heat  []  Ice massage  []  Laser   []  Anodyne Position: seated  Location: low back    []  Laser with stim  []  Other:  Position:  Location:    []  Vasopneumatic Device    []  Right     []  Left  Pre-treatment girth:  Post-treatment girth:  Measured at (location):  Pressure:       [] lo [] med [] hi   Temperature: [] lo [] med [] hi   [x] Skin assessment post-treatment:  [x]intact []redness- no adverse reaction    []redness  adverse reaction:     18 min Therapeutic Exercise:  [x] See flow sheet :assess while on Nustep    Rationale: increase ROM and increase strength to improve UE/LE strength/mobility and cervical/lumbar mobility to improve ease of ADLs, household chores, and gait.    0 min Therapeutic Activity:  [x]  See flow sheet :NT    Rationale: increase strength, improve coordination, improve balance, and increase proprioception to improve the patients ability to perform transfers, stair negotiation, functional overhead reach/lifts, and functional carries. 10 min Neuromuscular Re-education:  [x]  See flow sheet : postural education, breathing education    Rationale: increase strength, improve coordination, improve balance and increase proprioception to improve the patients ability to perform functional tasks with improved abdominal brace utilization, improved hip/knee stability, improved scapular control, improved deep cervical flexor endurance, and decreased risk for falls. With   [x] TE    Patient Education: [x] Review HEP         Other Objective/Functional Measures:   - BP- not taken today     SB- unable to perform prolonged hold sec to breathing issues     Pain Level (0-10 scale) post treatment: 6    ASSESSMENT/Changes in Function: Patient reports first FU treatment went well and she felt like she was breathing  A little better afterwards. Todays treatment cut short because patient anxious regarding mothers illness at home and requested shortened treatment but didn't want to cancel. Cont VCing throughout treatment for breath control. Patient perseverates on breathing issues with inhaling through nose.  Possibly could benefit from pursed lip breathing for lung control     Patient will continue to benefit from skilled PT services to modify and progress therapeutic interventions, address functional mobility deficits, address ROM deficits, address strength deficits, analyze and address soft tissue restrictions, analyze and cue movement patterns, analyze and modify body mechanics/ergonomics, assess and modify postural abnormalities and address imbalance/dizziness to attain remaining goals. [x]  See Plan of Care  []  See progress note/recertification  []  See Discharge Summary         Progress towards goals / Updated goals:  PN: 5/3/22  · Short Term Goals: To be accomplished in  2  weeks:   1. Pt will be compliant w/ HEP 5/7 days a week to manage S&S. Status at last assessment issued HEP  Current: progressing, pt reports regular compliance, especially with breathing exercises (4/7/22)    2. Pt will demo a 2:4 ration of inhale/exhale breathing pattern in order to improve rib excursion and allow greater rib mobility/less pain w/ functional movement. Status at last assessment 1:2    · Long Term Goals: To be accomplished in  6  weeks:  1. Pt will have improved l/s extension AROM to 15 deg to allow for more erect posture and reduced l/s strain during ambulation/standing tasks. Status at last assessment neutral: exhibits 15 d trunk flex in standing    2. Pt will have improved bridge ability to full range in order to allow for greater ease of transitions in bed. Status at last assessment limited 50%    3. Pt will have improved FOTO score to predicted value of 57/100 in order to indicate improved tolerance to functional activity. Status at last assessment 43/100    4. Pt will have increased rib excursion to 1.5 inches in order to indicate better ability to take deep breaths, to allow for greater air flow and decreased effort of breathing w/ functional activity.    Status at last assessment 1/2 inch  Current; - no change 4/13/2022    PLAN  [x]  Upgrade activities as tolerated     [x]  Continue plan of care  []  Update interventions per flow sheet       []  Discharge due to:_  [x] Other:_ resume full program NV      Ivory Crowley, PT 4/13/2022  7:34 AM    Future Appointments   Date Time Provider [place] [place]   4/13/2022  9:15 AM Cedric Saldana., PT [facility]   4/15/2022  7:15 AM [place] [facility]   4/20/2022  7:00 AM Cedric Saldana., PT [facility]   4/26/2022  9:15 AM [facility] PT [place] [facility]   4/29/2022  7:15 AM [place] [facility]   5/3/2022  9:15 AM [facility] PT [place] [facility]   5/5/2022  9:15 AM [facility] PT [place] [facility]   5/10/2022  9:15 AM [facility] PT [place] [facility]   5/12/2022  9:15 AM [facility] PT [place] [facility]   6/1/2022  8:45 AM Josefina Pruitt MD [facility]

## 2022-04-15 ENCOUNTER — HOSPITAL ENCOUNTER (OUTPATIENT)
Dept: PHYSICAL THERAPY | Age: 47
Discharge: HOME | End: 2022-04-15
Payer: MEDICARE

## 2022-04-15 PROCEDURE — 97112 NEUROMUSCULAR REEDUCATION: CPT

## 2022-04-15 PROCEDURE — 97110 THERAPEUTIC EXERCISES: CPT

## 2022-04-15 NOTE — PROGRESS NOTES
PT DAILY TREATMENT NOTE     Patient Name: Antoni Hammond  Date:4/15/2022  : 1975  [x]  Patient  Verified  Payor: Alexander Snyder / Plan: [address] [place] [address] HMO / Product Type: Managed Care Medicare /    In time:7:18  Out time:8:12  Total Treatment Time (min): 54  Visit #: 4 of 12    Medicare/BCBS Only   Total Timed Codes (min):  44 1:1 Treatment Time:  44       Treatment Area: Upper back pain [M54.9]  Other low back pain [M54.59]    SUBJECTIVE  Pain Level (0-10 scale): 8  Any medication changes, allergies to medications, adverse drug reactions, diagnosis change, or new procedure performed?: [x] No    [] Yes (see summary sheet for update)  Subjective functional status/changes:   [] No changes reported  \"I did not get good news about my mother on Wednesday and heard bad news about my daughter's father yesterday. It has been a difficult weak. I think that the focus on breathing has been helpful, I am more aware of my breathing now as compared to before\"    OBJECTIVE    Modality rationale: decrease pain and increase tissue extensibility to improve the patients ability to perform ADLs and rest comfortably following therapy.     Min Type Additional Details    [] Estim:  []Unatt       []IFC  []Premod                        []Other:  []w/ice   []w/heat  Position:   Location:     [] Estim: []Att    []TENS instruct  []NMES                    []Other:  []w/US   []w/ice   []w/heat  Position:   Location:     []  Traction: [] Cervical       []Lumbar                       [] Prone          []Supine                       []Intermittent   []Continuous Lbs:  [] before manual  [] after manual    []  Ultrasound: []Continuous   [] Pulsed                           []1MHz   []3MHz W/cm2:  Location:    []  Iontophoresis with dexamethasone         Location: [] Take home patch   [] In clinic   10 [x]  Ice     []  heat  []  Ice massage  []  Laser   []  Anodyne Position: seated  Location: low back    []  Laser with stim  []  Other:  Position:  Location:    []  Vasopneumatic Device    []  Right     []  Left  Pre-treatment girth:  Post-treatment girth:  Measured at (location):  Pressure:       [] lo [] med [] hi   Temperature: [] lo [] med [] hi   [x] Skin assessment post-treatment:  [x]intact []redness- no adverse reaction    []redness  adverse reaction:     27 min Therapeutic Exercise:  [x] See flow sheet :   Rationale: increase ROM and increase strength to improve LE strength/mobility and  lumbar mobility to improve ease of ADLs and gait. 17 min Neuromuscular Re-education:  [x]  See flow sheet :   Rationale: increase strength, improve coordination, improve balance and increase proprioception  to improve the patients ability to perform functional tasks with improved abdominal brace utilization, improved control, and decreased risk for falls. With   [x] TE   [] TA   [x] neuro   [] other: Patient Education: [x] Review HEP    [] Progressed/Changed HEP based on:   [] positioning   [] body mechanics   [] transfers   [] heat/ice application    [] other:      Other Objective/Functional Measures: -Substituted open books for low trunk rotations per pt tolerance     Pain Level (0-10 scale) post treatment: 6-7    ASSESSMENT/Changes in Function: Focused on pectoral stretching at beginning of session to reinforce chest expansion and then progressed to standing breathing control interventions. Pt continues to demonstrate quick, forceful inspiration as preferred method. Pt self initiates hand on belly tactile cue to facilitate diaphragmatic breathing.     Patient will continue to benefit from skilled PT services to modify and progress therapeutic interventions, address functional mobility deficits, address ROM deficits, address strength deficits, analyze and address soft tissue restrictions, analyze and cue movement patterns, analyze and modify body mechanics/ergonomics, assess and modify postural abnormalities and address imbalance/dizziness to attain remaining goals. []  See Plan of Care  []  See progress note/recertification  []  See Discharge Summary         Progress towards goals / Updated goals: · Short Term Goals: To be accomplished in  2  weeks:   1.  Pt will be compliant w/ HEP 5/7 days a week to manage S&S.   Status at last assessment issued HEP  Current: progressing, pt reports regular compliance, especially with breathing exercises (4/7/22)     2.   Pt will demo a 2:4 ration of inhale/exhale breathing pattern in order to improve rib excursion and allow greater rib mobility/less pain w/ functional movement. Status at last assessment 1:2  Current: not met, pt cont to demo 1:2 ratio characterized by forceful inspiration (4/15/22)     · Long Term Goals: To be accomplished in  6  weeks:  1.  Pt will have improved l/s extension AROM to 15 deg to allow for more erect posture and reduced l/s strain during ambulation/standing tasks.   Status at last assessment neutral: exhibits 15 d trunk flex in standing     2. Pt will have improved bridge ability to full range in order to allow for greater ease of transitions in bed.   Status at last assessment limited 50%     3. Pt will have improved FOTO score to predicted value of 57/100 in order to indicate improved tolerance to functional activity.   Status at last assessment 43/100     4.  Pt will have increased rib excursion to 1.5 inches in order to indicate better ability to take deep breaths, to allow for greater air flow and decreased effort of breathing w/ functional activity.   Status at last assessment 1/2 inch  Current; - no change (4/13/2022)    PLAN  [x]  Upgrade activities as tolerated     [x]  Continue plan of care  []  Update interventions per flow sheet       []  Discharge due to:_  []  Other:_      Dusty Dawnffield 4/15/2022  7:05 AM    Future Appointments   Date Time Provider [place] [place]   4/15/2022  7:15 AM [facility]   4/20/2022  7:00 AM Cedric Saldana., PT [facility]   4/26/2022  9:15 AM [facility] PT [place] [facility]   4/29/2022  7:15 AM Gabrielle Veras [facility]   5/3/2022  9:15 AM [facility] [place] [facility]   5/5/2022  9:15 AM [facility] PT [place] [facility]   5/10/2022  9:15 AM [facility] [place] [facility]   5/12/2022  9:15 AM [facility] PT [place] [facility]   6/1/2022  8:45 AM MD JOHNATHON Grajeda [facility]

## 2022-04-18 ENCOUNTER — APPOINTMENT (OUTPATIENT)
Dept: PHYSICAL THERAPY | Age: 47
End: 2022-04-18
Payer: MEDICARE

## 2022-04-20 ENCOUNTER — HOSPITAL ENCOUNTER (OUTPATIENT)
Dept: PHYSICAL THERAPY | Age: 47
Discharge: HOME | End: 2022-04-20
Payer: MEDICARE

## 2022-04-20 PROCEDURE — 97140 MANUAL THERAPY 1/> REGIONS: CPT

## 2022-04-20 PROCEDURE — 97112 NEUROMUSCULAR REEDUCATION: CPT

## 2022-04-20 PROCEDURE — 97110 THERAPEUTIC EXERCISES: CPT

## 2022-04-20 NOTE — PROGRESS NOTES
PT DAILY TREATMENT NOTE     Patient Name: Elaine Mxa  Date:2022  : 1975  [x]  Patient  Verified  Payor: [employer]ha [employer] [place] / Plan: [address] [place] [address] HMO / Product Type: Managed Care Medicare /    In time:656  Out time: 803  Total Treatment Time (min): 67  Visit #: 5 of 12    Medicare/BCBS Only   Total Timed Codes (min):  57 1:1 Treatment Time:  732 - 750 (54)       Treatment Area: Upper back pain [M54.9]  Other low back pain [M54.59]    SUBJECTIVE  Pain Level (0-10 scale): 5 ls   Any medication changes, allergies to medications, adverse drug reactions, diagnosis change, or new procedure performed?: [x] No    [] Yes (see summary sheet for update)  Subjective functional status/changes:   [] No changes reported  Patient reports \"not too bad this morning. \"    OBJECTIVE    Modality rationale: decrease pain and increase tissue extensibility to improve the patients ability to perform ADLs and rest comfortably following therapy.     Min Type Additional Details    [] Estim:  []Unatt       []IFC  []Premod                        []Other:  []w/ice   []w/heat  Position:   Location:     [] Estim: []Att    []TENS instruct  []NMES                    []Other:  []w/US   []w/ice   []w/heat  Position:   Location:     []  Traction: [] Cervical       []Lumbar                       [] Prone          []Supine                       []Intermittent   []Continuous Lbs:  [] before manual  [] after manual    []  Ultrasound: []Continuous   [] Pulsed                           []1MHz   []3MHz W/cm2:  Location:    []  Iontophoresis with dexamethasone         Location: [] Take home patch   [] In clinic   10 [x]  Ice     []  heat  []  Ice massage  []  Laser   []  Anodyne Position: semi reclined  Location: low back and TS     []  Laser with stim  []  Other:  Position:  Location:    []  Vasopneumatic Device    []  Right     []  Left  Pre-treatment girth:  Post-treatment girth:  Measured at (location):  Pressure:       [] lo [] med [] hi   Temperature: [] lo [] med [] hi   [x] Skin assessment post-treatment:  [x]intact []redness- no adverse reaction    []redness  adverse reaction:     30 min Therapeutic Exercise:  [x] See flow sheet :Assess while on nustep    Rationale: increase ROM and increase strength to improve LE strength/mobility and  lumbar mobility to improve ease of ADLs and gait. 18 min Neuromuscular Re-education:  [x]  See flow sheet   Box breathing : 2 count in, hold 2 count, 2 count out , 2 count hold    Rationale: increase strength, improve coordination, improve balance and increase proprioception  to improve the patients ability to perform functional tasks with improved abdominal brace utilization, improved control, and decreased risk for falls. 9 min Manual TS T4-10 PA mobs grade 1-2 for pain relief and mobility   STM to rhomboids    Rationale: Improve thoracic mobility for lung expansion and improved breathing             With  rx Patient Education: [x] Review HEP    Smoking cessation      Other Objective/Functional Measures:  LTG 1 - LS extension : 25%    NMRE - added prone TA for core control   ROM - added LYNDSAY for LS mobility for ADLs. Pain Level (0-10 scale) post treatment: 4    ASSESSMENT/Changes in Function: Patient reports she doesn't like LTR because she doesn't like to lay supine. Iniated treatment with manual intervention to TS. Patient demo poor TS mobility and moderate \"good\" pain with PA mobs. Patient discussed smoking cessation program she is in for clincal trial for new meds - she has not started med yet therefor is still \"required\" to smoke 10 cig per day. Initiated hold with diaphragmatic breathing called box breathing to encourage diaphragm endurance and parasympathetic NS depression.       Patient will continue to benefit from skilled PT services to modify and progress therapeutic interventions, address functional mobility deficits, address ROM deficits, address strength deficits, analyze and address soft tissue restrictions, analyze and cue movement patterns, analyze and modify body mechanics/ergonomics, assess and modify postural abnormalities and address imbalance/dizziness to attain remaining goals. [x]  See Plan of Care  []  See progress note/recertification  []  See Discharge Summary         Progress towards goals / Updated goals:  PN due 5/5  · Short Term Goals: To be accomplished in  2  weeks:   1.  Pt will be compliant w/ HEP 5/7 days a week to manage S&S.   Status at last assessment issued HEP  Current: progressing, pt reports regular compliance, especially with breathing exercises (4/7/22)     2.   Pt will demo a 2:4 ration of inhale/exhale breathing pattern in order to improve rib excursion and allow greater rib mobility/less pain w/ functional movement. Status at last assessment 1:2  Current: not met, pt cont to demo 1:2 ratio characterized by forceful inspiration (4/15/22)     · Long Term Goals: To be accomplished in  6  weeks:  1.  Pt will have improved l/s extension AROM to 15 deg to allow for more erect posture and reduced l/s strain during ambulation/standing tasks.   Status at last assessment neutral: exhibits 15 d trunk flex in standing  Current: goal progressing to approx 25% 4/20/2022     2. Pt will have improved bridge ability to full range in order to allow for greater ease of transitions in bed.   Status at last assessment limited 50%     3. Pt will have improved FOTO score to predicted value of 57/100 in order to indicate improved tolerance to functional activity.   Status at last assessment 43/100     4.  Pt will have increased rib excursion to 1.5 inches in order to indicate better ability to take deep breaths, to allow for greater air flow and decreased effort of breathing w/ functional activity.   Status at last assessment 1/2 inch  Current; - no change (4/13/2022)    PLAN  [x]  Upgrade activities as tolerated     [x]  Continue plan of care  []  Update interventions per flow sheet       []  Discharge due to:_  [x]  Other:_ assess response to manual      Ivory Crowley, PT 4/20/2022     Future Appointments   Date Time Provider [place]   4/20/2022  7:00 AM Cedric Saldana., PT [facility]   4/26/2022  9:15 AM [facility] PT [place] [facility]   4/29/2022  7:15 AM Gabrielle Veras [facility]   5/3/2022  9:15 AM [facility] [place] [facility]   5/5/2022  9:15 AM [facility] PT [place] [facility]   5/10/2022  9:15 AM [facility] [place] [facility]   5/12/2022  9:15 AM [facility] PT [place] [facility]   6/1/2022  8:45 AM MD JOHNATHON Grajeda AMB

## 2022-04-21 ENCOUNTER — PATIENT OUTREACH (OUTPATIENT)
Dept: CASE MANAGEMENT | Age: 47
End: 2022-04-21
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
## 2022-04-26 ENCOUNTER — HOSPITAL ENCOUNTER (OUTPATIENT)
Dept: PHYSICAL THERAPY | Age: 47
Discharge: HOME | End: 2022-04-26
Payer: MEDICARE

## 2022-04-26 PROCEDURE — 97110 THERAPEUTIC EXERCISES: CPT

## 2022-04-26 PROCEDURE — 97112 NEUROMUSCULAR REEDUCATION: CPT

## 2022-04-26 NOTE — PROGRESS NOTES
PT DAILY TREATMENT NOTE     Patient Name: Ilya Ascencio  Date:2022  : 1975  [x]  Patient  Verified  Payor: Danny An / Plan: [address] [place] [address] HMO / Product Type: Managed Care Medicare /    In time:   Out time: 948   Total Treatment Time (min): 33   Visit #: 6 of 12    Medicare/BCBS Only   Total Timed Codes (min):  33  1:1 Treatment Time:33       Treatment Area: Upper back pain [M54.9]  Other low back pain [M54.59]    SUBJECTIVE  Pain Level (0-10 scale): 4/10 in standing, 8/10 in standing (top of stomach, bottom of chest)  Any medication changes, allergies to medications, adverse drug reactions, diagnosis change, or new procedure performed?: [x] No    [] Yes (see summary sheet for update)  Subjective functional status/changes:   [] No changes reported  \" I have been practicing my breathing w/ my back against the wall\". Pt noted that sitting up is harder as compared to the standing . Pt requesting to leave early today 2/2 having another appt. Requesting to leave by 945. Pt notes that she has had difficulty the last 3 days and relates it to not being in therapy x 6 days. Denied any adverse responses. Notes that all exercises have improved. OBJECTIVE    Modality rationale: decrease pain and increase tissue extensibility to improve the patients ability to perform ADLs and rest comfortably following therapy.     Min Type Additional Details    [] Estim:  []Unatt       []IFC  []Premod                        []Other:  []w/ice   []w/heat  Position:   Location:     [] Estim: []Att    []TENS instruct  []NMES                    []Other:  []w/US   []w/ice   []w/heat  Position:   Location:     []  Traction: [] Cervical       []Lumbar                       [] Prone          []Supine                       []Intermittent   []Continuous Lbs:  [] before manual  [] after manual    []  Ultrasound: []Continuous   [] Pulsed                           []1MHz   []3MHz W/cm2:  Location:    [] Iontophoresis with dexamethasone         Location: [] Take home patch   [] In clinic   PD [x]  Ice     []  heat  []  Ice massage  []  Laser   []  Anodyne Position: semi reclined  Location: low back and TS     []  Laser with stim  []  Other:  Position:  Location:    []  Vasopneumatic Device    []  Right     []  Left  Pre-treatment girth:  Post-treatment girth:  Measured at (location):  Pressure:       [] lo [] med [] hi   Temperature: [] lo [] med [] hi   [x] Skin assessment post-treatment:  [x]intact []redness- no adverse reaction    []redness  adverse reaction:     15 min Therapeutic Exercise:  [x] See flow sheet :Assess while on nustep    Rationale: increase ROM and increase strength to improve LE strength/mobility and  lumbar mobility to improve ease of ADLs and gait. 18 min Neuromuscular Re-education:  [x]  See flow sheet   Box breathing : 2 count in, hold 2 count, 2 count out , 2 count hold    Rationale: increase strength, improve coordination, improve balance and increase proprioception  to improve the patients ability to perform functional tasks with improved abdominal brace utilization, improved control, and decreased risk for falls. X min Manual TS T4-10 PA mobs grade 1-2 for pain relief and mobility   STM to rhomboids    Rationale: Improve thoracic mobility for lung expansion and improved breathing             With  TE/NM Patient Education: [x] Review HEP , encourage pt to perform tongue clicks and iso at home, can use lollipop to assist w/ this task, holding pop in mouth w/ tongue and trying to pull out, hold 5 seconds and relax. Pt verbalized understanding.    Smoking cessation      Other Objective/Functional Measures:  + modified tx program to accommodate pt time constraints w/ another appt, open books while standing at the wall    + progressed    - doorway stretch from low to med level 4x30 sec   - 2x12  TB rows/ext   + Neuro re-edu added:   - wall angels w/ back to wall: inhale w/ elevation, exhale w/ return to     Start   - tongue clicks/iso holds to improve oral strength to improve overall    efficiency of breathing    Pain Level (0-10 scale) post treatment: 6/10    ASSESSMENT/Changes in Function:  Pt requested to modify tx today 2/2 having another appt to go to. Request 30 min session stating \" I just didn't want to cancel\". Emphasis today on coordinated movement patterns w/ breathing. Initiated wall angels w/ emphasis on breath mechanics. However, pt demo full AROM of B shoulders but had difficulty w/ the coordinated breathing pattern, performing 50-75% range before needing to inhale a 2nd time. Noted fatigue today w/ tongue clicks at 5 reps but able to cont to 10 reps w/ less audible click noted. Fatigue may be related to performing other breathing tasks prior. Pt noted cont restrictions w/ t/s mobility. Pt has started the trial for smoking cessation (2.5 weeks ago) and will start the medication on 4/28/22. Pt w/ quit date of May 5, 2022. Post tx, pt w/ slightly elevated pain levels however noted \"its just my chest muscles tired\". Deferred ice 2/2 another appt. Patient will continue to benefit from skilled PT services to modify and progress therapeutic interventions, address functional mobility deficits, address ROM deficits, address strength deficits, analyze and address soft tissue restrictions, analyze and cue movement patterns, analyze and modify body mechanics/ergonomics, assess and modify postural abnormalities and address imbalance/dizziness to attain remaining goals.      [x]  See Plan of Care  []  See progress note/recertification  []  See Discharge Summary         Progress towards goals / Updated goals:  PN due 5/5  · Short Term Goals: To be accomplished in  2  weeks:   1.  Pt will be compliant w/ HEP 5/7 days a week to manage S&S.   Status at last assessment issued HEP  Current: progressing,2x/day per w/ breathing awareness for all activity 4/26/22     2.   Pt will demo a 2:4 ration of inhale/exhale breathing pattern in order to improve rib excursion and allow greater rib mobility/less pain w/ functional movement. Status at last assessment 1:2  Current: progressing, 4/26/22: cont 1:3 sec w/ greater improved coordinated exhalation     · Long Term Goals: To be accomplished in  6  weeks:  1.  Pt will have improved l/s extension AROM to 15 deg to allow for more erect posture and reduced l/s strain during ambulation/standing tasks.   Status at last assessment neutral: exhibits 15 d trunk flex in standing  Current: goal progressing to approx 25% 4/20/2022     2. Pt will have improved bridge ability to full range in order to allow for greater ease of transitions in bed.   Status at last assessment limited 50%     3. Pt will have improved FOTO score to predicted value of 57/100 in order to indicate improved tolerance to functional activity.   Status at last assessment 43/100     4.  Pt will have increased rib excursion to 1.5 inches in order to indicate better ability to take deep breaths, to allow for greater air flow and decreased effort of breathing w/ functional activity.   Status at last assessment 1/2 inch  Current; - no change (4/13/2022)    PLAN  [x]  Upgrade activities as tolerated     [x]  Continue plan of care  []  Update interventions per flow sheet       []  Discharge due to:_  [x]  Other:_ assess response to activity modification/progression, smoking cessation program    Fred Tom, PT 4/26/2022     Future Appointments   Date Time Provider [place] [place]   4/26/2022  9:15 AM 1800 [address] 2 [facility]   4/29/2022  7:15 AM Vini Tinsley [facility]   5/3/2022  9:15 AM [facility] [place] [facility]   5/5/2022  9:15 AM [facility] [place] 2 [facility]   5/10/2022  9:15 AM [facility] [place] [facility]   5/12/2022  9:15 AM [facility] PT [place] 2 [facility]   6/1/2022  8:45 AM Velia Palomo MD [facility]

## 2022-04-28 ENCOUNTER — HOSPITAL ENCOUNTER (OUTPATIENT)
Dept: PHYSICAL THERAPY | Age: 47
Discharge: HOME | End: 2022-04-28
Payer: MEDICARE

## 2022-04-28 PROCEDURE — 97112 NEUROMUSCULAR REEDUCATION: CPT

## 2022-04-28 PROCEDURE — 97110 THERAPEUTIC EXERCISES: CPT

## 2022-04-28 NOTE — PROGRESS NOTES
PT DAILY TREATMENT NOTE     Patient Name: Angela Ward  Date:2022  : 1975  [x]  Patient  Verified  Payor: Hung Nava / Plan: [address] [place] [address] HMO / Product Type: Managed Care Medicare /    In time:   Out time: 1013  Total Treatment Time (min): 48   Visit #: 7 of 12    Medicare/BCBS Only   Total Timed Codes (min):  38  1:1 Treatment Time:38       Treatment Area: Upper back pain [M54.9]  Other low back pain [M54.59]    SUBJECTIVE  Pain Level (0-10 scale): 6/10   Any medication changes, allergies to medications, adverse drug reactions, diagnosis change, or new procedure performed?: [x] No    [] Yes (see summary sheet for update)  Subjective functional status/changes:   [] No changes reported  Pt arrived late to session today. Notes that she feels \"rushed\" this morning. Denies and adverse responses to last session. Pt notes that she came from the study trial office, picking up medication for smoking cessation, will start the medication tomorrow. Noted that her BP was elevated this morning at the MD office 148/98 mmHg. Pt notes that the MD was not concerned 2/2 pt reporting that she did not take her medication this morning for BP. OBJECTIVE    Modality rationale: decrease pain and increase tissue extensibility to improve the patients ability to perform ADLs and rest comfortably following therapy.     Min Type Additional Details    [] Estim:  []Unatt       []IFC  []Premod                        []Other:  []w/ice   []w/heat  Position:   Location:     [] Estim: []Att    []TENS instruct  []NMES                    []Other:  []w/US   []w/ice   []w/heat  Position:   Location:     []  Traction: [] Cervical       []Lumbar                       [] Prone          []Supine                       []Intermittent   []Continuous Lbs:  [] before manual  [] after manual    []  Ultrasound: []Continuous   [] Pulsed                           []1MHz   []3MHz W/cm2:  Location:    []  Iontophoresis with dexamethasone         Location: [] Take home patch   [] In clinic   10 [x]  Ice     []  heat  []  Ice massage  []  Laser   []  Anodyne Position: semi reclined  Location: low back and TS , 2 layers between skin and ice    []  Laser with stim  []  Other:  Position:  Location:    []  Vasopneumatic Device    []  Right     []  Left  Pre-treatment girth:  Post-treatment girth:  Measured at (location):  Pressure:       [] lo [] med [] hi   Temperature: [] lo [] med [] hi   [x] Skin assessment post-treatment:  [x]intact []redness- no adverse reaction    []redness  adverse reaction:     13 min Therapeutic Exercise:  [x] See flow sheet :Assess while on nustep    Rationale: increase ROM and increase strength to improve LE strength/mobility and  lumbar mobility to improve ease of ADLs and gait. 25 min Neuromuscular Re-education:  [x]  See flow sheet      Rationale: increase strength, improve coordination, improve balance and increase proprioception  to improve the patients ability to perform functional tasks with improved abdominal brace utilization, improved control, and decreased risk for falls. X min Manual TS T4-10 PA mobs grade 1-2 for pain relief and mobility   STM to rhomboids    Rationale: Improve thoracic mobility for lung expansion and improved breathing             With  TE/NM Patient Education: [x] Review HEP, edu on progress made and intent of activity progressions.       Other Objective/Functional Measures:  + pt arrived late to session today x 10 min   + progressed:    - level 2 on the nu-step     - inc reps of box breathing to 15   - tongue clicks and tongue iso while holding CV nods    + modifications:   - wall angels to semi-reclined position   -  TA activation in semi-reclined    - bridge: requested to perform in the supine position    BP: 138/88 mmHg RUE, brachial artery, seated, manual cuff    Pain Level (0-10 scale) post treatment:4 /10    ASSESSMENT/Changes in Function:  Pt 10  Min late to session today 2/2 coming from another appt. Pt apologetic for late arrival. Tx modified to accommodate time constraint. Noted improved ability to perform full inhale and full UE elevation today during wall angels. Hypomobility in the t/s addressed today w/ thoracic rotation w/ open books, doorway stretching. Pt noted to have intermittent audible \"pop\" of the joints w/ initial performance of these tasks. Denied any pain. Discussed normalcy of restricted joints and cavitations w/ exercises targeting mobility. Pt verbalized understanding. Pt demo difficulty performing tongue clicks and isometrics while holding the CV nod, indicating cont weakness int he deep neck flexor muscles. 75% v.c. throughout session for deep breathing as pt noted to have difficulty w/ coordinated movement/breathing patterns. Improvement towards goals noted w/ pt demo 3/4 in rib excursion during and improved breathing ration of 1:3. Has been compliant w/ HEP. Will cont to progress pt w/ all tx for ROM in the t/s , deep breathing, t/s extension as pt tolerates. Patient will continue to benefit from skilled PT services to modify and progress therapeutic interventions, address functional mobility deficits, address ROM deficits, address strength deficits, analyze and address soft tissue restrictions, analyze and cue movement patterns, analyze and modify body mechanics/ergonomics, assess and modify postural abnormalities and address imbalance/dizziness to attain remaining goals.      [x]  See Plan of Care  []  See progress note/recertification  []  See Discharge Summary         Progress towards goals / Updated goals:  PN due 5/5  · Short Term Goals: To be accomplished in  2  weeks:   1.  Pt will be compliant w/ HEP 5/7 days a week to manage S&S.   Status at last assessment issued HEP  Current: progressing,2x/day per w/ breathing awareness for all activity 4/26/22     2.   Pt will demo a 2:4 ration of inhale/exhale breathing pattern in order to improve rib excursion and allow greater rib mobility/less pain w/ functional movement. Status at last assessment 1:2  Current: progressing, 4/26/22: cont 1:3 sec w/ greater improved coordinated exhalation     · Long Term Goals: To be accomplished in  6  weeks:  1.  Pt will have improved l/s extension AROM to 15 deg to allow for more erect posture and reduced l/s strain during ambulation/standing tasks.   Status at last assessment neutral: exhibits 15 d trunk flex in standing  Current: goal progressing to approx 25% 4/20/2022     2. Pt will have improved bridge ability to full range in order to allow for greater ease of transitions in bed.   Status at last assessment limited 50%  Current: 4/28/22: performed in semi-reclined position: no change, difficulty coordinating breathing patterns     3. Pt will have improved FOTO score to predicted value of 57/100 in order to indicate improved tolerance to functional activity.   Status at last assessment 43/100     4.  Pt will have increased rib excursion to 1.5 inches in order to indicate better ability to take deep breaths, to allow for greater air flow and decreased effort of breathing w/ functional activity.   Status at last assessment 1/2 inch  Current: 4/28/22: 3/4 inch    PLAN  [x]  Upgrade activities as tolerated     [x]  Continue plan of care  []  Update interventions per flow sheet       []  Discharge due to:_  [x]  Other:_ progress to L3 on the nu-step, add foam roller to angle activity, assess response to activity modification/progression, smoking cessation program        The Vaishali, PT 4/28/2022     Future Appointments   Date Time Provider [place] [place]   5/3/2022  9:15 AM [facility] [place] [facility]   5/5/2022  9:15 AM [facility] PT [place] 2 [facility]   5/10/2022  9:15 AM [facility] [place] [facility]   5/12/2022  9:15 AM [facility] PT [place] [facility]   6/21/2022 10:45 AM Eliana Aguilar MD A[facility] AM[facility]

## 2022-04-29 ENCOUNTER — APPOINTMENT (OUTPATIENT)
Dept: PHYSICAL THERAPY | Age: 47
End: 2022-04-29
Payer: MEDICARE

## 2022-05-03 ENCOUNTER — HOSPITAL ENCOUNTER (OUTPATIENT)
Dept: PHYSICAL THERAPY | Age: 47
Discharge: HOME | End: 2022-05-03
Payer: MEDICARE

## 2022-05-03 PROCEDURE — 97112 NEUROMUSCULAR REEDUCATION: CPT | Performed by: PHYSICAL THERAPIST

## 2022-05-03 PROCEDURE — 97110 THERAPEUTIC EXERCISES: CPT | Performed by: PHYSICAL THERAPIST

## 2022-05-03 NOTE — PROGRESS NOTES
PT DAILY TREATMENT NOTE     Patient Name: Tracy Giordano  Date:5/3/2022  : 1975  [x]  Patient  Verified  Payor: Linette Dumas / Plan: [address] [place] [address] HMO / Product Type: Managed Care Medicare /    In time: 0915am  Out time: 1015pm  Total Treatment Time (min): 60min  Visit #: 8 of 12    Medicare/BCBS Only   Total Timed Codes (min):  60 1:1 Treatment Time: 56       Treatment Area: Upper back pain [M54.9]  Other low back pain [M54.59]    SUBJECTIVE  Pain Level (0-10 scale): 5/10   Any medication changes, allergies to medications, adverse drug reactions, diagnosis change, or new procedure performed?: [x] No    [] Yes (see summary sheet for update)  Subjective functional status/changes:   [] No changes reported   I am quitting smoking, so things cause me anxiety. I prefer to stand for activities. Pt retold Hx. OBJECTIVE    Modality rationale: decrease pain and increase tissue extensibility to improve the patients ability to perform ADLs and rest comfortably following therapy.     Min Type Additional Details    [] Estim:  []Unatt       []IFC  []Premod                        []Other:  []w/ice   []w/heat  Position:   Location:     [] Estim: []Att    []TENS instruct  []NMES                    []Other:  []w/US   []w/ice   []w/heat  Position:   Location:     []  Traction: [] Cervical       []Lumbar                       [] Prone          []Supine                       []Intermittent   []Continuous Lbs:  [] before manual  [] after manual    []  Ultrasound: []Continuous   [] Pulsed                           []1MHz   []3MHz W/cm2:  Location:   PD []  Iontophoresis with dexamethasone         Location: [] Take home patch   [] In clinic    [x]  Ice     []  heat  []  Ice massage  []  Laser   []  Anodyne Position: semi reclined  Location: low back and TS , 2 layers between skin and ice    []  Laser with stim  []  Other:  Position:  Location:    []  Vasopneumatic Device    []  Right     [] Left  Pre-treatment girth:  Post-treatment girth:  Measured at (location):  Pressure:       [] lo [] med [] hi   Temperature: [] lo [] med [] hi   [x] Skin assessment post-treatment:  [x]intact []redness- no adverse reaction    []redness - adverse reaction:     35/31 min Therapeutic Exercise:  _ See flow sheet :    Rationale: increase ROM and increase strength to improve LE strength/mobility and  lumbar mobility to improve ease of ADLs and gait. 25 min Neuromuscular Re-education:  [x]  See flow sheet      Rationale: increase strength, improve coordination, improve balance and increase proprioception  to improve the patients ability to perform functional tasks with improved abdominal brace utilization, improved control, and decreased risk for falls. min Manual TS T4-10 PA mobs grade 1-2 for pain relief and mobility   STM to rhomboids    Rationale: Improve thoracic mobility for lung expansion and improved breathing             With  TE/NM Patient Education: [x] Review HEP, edu on progress made and intent of activity progressions. Other Objective/Functional Measures:  Progressed to wall angels on Foam roll on large mat table, performed clucks supine on mat table with OA nod, R sided open books on mat table  Performed UBE Lv 3 today for UE strength and someone on Nustep  BP:SOB throughout session  Performed 3 stg glut sets prior to bridging with increased activation. Pain Level (0-10 scale) post treatment:  4 /10    ASSESSMENT/Changes in Function:  Patient reports she would usually get spinning when lying on L, but that is improved. Pt is currently sleeping flat in bed and reports no dizziness in am currently. Patient reports difficulty with cluck with lips closed, instructed pt just to relax jaw but stay in nod position. Pt fatigued quickly with nods in supine on foam roll but able to perform 10 reps.  Pt presented with increased tolerance to the supine position today for treatment, but requests elevated head of bed for L SL open books. Pt reports she continues to sleep in R side due to occasional spinning and vertigo on L SL, and reports difficulty focusing eyes. Patient would benefit from vertigo assessment. Pt reports initial vomiting during vertigo session. Pt educated an s/sx of vertigo. Pt reports pain on L with each inspiration during box breathing, and at end of session pain moves around to front of L ribs. Assessed VOR with patient unable to maintain gaze stabilization and reports double vision. Pt given hand out of VOR (rot and nod) for HEP, please assess nv. Pt reports she was seeing an eye specialist for reports of vision changes. Patient will continue to benefit from skilled PT services to modify and progress therapeutic interventions, address functional mobility deficits, address ROM deficits, address strength deficits, analyze and address soft tissue restrictions, analyze and cue movement patterns, analyze and modify body mechanics/ergonomics, assess and modify postural abnormalities and address imbalance/dizziness to attain remaining goals. [x]  See Plan of Care  []  See progress note/recertification  []  See Discharge Summary         Progress towards goals / Updated goals:  PN due 5/5  · Short Term Goals: To be accomplished in  2  weeks:   1.  Pt will be compliant w/ HEP 5/7 days a week to manage S&S.   Status at last assessment issued HEP  Current: progressing,2x/day per w/ breathing awareness for all activity 4/26/22     2.   Pt will demo a 2:4 ration of inhale/exhale breathing pattern in order to improve rib excursion and allow greater rib mobility/less pain w/ functional movement.    Status at last assessment 1:2  Current: progressing, 4/26/22: cont 1:3 sec w/ greater improved coordinated exhalation     · Long Term Goals: To be accomplished in  6  weeks:  1.  Pt will have improved l/s extension AROM to 15 deg to allow for more erect posture and reduced l/s strain during ambulation/standing tasks.   Status at last assessment neutral: exhibits 15 d trunk flex in standing  Current: goal progressing to approx 25% 4/20/2022     2. Pt will have improved bridge ability to full range in order to allow for greater ease of transitions in bed.   Status at last assessment limited 50%  Current: 4/28/22: performed in semi-reclined position: no change, difficulty coordinating breathing patterns     3. Pt will have improved FOTO score to predicted value of 57/100 in order to indicate improved tolerance to functional activity.   Status at last assessment 43/100     4.  Pt will have increased rib excursion to 1.5 inches in order to indicate better ability to take deep breaths, to allow for greater air flow and decreased effort of breathing w/ functional activity.   Status at last assessment 1/2 inch  Current: 4/28/22: 3/4 inch    PLAN  [x]  Upgrade activities as tolerated     [x]  Continue plan of care  []  Update interventions per flow sheet       []  Discharge due to:_  [x]  Other:_assess VOR nv please, PN due NV as well          Denita Mckeon, PT 5/3/2022     Future Appointments   Date Time Provider [place]   5/3/2022  9:15 AM [facility] [place] [facility]   5/5/2022  9:15 AM [facility] PT [place] [facility]   5/10/2022  9:15 AM [facility] [place] [facility]   5/12/2022  9:15 AM [facility] PT [place] [facility]   6/21/2022 10:45 AM MD JOHNATHON Valverde AMB

## 2022-05-05 ENCOUNTER — APPOINTMENT (OUTPATIENT)
Dept: PHYSICAL THERAPY | Age: 47
End: 2022-05-05
Payer: MEDICARE

## 2022-05-10 ENCOUNTER — HOSPITAL ENCOUNTER (OUTPATIENT)
Dept: PHYSICAL THERAPY | Age: 47
Discharge: HOME | End: 2022-05-10
Payer: MEDICARE

## 2022-05-10 PROCEDURE — 97140 MANUAL THERAPY 1/> REGIONS: CPT | Performed by: PHYSICAL THERAPIST

## 2022-05-10 PROCEDURE — 97110 THERAPEUTIC EXERCISES: CPT | Performed by: PHYSICAL THERAPIST

## 2022-05-10 NOTE — PROGRESS NOTES
PT DAILY TREATMENT NOTE     Patient Name: Phyllis Schulz  Date:5/10/2022  : 1975  [x]  Patient  Verified  Payor: Balaji Johnson / Plan: [address] [place] [address] HMO / Product Type: Managed Care Medicare /    In time: 215 am  Out time: 7836 a m  Total Treatment Time (min): 52min  Visit #: 9 of 12    Medicare/BCBS Only   Total Timed Codes (min): 52 1:1 Treatment Time: 52       Treatment Area: Upper back pain [M54.9]  Other low back pain [M54.59]    SUBJECTIVE  Pain Level (0-10 scale): 7/10   Any medication changes, allergies to medications, adverse drug reactions, diagnosis change, or new procedure performed?: [x] No    [] Yes (see summary sheet for update)  Subjective functional status/changes:   [] No changes reported  See PN  OBJECTIVE    Modality rationale: decrease pain and increase tissue extensibility to improve the patients ability to perform ADLs and rest comfortably following therapy.     Min Type Additional Details    [] Estim:  []Unatt       []IFC  []Premod                        []Other:  []w/ice   []w/heat  Position:   Location:     [] Estim: []Att    []TENS instruct  []NMES                    []Other:  []w/US   []w/ice   []w/heat  Position:   Location:     []  Traction: [] Cervical       []Lumbar                       [] Prone          []Supine                       []Intermittent   []Continuous Lbs:  [] before manual  [] after manual    []  Ultrasound: []Continuous   [] Pulsed                           []1MHz   []3MHz W/cm2:  Location:   PD []  Iontophoresis with dexamethasone         Location: [] Take home patch   [] In clinic    [x]  Ice     []  heat  []  Ice massage  []  Laser   []  Anodyne Position: semi reclined  Location: low back and TS , 2 layers between skin and ice    []  Laser with stim  []  Other:  Position:  Location:    []  Vasopneumatic Device    []  Right     []  Left  Pre-treatment girth:  Post-treatment girth:  Measured at (location):  Pressure:       [] lo [] med [] hi   Temperature: [] lo [] med [] hi   [x] Skin assessment post-treatment:  [x]intact []redness- no adverse reaction    []redness  adverse reaction:     44 min Therapeutic Exercise:  _ See flow sheet : assessed on UBE, FOTO, PN reassessment   Rationale: increase ROM and increase strength to improve LE strength/mobility and  lumbar mobility to improve ease of ADLs and gait. min Neuromuscular Re-education:  [x]  See flow sheet      Rationale: increase strength, improve coordination, improve balance and increase proprioception  to improve the patients ability to perform functional tasks with improved abdominal brace utilization, improved control, and decreased risk for falls. 8 min Manual TS T4-10 PA mobs grade 1-2 for pain relief and mobility   STM to rhomboids, costochondral mobes on L, mild diaphragm mm release in supine (finger tips under ribs)    Rationale: Improve thoracic mobility for lung expansion and improved breathing             With  TE/NM Patient Education: [x] Review HEP, edu on progress made and intent of activity progressions. Other Objective/Functional Measures:    Subjective: Patient reports 60% improvements in sx since [facility]. Pain levels range from 3 to 7. Patient's current complaints: L>R sided rib/mid thoracic pain, tightness/pressure in chest with breathing at tip of sternum. Patient performing HEP : daily . Objective: Strength  BUE: grossly 4+/5 w/ exception of rhomboids at 4-/5  BLE: grossly 4+/5 w/ exception of hip ext R: 3/5, L: 3+/5  Breathin-2sec max inhalation, expiration ~3sec. With pain at lower sternum with deep inhalation. AROM: t/s : R/L rot: 75%/60-70% p! In rib/periscapular area~T6 L>R,   SB R/L: 60%/60% less pain than rotation.   TTP: with PA glides over T7-T9 area, rib costochondral  mobes L>R at T7-~T9, increased L SCM /scalene mm tone/tightness and Trp causing dizziness  Improvements: endurance, pain at sternum with deep inspiration,, decreased vital capacity, pain in mid t/s (deep), breathing improved, less pain frequency, less intense pain, quick smoking, able to lie supine  Deficits: decreased en  FOTO: 48/100    Progress towards goals / Updated goals: · Short Term Goals: To be accomplished in  2  weeks:   1.  Pt will be compliant w/ HEP 5/7 days a week to manage S&S.   Status at last assessment issued HEP  Current: progressing,2x/day per w/ breathing awareness for all activity      2.   Pt will demo a 2:4 ration of inhale/exhale breathing pattern in order to improve rib excursion and allow greater rib mobility/less pain w/ functional movement. Status at last assessment 1:2  Current: progressing, 4/26/22: cont 1:3 sec w/ greater improved coordinated exhalation     · Long Term Goals: To be accomplished in  6  weeks:  1.  Pt will have improved l/s extension AROM to 15 deg to allow for more erect posture and reduced l/s strain during ambulation/standing tasks.   Status at last assessment neutral: exhibits 15 d trunk flex in standing  Current: goal progressing to approx 25% 4/20/2022     2. Pt will have improved bridge ability to full range in order to allow for greater ease of transitions in bed.   Status at last assessment limited 50%  Current: 4/28/22: performed in supine position:  difficulty coordinating breathing patterns, 60% 5/10/22     3. Pt will have improved FOTO score to predicted value of 57/100 in order to indicate improved tolerance to functional activity.   Status at last assessment 43/100, 48/100 5/10/22     4. Pt will have increased rib excursion to 1.5 inches in order to indicate better ability to take deep breaths, to allow for greater air flow and decreased effort of breathing w/ functional activity.   Status at last assessment 1/2 inch  Current: 4/28/22: 3/4 inch, reduced back to 1/2 inch 5/10/22      Pain Level (0-10 scale) post treatment:  7 /10    ASSESSMENT/Changes in Function:   See PN.     Patient will continue to benefit from skilled PT services to modify and progress therapeutic interventions, address functional mobility deficits, address ROM deficits, address strength deficits, analyze and address soft tissue restrictions, analyze and cue movement patterns, analyze and modify body mechanics/ergonomics, assess and modify postural abnormalities and address imbalance/dizziness to attain remaining goals. [x]  See Plan of Care  []  See progress note/recertification  []  See Discharge Summary           PLAN  [x]  Upgrade activities as tolerated     [x]  Continue plan of care  []  Update interventions per flow sheet       []  Discharge due to:_  [x]  Other:_Pt to continue 1-2x/wk for 4-6 treatments.          Lopez Corbett, PT 5/10/2022     Future Appointments   Date Time Provider [place]   5/10/2022  9:15 AM [facility] [place] [facility]   5/12/2022  9:15 AM [facility] PT [place] 2 [facility]   6/21/2022 10:45 AM Rolanda Jarrell MD AMA BS AMB

## 2022-05-10 NOTE — PROGRESS NOTES
[address] [facility] [place] [address] [facility] [address]  Phone: [phone] Fax: [phone]  PROGRESS NOTE  Patient Name: Parul Dominguez : 1975   Treatment/Medical Diagnosis: Upper back pain [M54.9]  Other low back pain [M54.59]   Referral Source: Luz Elena Cedillo MD     Date of Initial Visit: 22 Attended Visits: 9 Missed Visits: 0     SUMMARY OF TREATMENT  Treatment consist of therapeutic exercise including ROM, stretching, gentle strengthening, breathing training, postural ed, patient education, HEP instruction,CP, and manual therapy. CURRENT STATUS  Subjective: Patient reports 60% improvements in sx since [facility]. Pain levels range from 3 to 7. Patient's current complaints: L>R sided rib/mid thoracic pain, tightness/pressure in chest with breathing at tip of sternum. Patient performing HEP : daily . Objective: Strength  BUE: grossly 4+/5 w/ exception of rhomboids at 4-/5  BLE: grossly 4+/5 w/ exception of hip ext R: 3/5, L: 3+/5  Breathin-2sec max inhalation, expiration ~3sec. With pain at lower sternum with deep inhalation. AROM: t/s : R/L rot: 75%/60-70% p! In rib/periscapular area~T6 L>R,   SB R/L: 60%/60% less pain than rotation. TTP: with PA glides over T7-T9 area, rib costochondral  mobes L>R at T7-~T9, increased L SCM /scalene mm tone/tightness and Trp causing dizziness  Improvements: endurance, pain at sternum with deep inspiration,, decreased vital capacity, pain in mid t/s (deep), breathing improved, less pain frequency, less intense pain, quick smoking, able to lie supine  Deficits: decreased en  FOTO: 48/100    Progress towards goals / Updated goals:     · Short Term Goals: To be accomplished in  2  weeks:   1.  Pt will be compliant w/ HEP 5/7 days a week to manage S&S.   Status at last assessment issued HEP  Current: progressing,2x/day per w/ breathing awareness for all activity      2.   Pt will demo a 2:4 ration of inhale/exhale breathing pattern in order to improve rib excursion and allow greater rib mobility/less pain w/ functional movement. Status at last assessment 1:2  Current: progressing, 4/26/22: cont 1:3 sec w/ greater improved coordinated exhalation     · Long Term Goals: To be accomplished in  6  weeks:  1.  Pt will have improved l/s extension AROM to 15 deg to allow for more erect posture and reduced l/s strain during ambulation/standing tasks.   Status at last assessment neutral: exhibits 15 d trunk flex in standing  Current: goal progressing to approx 25% 4/20/2022     2. Pt will have improved bridge ability to full range in order to allow for greater ease of transitions in bed.   Status at last assessment limited 50%  Current: 4/28/22: performed in supine position:  difficulty coordinating breathing patterns, 60% 5/10/22     3. Pt will have improved FOTO score to predicted value of 57/100 in order to indicate improved tolerance to functional activity.   Status at last assessment 43/100, 48/100 5/10/22     4. Pt will have increased rib excursion to 1.5 inches in order to indicate better ability to take deep breaths, to allow for greater air flow and decreased effort of breathing w/ functional activity.   Status at last assessment 1/2 inch  Current: 4/28/22: 3/4 inch, reduced back to 1/2 inch 5/10/22    New Goals to be achieved in _4-6__  treatments:  Continue with above goals and add:  5. Patient to report decreased dizziness by 50% indicating improved mm tone in c/s musculature. RECOMMENDATIONS  Pt to continue 1-2x/wk for 4-6 treatments. If you have any questions/comments please contact us directly at [phone]   Thank you for allowing us to assist in the care of your patient.   Therapist Signature: Sarah Martinez PT Date: 5/10/2022   Reporting Period  4/5/22-5/10/22 Time: 12:18 PM   NOTE TO PHYSICIAN:  PLEASE COMPLETE THE ORDERS BELOW AND FAX TO   [facility]: [phone]. If you are unable to process this request in 24 hours please contact our office: [phone].  ___ I have read the above report and request that my patient continue as recommended.   ___ I have read the above report and request that my patient continue therapy with the following changes/special instructions:_________________________________________________________   ___ I have read the above report and request that my patient be discharged from therapy.      Physician Signature:        Date:       Time:                                                        Luz Elena Cedillo MD.

## 2022-05-12 ENCOUNTER — APPOINTMENT (OUTPATIENT)
Dept: PHYSICAL THERAPY | Age: 47
End: 2022-05-12
Payer: MEDICARE

## 2022-05-19 ENCOUNTER — PATIENT OUTREACH (OUTPATIENT)
Dept: CASE MANAGEMENT | Age: 47
End: 2022-05-19

## 2022-05-19 NOTE — PROGRESS NOTES
.Complex Case Management  Follow-Up       Date/Time:   5/19/22  3:55 PM       Ambulatory Care Manager ( ACM) contacted patient for Complex Case Management  follow up. Patient returning ACM call from earlier. Patient reported:   Patient voicing she has not smoked since May 4,2022. She is the first in the research that's stopped smoking so far. Patient is not taking Wellbutrin since start of clinical trial medications ( 4/28/2022) The medication for the clinical trial RCA-3 Cytisinicline, so far the only side effect is ,it makes her sleepy. Patient did not get Trazodone script.don't feel she needs it now to sleep. Also is afraid it may interact with the clinica trail medications. Patient voicing she is being seen every Thursday thru out the trial . They have her blowing in a tube checking carbon dioxide also. Pertinent concerns:  Menmoniose she has been having her menses every 3 to 4 months. She ment to tell Dr Christina Velasquezes at her last appointment she beleive she is due for her pap this year since they told her every 2 years. Specialist appointments since last outreach? No But in Clinical Trial for smoking secession    If so, specialist and date: Every thursday    Next PCP Appointment: 6/21/22    Medications:     New medications since last outreach: RCA- 3 Cytisinicline   Does patient need refills on any medications: No   Medication changes since last outreach (dose adjustments or discontinued meds): Patient stopped Wellbutrin and did not  Trazodone       [address]: No    Barriers to care? No        Patient reminded that there are physicians on call 24 hours a day / 7 days a week (M-F 5pm to 8am and from Friday 5pm until Monday 8a for the weekend on call doctor available) should the patient have questions or concerns.

## 2022-05-19 NOTE — PROGRESS NOTES
.Complex Case Management  Follow-Up       Date/Time:   5/19/22 1:58 PM       Ambulatory Care Manager ( ACM) contacted patient for Complex Case Management  follow up. Left call back number for return call     Noted :  No ED or hospital admissions  Noted cancellations and rescheduling of PT sessions. Unable to ascertain if she has started Smoking secession clinical trails yet [facility] Specialist /ED visited since last outreach? Only out patient Physical Therapy   If so, specialist / ED and date: Outpatient PT    Next PCP Appointment: 6/21/22    Medications:     New medications since last outreach: None noted  Medication changes since last outreach (dose adjustments or discontinued meds): No         Patient reminded that there are physicians on call 24 hours a day / 7 days a week . Call Jennifer Reno. [address] [place] [address] [phone] Mon-Fri 8:00-4:30.

## 2022-05-24 ENCOUNTER — HOSPITAL ENCOUNTER (OUTPATIENT)
Dept: PHYSICAL THERAPY | Age: 47
Discharge: HOME | End: 2022-05-24
Payer: MEDICARE

## 2022-05-24 PROCEDURE — 97140 MANUAL THERAPY 1/> REGIONS: CPT

## 2022-05-24 PROCEDURE — 97110 THERAPEUTIC EXERCISES: CPT

## 2022-05-24 PROCEDURE — 97112 NEUROMUSCULAR REEDUCATION: CPT

## 2022-05-24 NOTE — PROGRESS NOTES
PT DAILY TREATMENT NOTE     Patient Name: Ravindra Freeze  Date:2022  : 1975  [x]  Patient  Verified  Payor: Lowell Cisneros / Plan: [address] [place] [address] HMO / Product Type: Managed Care Medicare /    In time: 7:04 am       Out time: 8:07 am  Total Treatment Time (min): 63  Visit #: 1 of 4-6    Medicare/BCBS Only   Total Timed Codes (min): 53 1:1 Treatment Time: 53       Treatment Area: Upper back pain [M54.9]  Other low back pain [M54.59]    SUBJECTIVE  Pain Level (0-10 scale): 8  Any medication changes, allergies to medications, adverse drug reactions, diagnosis change, or new procedure performed?: [x] No    [] Yes (see summary sheet for update)  Subjective functional status/changes:   [] No changes reported  Patient notes she feels the pain begins at her chest and goes through her body to her back. She states she took in a new job which seems to help reduce the pain. OBJECTIVE    Modality rationale: decrease pain and increase tissue extensibility to improve the patients ability to perform ADLs and rest comfortably following therapy.     Min Type Additional Details    [] Estim:  []Unatt       []IFC  []Premod                        []Other:  []w/ice   []w/heat  Position:   Location:     [] Estim: []Att    []TENS instruct  []NMES                    []Other:  []w/US   []w/ice   []w/heat  Position:   Location:     []  Traction: [] Cervical       []Lumbar                       [] Prone          []Supine                       []Intermittent   []Continuous Lbs:  [] before manual  [] after manual    []  Ultrasound: []Continuous   [] Pulsed                           []1MHz   []3MHz W/cm2:  Location:    []  Iontophoresis with dexamethasone         Location: [] Take home patch   [] In clinic   10 [x]  Ice     []  heat  [] Position: semi-reclined with wedge under LEs  Location: (B) rectus abdominus   [x] Skin assessment post-treatment:  [x]intact []redness- no adverse reaction    []redness - adverse reaction:     19 min Therapeutic Exercise:  [x] See flow sheet: initiated PPU (poor tolerance)   Rationale: increase ROM and increase strength to improve LE strength/mobility and  lumbar mobility to improve ease of ADLs and gait. 26 min Neuromuscular Re-education:  [x]  See flow sheet: added 1/2 FR goblet squats, standing resisted punches, added hook-lying SB assisted crunches   Rationale: increase strength, improve coordination, improve balance and increase proprioception  to improve the patients ability to perform functional tasks with improved abdominal brace utilization, improved control, and decreased risk for falls. 8 min Manual Therapy: supine TrP release to the upper section of the rectus abdominus (B) and the upper 1/2 of the lower section of the rectus abdominus (B)   Rationale: Improve thoracic mobility for lung expansion and improved breathing           With  TE/NM Patient Education: [x] Review HEP     Other Objective/Functional Measures:  L/S extension with PPU: approx 75% AROM    Pain Level (0-10 scale) post treatment: 3    ASSESSMENT/Changes in Function:   Patient with improved tolerance to isometric/concentric activation of the rectus abdominus as opposed to passive stretching to reduce middle and lower back pain. (+) TTP at the upper segment and lower segments of the rectus abdominus (L)>(R) with patient's referred pain. Patient demos difficulty with proper abdominal engagement during all punching attempts. Patient will continue to benefit from skilled PT services to modify and progress therapeutic interventions, address functional mobility deficits, address ROM deficits, address strength deficits, analyze and address soft tissue restrictions, analyze and cue movement patterns, analyze and modify body mechanics/ergonomics, assess and modify postural abnormalities and address imbalance/dizziness to attain remaining goals. Progress towards goals / Updated goals:     · Short Term Goals: To be accomplished in  2  weeks:   1.  Pt will be compliant w/ HEP 5/7 days a week to manage S&S.   Status at last assessment issued HEP  Current: progressing,2x/day per w/ breathing awareness for all activity      2.   Pt will demo a 2:4 ration of inhale/exhale breathing pattern in order to improve rib excursion and allow greater rib mobility/less pain w/ functional movement. Status at last assessment 1:2  Current: progressing, 4/26/22: cont 1:3 sec w/ greater improved coordinated exhalation     · Long Term Goals: To be accomplished in  6  weeks:  1.  Pt will have improved l/s extension AROM to 15 deg to allow for more erect posture and reduced l/s strain during ambulation/standing tasks.   Status at last assessment neutral: exhibits 15 d trunk flex in standing  Current: goal progressing to approx 25% 4/20/2022     2. Pt will have improved bridge ability to full range in order to allow for greater ease of transitions in bed.   Status at last assessment limited 50%  Current: 4/28/22: performed in supine position:  difficulty coordinating breathing patterns, 60% 5/10/22     3. Pt will have improved FOTO score to predicted value of 57/100 in order to indicate improved tolerance to functional activity.   Status at last assessment 43/100, 48/100 5/10/22     4. Pt will have increased rib excursion to 1.5 inches in order to indicate better ability to take deep breaths, to allow for greater air flow and decreased effort of breathing w/ functional activity.   Status at last assessment 1/2 inch  Current: 4/28/22: 3/4 inch, reduced back to 1/2 inch 5/10/22     5. Patient to report decreased dizziness by 50% indicating improved mm tone in c/s musculature.        PLAN  [x]  Upgrade activities as tolerated     [x]  Continue plan of care  []  Update interventions per flow sheet       []  Discharge due to:_  [x]  Other: pt to continue 1-2x/wk for 4-6 treatments; continue core strengthening         Lei Puga RENA 5/24/2022     Future Appointments   Date Time Provider Jina Catesi   5/31/2022  7:00 AM Josh Flores, RENA [facility]   6/7/2022  7:00 AM Josh Flores, RENA [facility]   6/14/2022  7:00 AM Josh Flores, RENA [facility]   6/21/2022 10:45 AM Klaudia Gann MD AMA [facility]

## 2022-05-31 ENCOUNTER — HOSPITAL ENCOUNTER (OUTPATIENT)
Dept: PHYSICAL THERAPY | Age: 47
Discharge: HOME | End: 2022-05-31
Payer: MEDICARE

## 2022-05-31 PROCEDURE — 97110 THERAPEUTIC EXERCISES: CPT

## 2022-05-31 PROCEDURE — 97112 NEUROMUSCULAR REEDUCATION: CPT

## 2022-05-31 PROCEDURE — 97140 MANUAL THERAPY 1/> REGIONS: CPT

## 2022-05-31 NOTE — PROGRESS NOTES
PT DAILY TREATMENT NOTE     Patient Name: Ravindra Freeze  Date:2022  : 1975  [x]  Patient  Verified  Payor: Lowell Cisneros / Plan: [address] [place] [address] HMO / Product Type: Managed Care Medicare /    In time: 7:00 am      Out time: 7:39 am  Total Treatment Time (min): 39  Visit #: 2 of 4-6    Medicare/BCBS Only   Total Timed Codes (min): 39 1:1 Treatment Time: 39       Treatment Area: Upper back pain [M54.9]  Other low back pain [M54.59]    SUBJECTIVE  Pain Level (0-10 scale): 6 in mid-T/S; 1 in L/S  Any medication changes, allergies to medications, adverse drug reactions, diagnosis change, or new procedure performed?: [x] No    [] Yes (see summary sheet for update)  Subjective functional status/changes:   [] No changes reported  Patient notes less back pain since last session, although 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 Patient requests abbreviated session to tend to her ex- during his scheduled surgery this morning. OBJECTIVE    Modality rationale: decrease pain and increase tissue extensibility to improve the patients ability to perform ADLs and rest comfortably following therapy.     Min Type Additional Details    [] Estim:  []Unatt       []IFC  []Premod                        []Other:  []w/ice   []w/heat  Position:   Location:     [] Estim: []Att    []TENS instruct  []NMES                    []Other:  []w/US   []w/ice   []w/heat  Position:   Location:     []  Traction: [] Cervical       []Lumbar                       [] Prone          []Supine                       []Intermittent   []Continuous Lbs:  [] before manual  [] after manual    []  Ultrasound: []Continuous   [] Pulsed                           []1MHz   []3MHz W/cm2:  Location:    []  Iontophoresis with dexamethasone         Location: [] Take home patch   [] In clinic   TC []  Ice     []  heat  [] Position:   Location:    [] Skin assessment post-treatment:  []intact []redness- no adverse reaction    []redness - adverse reaction:     14 min Therapeutic Exercise:  [x] See flow sheet:   Rationale: increase ROM and increase strength to improve LE strength/mobility and  lumbar mobility to improve ease of ADLs and gait. 17 min Neuromuscular Re-education:  [x]  See flow sheet: added chest breathing in quadriped (gravity-assisted); SB LTR   Rationale: increase strength, improve coordination, improve balance and increase proprioception  to improve the patients ability to perform functional tasks with improved abdominal brace utilization, improved control, and decreased risk for falls. 8 min Manual Therapy: supine TrP release to the upper section of the rectus abdominus (B) and the upper 1/2 of the lower section of the rectus abdominus (B); f/b PA mobs to the (L) costochondral region of ribs 4 and 5 with non-painful cavitation present   Rationale: Improve thoracic mobility for lung expansion and improved breathing           With  TE/NM Patient Education: [x] Review HEP     Other Objective/Functional Measures:  L/S extension AROM: 30 deg    Pain Level (0-10 scale) post treatment: 4 in mid-back; 0-1 in low back    ASSESSMENT/Changes in Function:   Patient notes significant pain relief since last session, especially in the low back with gradual return following long work days. (+) for TTP at the (L)>(R) upper segment of the rectus abdominus which responds well to TrP release; easy cavitation with grade III mobs to the costochondral region of ribs 4-5. Possible recurrent pectoral overuse due to decreased periscapular strength for patient's new work duties responsible for current symptoms.     Patient will continue to benefit from skilled PT services to modify and progress therapeutic interventions, address functional mobility deficits, address ROM deficits, address strength deficits, analyze and address soft tissue restrictions, analyze and cue movement patterns, analyze and modify body mechanics/ergonomics, assess and modify postural abnormalities and address imbalance/dizziness to attain remaining goals. Progress towards goals / Updated goals: · Short Term Goals: To be accomplished in  2  weeks:   1.  Pt will be compliant w/ HEP 5/7 days a week to manage S&S.   Status at last assessment issued HEP  Current: progressing,2x/day per w/ breathing awareness for all activity      2.   Pt will demo a 2:4 ration of inhale/exhale breathing pattern in order to improve rib excursion and allow greater rib mobility/less pain w/ functional movement. Status at last assessment 1:2  Current: progressing, 4/26/22: cont 1:3 sec w/ greater improved coordinated exhalation     · Long Term Goals: To be accomplished in  6  weeks:  1.  Pt will have improved l/s extension AROM to 15 deg to allow for more erect posture and reduced l/s strain during ambulation/standing tasks.   Status at last assessment neutral: exhibits 15 d trunk flex in standing  Current: goal met; 30 deg L/S extension (5/31/22)     2. Pt will have improved bridge ability to full range in order to allow for greater ease of transitions in bed.   Status at last assessment limited 50%  Current: 4/28/22: performed in supine position:  difficulty coordinating breathing patterns, 60% 5/10/22      3. Pt will have improved FOTO score to predicted value of 57/100 in order to indicate improved tolerance to functional activity.   Status at last assessment 43/100, 48/100 (5/10/22)     4. Pt will have increased rib excursion to 1.5 inches in order to indicate better ability to take deep breaths, to allow for greater air flow and decreased effort of breathing w/ functional activity.   Status at last assessment 1/2 inch  Current: 4/28/22: 3/4 inch, reduced back to 1/2 inch (5/10/22)     5. Patient to report decreased dizziness by 50% indicating improved mm tone in c/s musculature.         PLAN  [x]  Upgrade activities as tolerated     [x]  Continue plan of care  []  Update interventions per flow sheet       []  Discharge due to:_  [x]  Other: consider wall SA slides and/or boxes NV; reassess goals NV for PN        Sivan Nobles PTA 5/31/2022     Future Appointments   Date Time Provider Jina South   5/31/2022  7:00 AM Rosiland Seeds, PTA [facility]   6/7/2022  7:00 AM Rosiland Seeds, PTA [facility]   6/14/2022  7:00 AM Rosiland Seeds, PTA [facility]   6/21/2022 10:45 AM Javier Hurst MD AMA BS AM[facility]

## 2022-06-07 ENCOUNTER — HOSPITAL ENCOUNTER (OUTPATIENT)
Dept: PHYSICAL THERAPY | Age: 47
Discharge: HOME | End: 2022-06-07
Payer: MEDICARE

## 2022-06-07 PROCEDURE — 97110 THERAPEUTIC EXERCISES: CPT

## 2022-06-07 PROCEDURE — 97140 MANUAL THERAPY 1/> REGIONS: CPT

## 2022-06-07 PROCEDURE — 97112 NEUROMUSCULAR REEDUCATION: CPT

## 2022-06-07 NOTE — PROGRESS NOTES
PT DAILY TREATMENT NOTE     Patient Name: Joselito Salinas  Date:2022  : 1975  [x]  Patient  Verified  Payor: Otilia Choi / Plan: [address] [place] [address] HMO / Product Type: Managed Care Medicare /    In time: 7:08 am         Out time: 8:09 am  Total Treatment Time (min): 61  Visit #: 3 of 4-6    Medicare/BCBS Only   Total Timed Codes (min): 51 1:1 Treatment Time: 47       Treatment Area: Upper back pain [M54.9]  Other low back pain [M54.59]    SUBJECTIVE  Pain Level (0-10 scale): 8 in mid-T/S  Any medication changes, allergies to medications, adverse drug reactions, diagnosis change, or new procedure performed?: [x] No    [] Yes (see summary sheet for update)  Subjective functional status/changes:   [] No changes reported  Patient reports having increased stress due to death of her ex- and is unsure if she is having increased pain due to crying a lot. She reports pain relief following last session, which returned after working. OBJECTIVE  Modality rationale: decrease pain to improve the patients ability to perform ADLs and rest comfortably following therapy.     Min Type Additional Details   10 [x] Estim:  [x]Unatt       [x]IFC  []Premod                        []Other:  [x]w/ice   []w/heat  Position: reclined  Location: T/S and (B) UT    [] Estim: []Att    []TENS instruct  []NMES                    []Other:  []w/US   []w/ice   []w/heat  Position:   Location:     []  Traction: [] Cervical       []Lumbar                       [] Prone          []Supine                       []Intermittent   []Continuous Lbs:  [] before manual  [] after manual    []  Ultrasound: []Continuous   [] Pulsed                           []1MHz   []3MHz W/cm2:  Location:    []  Iontophoresis with dexamethasone         Location: [] Take home patch   [] In clinic    []  Ice     []  heat  [] Position:   Location:    [x] Skin assessment post-treatment:  [x]intact []redness- no adverse reaction    []redness - adverse reaction:     20 min Therapeutic Exercise:  [x] See flow sheet: added BOSU forward lunges, 2x4 decline squats   Rationale: increase ROM and increase strength to improve LE strength/mobility and  lumbar mobility to improve ease of ADLs and gait. 19 min Neuromuscular Re-education:  [x]  See flow sheet: added cat/camels, thread the needles   Rationale: increase strength, improve coordination, improve balance and increase proprioception  to improve the patients ability to perform functional tasks with improved abdominal brace utilization, improved control, and decreased risk for falls. 12 min Manual Therapy: prone PA mobs grade III to T/S (significant hypomobility) f/b supine PA mobs to the (L) costochondral region of ribs 4 and 5 (good spring, held after two repetitions); massage gun STM to (R)>(L) UT in sitting   Rationale: Improve thoracic mobility for lung expansion and improved breathing           With  TE/NM Patient Education: [x] Review HEP     Other Objective/Functional Measures:      Pain Level (0-10 scale) post treatment: 4 in T/S    ASSESSMENT/Changes in Function:   Patient with significant thoracic hypomobility, therefore, introduced quadriped interventions to bias the thoracic spine. Improved chest wall spring with PA mobs, although patient continues to demonstrate difficulty with segmental breathing, possibly due to hx of COVID19 illness. Patient will continue to benefit from skilled PT services to modify and progress therapeutic interventions, address functional mobility deficits, address ROM deficits, address strength deficits, analyze and address soft tissue restrictions, analyze and cue movement patterns, analyze and modify body mechanics/ergonomics, assess and modify postural abnormalities and address imbalance/dizziness to attain remaining goals. Progress towards goals / Updated goals:     · Short Term Goals: To be accomplished in  2  weeks:   1.  Pt will be compliant w/ HEP 5/7 days a week to manage S&S.   Status at last assessment issued HEP  Current: progressing,2x/day per w/ breathing awareness for all activity      2.   Pt will demo a 2:4 ration of inhale/exhale breathing pattern in order to improve rib excursion and allow greater rib mobility/less pain w/ functional movement. Status at last assessment 1:2  Current: progressing, 4/26/22: cont 1:3 sec w/ greater improved coordinated exhalation     · Long Term Goals: To be accomplished in  6  weeks:  1.  Pt will have improved l/s extension AROM to 15 deg to allow for more erect posture and reduced l/s strain during ambulation/standing tasks.   Status at last assessment neutral: exhibits 15 d trunk flex in standing  Current: goal met; 30 deg L/S extension (5/31/22)     2. Pt will have improved bridge ability to full range in order to allow for greater ease of transitions in bed.   Status at last assessment limited 50%  Current: 4/28/22: performed in supine position:  difficulty coordinating breathing patterns, 60% 5/10/22      3. Pt will have improved FOTO score to predicted value of 57/100 in order to indicate improved tolerance to functional activity.   Status at last assessment 43/100, 48/100 (5/10/22)     4. Pt will have increased rib excursion to 1.5 inches in order to indicate better ability to take deep breaths, to allow for greater air flow and decreased effort of breathing w/ functional activity.   Status at last assessment 1/2 inch  Current: 4/28/22: 3/4 inch, reduced back to 1/2 inch (5/10/22)     5. Patient to report decreased dizziness by 50% indicating improved mm tone in c/s musculature.   -Goal progressing; pt notes continued dizziness with is intermittent (6/7/22)      PLAN  [x]  Upgrade activities as tolerated     [x]  Continue plan of care  []  Update interventions per flow sheet       []  Discharge due to:_  [x]  Other: consider wall SA slides and/or boxes NV; reassess goals NV for FINA Kerr PTA 6/7/2022     Future Appointments   Date Time Provider Jina Akosua   6/14/2022  7:00 AM Vergil Sandifer, PTA [facility]   6/21/2022 10:45 AM Fabiola Huerta MD AMA BS AMB

## 2022-06-14 ENCOUNTER — HOSPITAL ENCOUNTER (OUTPATIENT)
Dept: PHYSICAL THERAPY | Age: 47
Discharge: HOME | End: 2022-06-14
Payer: MEDICARE

## 2022-06-14 PROCEDURE — 97110 THERAPEUTIC EXERCISES: CPT

## 2022-06-14 PROCEDURE — 97112 NEUROMUSCULAR REEDUCATION: CPT

## 2022-06-14 NOTE — PROGRESS NOTES
[facility] AT [address]  Ramirez Brandt  Phone: [phone] Fax: [phone]  PROGRESS NOTE  Patient Name: Phyllis Schulz : 1975   Treatment/Medical Diagnosis: Upper back pain [M54.9]  Other low back pain [M54.59]   Referral Source: Madeline Hernandez MD     Date of Initial Visit: 22 Attended Visits: 13 Missed Visits: 0     SUMMARY OF TREATMENT  Pt is a 52 yo female that presents to PT today w/ chief complaint of back pain (mid>lower back pain) that is chronic in nature and has worsened over the last year after she had COVID. Pt S&S are consistent w/ OA however it is also noted that pt has difficulty w/ deep breathing. Treatment consist of therapeutic exercise including ROM, stretching, gentle strengthening, breathing training, postural ed, patient education, HEP instruction,CP, and manual therapy. 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 Patient states feeling like she can breathe deeper since last progress note, possibly due to improved rib excursion, and therefore notes a minor improvement in activity tolerance.  Assessment as follows:     HEP: fair compliance recently due to death in family  Improvements: improved breathing and chest wall expansion, general ADLs, return to work cleaning 20+ floors, improved squatting/transfers, reduce frequency and intensity of dizziness due to activity modification  Deficits: heavier lifting, increased stiffness at end of workday, continued dizziness when lying on the left side  FOTO score: 52/100 (at last assessment, 43/100)  L/S AROM (%): flex fingertips to ankles, ext 30 deg, (R)SB fingertips at 5 inches above apex of patella, (L)SB fingertips at 3 inches above apex of patella, (R)rot 50%, (L)rot 75%  Hip strength: flex 5/5 bilaterally, abd (R) 4+/5 (L) 4-/5, ext (R) 4+/5 (L) 4-/5, ER/IR not tested  Core strength: 50% bridge  Scapular strength: MT 4/5, LT <3/5  Pain: (B) 0, (W) 8    Goal/Measure of Progress      1.  Pt will have improved l/s extension AROM to 15 deg to allow for more erect posture and reduced L/S strain during ambulation/standing tasks.   Status at last assessment neutral: exhibits 15 d trunk flex in standing  Current: goal met; 30 deg L/S extension (22)     2. Pt will have improved bridge ability to full range in order to allow for greater ease of transitions in bed.   Status at last assessment limited 50%  Current: goal progressing; limited 50%     3. Pt will have improved FOTO score to predicted value of 57/100 in order to indicate improved tolerance to functional activity.   Status at last assessment 48/100  Current: goal progressing; 52/100     4. Pt will have increased rib excursion to 1.5 inches in order to indicate better ability to take deep breaths, to allow for greater air flow and decreased effort of breathing w/ functional activity.   Status at last assessment 1/2 inch  Current: goal progressing; 1.0 inch     5.   Patient to report decreased dizziness by 50% indicating improved mm tone in c/s musculature. -Goal progressing; pt notes continued dizziness when lying on left side; defers BPPV testing due to intent to attend late 's /wake tomorrow    New Goals to be achieved in __6-7__  treatments:  Continue per established goals #2-5, with addition of the followin. Patient will increase MT strength to >/= 4+/5 to improve postural strength for sweeping/mopping. RECOMMENDATIONS  PT recommends cont POC 1-2x/wk x 6-7 more treatment sessions to address remaining deficits. If you have any questions/comments please contact us directly at [phone]. Thank you for allowing us to assist in the care of your patient. LPTA Signature: Uriel Cheek [place]  Date: 2022   PT Signature:  Zoe Raul, PT, DPT, CMPT Time: 12:53 PM   NOTE TO PHYSICIAN:  PLEASE COMPLETE THE ORDERS BELOW AND FAX TO   [facility]: [phone]. If you are unable to process this request in 24 hours please contact our office: [phone].  ___ I have read the above report and request that my patient continue as recommended.   ___ I have read the above report and request that my patient continue therapy with the following changes/special instructions:_________________________________________________________   ___ I have read the above report and request that my patient be discharged from therapy.      Physician Signature:        Date:       Time:   Reporting Period: 4/5/22-6/14/22

## 2022-06-14 NOTE — PROGRESS NOTES
PT DAILY TREATMENT NOTE     Patient Name: Angela Ward  Date:2022  : 1975  [x]  Patient  Verified  Payor: Hung Candelariobhanu / Plan: [address] [place] [address] HMO / Product Type: Managed Care Medicare /    In time: 7:00 am         Out time: 7:34 am  Total Treatment Time (min): 34  Visit #: 4 of 4-6    Medicare/BCBS Only   Total Timed Codes (min): 34 1:1 Treatment Time: 30       Treatment Area: Upper back pain [M54.9]  Other low back pain [M54.59]    SUBJECTIVE  Pain Level (0-10 scale): 8 in mid-T/S  Any medication changes, allergies to medications, adverse drug reactions, diagnosis change, or new procedure performed?: [x] No    [] Yes (see summary sheet for update)  Subjective functional status/changes:   [] No changes reported  Patient reports continued increased pain due to being busy planning for ex-husbands wake/. \"My last few weeks, it feels like I have been working better than (before). I have to leave early today. How long are we gonna take? \"    OBJECTIVE  Modality rationale: decrease pain to improve the patients ability to perform ADLs and rest comfortably following therapy.     Min Type Additional Details   PD / TC [] Estim:  []Unatt       []IFC  []Premod                        []Other:  []w/ice   []w/heat  Position:   Location:     [] Estim: []Att    []TENS instruct  []NMES                    []Other:  []w/US   []w/ice   []w/heat  Position:   Location:     []  Traction: [] Cervical       []Lumbar                       [] Prone          []Supine                       []Intermittent   []Continuous Lbs:  [] before manual  [] after manual    []  Ultrasound: []Continuous   [] Pulsed                           []1MHz   []3MHz W/cm2:  Location:    []  Iontophoresis with dexamethasone         Location: [] Take home patch   [] In clinic    []  Ice     []  heat  [] Position:   Location:    [] Skin assessment post-treatment:  []intact []redness- no adverse reaction    []redness - adverse reaction:     14 min Therapeutic Exercise:  [x] See flow sheet: assisted patient with FOTO completion; reassessment   Rationale: increase ROM and increase strength to improve LE strength/mobility and  lumbar mobility to improve ease of ADLs and gait. 17 min Neuromuscular Re-education:  [x]  See flow sheet:    Rationale: increase strength, improve coordination, improve balance and increase proprioception  to improve the patients ability to perform functional tasks with improved abdominal brace utilization, improved control, and decreased risk for falls. 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, improved squatting/transfers, reduce frequency and intensity of dizziness due to activity modification  Deficits: heavier lifting, increased stiffness at end of workday, continued dizziness when lying on the left side  FOTO score: 52/100 (at last assessment, 43/100)  L/S AROM (%): flex fingertips to ankles, ext 30 deg, (R)SB fingertips at 5 inches above apex of patella, (L)SB fingertips at 3 inches above apex of patella, (R)rot 50%, (L)rot 75%  Hip strength: flex 5/5 bilaterally, abd (R) 4+/5 (L) 4-/5, ext (R) 4+/5 (L) 4-/5, ER/IR not tested  Core strength: 50% bridge  Scapular strength: MT 4/5, LT <3/5  Pain: (B) 0, (W) 8      Pain Level (0-10 scale) post treatment: 3    ASSESSMENT/Changes in Function:   See PN.     Patient will continue to benefit from skilled PT services to modify and progress therapeutic interventions, address functional mobility deficits, address ROM deficits, address strength deficits, analyze and address soft tissue restrictions, analyze and cue movement patterns, analyze and modify body mechanics/ergonomics, assess and modify postural abnormalities and address imbalance/dizziness to attain remaining goals. Progress towards goals / Updated goals:      1.  Pt will have improved l/s extension AROM to 15 deg to allow for more erect posture and reduced L/S strain during ambulation/standing tasks.   Status at last assessment neutral: exhibits 15 d trunk flex in standing  Current: goal met; 30 deg L/S extension (22)     2. Pt will have improved bridge ability to full range in order to allow for greater ease of transitions in bed.   Status at last assessment limited 50%  Current: goal progressing; limited 50%     3. Pt will have improved FOTO score to predicted value of 57/100 in order to indicate improved tolerance to functional activity.   Status at last assessment 48/100  Current: goal progressing; 52/100     4. Pt will have increased rib excursion to 1.5 inches in order to indicate better ability to take deep breaths, to allow for greater air flow and decreased effort of breathing w/ functional activity.   Status at last assessment 1/2 inch  Current: goal progressing; 1.0 inch     5. Patient to report decreased dizziness by 50% indicating improved mm tone in c/s musculature.   -Goal progressing; pt notes continued dizziness when lying on left side; defers BPPV testing due to intent to attend late 's /wake tomorrow    PLAN  [x]  Upgrade activities as tolerated     [x]  Continue plan of care  []  Update interventions per flow sheet       []  Discharge due to:_  [x]  Other: consider wall SA slides and/or boxes NV; see PN; cont for remaining txs        Mike Hancock PTA 2022     Future Appointments   Date Time Provider [facility]   2022  7:00 AM Spike Mckeon PTA [facility] 6/21/2022 10:45 AM Lesa Hernandez MD AMA BS AMB

## 2022-06-21 ENCOUNTER — OFFICE VISIT (OUTPATIENT)
Dept: FAMILY MEDICINE CLINIC | Age: 47
End: 2022-06-21
Payer: MEDICARE

## 2022-06-21 VITALS
SYSTOLIC BLOOD PRESSURE: 136 MMHG | RESPIRATION RATE: 16 BRPM | WEIGHT: 259.4 LBS | OXYGEN SATURATION: 97 % | DIASTOLIC BLOOD PRESSURE: 76 MMHG | BODY MASS INDEX: 37.22 KG/M2 | TEMPERATURE: 98.3 F | HEART RATE: 71 BPM

## 2022-06-21 DIAGNOSIS — F17.200 SMOKING: ICD-10-CM

## 2022-06-21 DIAGNOSIS — Z12.11 SCREENING FOR COLON CANCER: ICD-10-CM

## 2022-06-21 DIAGNOSIS — K58.1 IRRITABLE BOWEL SYNDROME WITH CONSTIPATION: ICD-10-CM

## 2022-06-21 DIAGNOSIS — M51.34 DDD (DEGENERATIVE DISC DISEASE), THORACIC: ICD-10-CM

## 2022-06-21 DIAGNOSIS — I10 ESSENTIAL HYPERTENSION, BENIGN: Primary | ICD-10-CM

## 2022-06-21 DIAGNOSIS — R06.02 SHORTNESS OF BREATH: ICD-10-CM

## 2022-06-21 DIAGNOSIS — Z86.16 HISTORY OF COVID-19: ICD-10-CM

## 2022-06-21 PROCEDURE — G9717 DOC PT DX DEP/BP F/U NT REQ: HCPCS | Performed by: INTERNAL MEDICINE

## 2022-06-21 PROCEDURE — G8417 CALC BMI ABV UP PARAM F/U: HCPCS | Performed by: INTERNAL MEDICINE

## 2022-06-21 PROCEDURE — G8754 DIAS BP LESS 90: HCPCS | Performed by: INTERNAL MEDICINE

## 2022-06-21 PROCEDURE — G8752 SYS BP LESS 140: HCPCS | Performed by: INTERNAL MEDICINE

## 2022-06-21 PROCEDURE — G8427 DOCREV CUR MEDS BY ELIG CLIN: HCPCS | Performed by: INTERNAL MEDICINE

## 2022-06-21 PROCEDURE — 99214 OFFICE O/P EST MOD 30 MIN: CPT | Performed by: INTERNAL MEDICINE

## 2022-06-21 NOTE — PROGRESS NOTES
Patient seen for routine follow up, reports cessation of cigarettes as of May and having hot flashes with irregular menses. Also c/o difficulty taking deep breaths, ma    Health Maintenance Due   Topic Date Due    Hepatitis C Screening  Never done    Pneumococcal 0-64 years (1 - PCV) Never done    DTaP/Tdap/Td series (1 - Tdap) Never done    Colorectal Cancer Screening Combo  Never done    Cervical cancer screen  07/06/2022     1. \"Have you been to the ER, urgent care clinic since your last visit? Hospitalized since your last visit? \" No    2. \"Have you seen or consulted any other health care providers outside of the [address] [place] [address] since your last visit? \" No     3. For patients aged 39-70: Has the patient had a colonoscopy / FIT/ Cologuard? No      If the patient is female:    4. For patients aged 41-77: Has the patient had a mammogram within the past 2 years? Yes - no Care Gap present      5. For patients aged 21-65: Has the patient had a pap smear?  Yes - no Care Gap present

## 2022-06-21 NOTE — PATIENT INSTRUCTIONS
Diet for Irritable Bowel Syndrome: Care Instructions  Overview     Irritable bowel syndrome, or IBS, is a problem with the intestines. IBS can cause belly pain, bloating, gas, constipation, and diarrhea. Most people can control their symptoms by changing their diet and easing stress. No specific foods cause everyone with IBS to have symptoms. Many people find that they feel better by limiting or eliminating foods that may bring on symptoms. Make sure you don't stop eating all foods from any one food group without talking with a dietitian. You need to make sure you are still getting all the nutrients you need. Follow-up care is a key part of your treatment and safety. Be sure to make and go to all appointments, and call your doctor if you are having problems. It's also a good idea to know your test results and keep a list of the medicines you take. How can you care for yourself at home? To reduce constipation  · Check with your doctor about increasing the amount of fiber you eat. If your doctor recommends more fiber:  ? Slowly increase the amount of fiber you eat. For some people who have IBS, eating more fiber may make some symptoms worse. This includes bloating. Adding fiber slowly may help you avoid these problems. ? Include fruits, vegetables, beans, and whole grains in your diet each day. These foods are high in fiber. ? Take a fiber supplement, such as Citrucel or Metamucil, every day if needed. Read and follow all instructions on the label. · Drink plenty of fluids. If you have kidney, heart, or liver disease and have to limit fluids, talk with your doctor before you increase the amount of fluids you drink. · Get some exercise every day. Build up slowly to 30 to 60 minutes a day on 5 or more days of the week. · Schedule time each day for a bowel movement. Having a daily routine may help. Take your time and do not strain when having a bowel movement.   To reduce diarrhea  You may try giving up foods or drinks one at a time to see whether symptoms improve. Limit or avoid the following:  · Alcohol  · Caffeine, which is found in coffee, tea, cola drinks, and chocolate  · Nicotine, from smoking or chewing tobacco  · Gas-producing foods, such as beans, broccoli, cabbage, and apples  · Dairy products that contain lactose (milk sugar), such as ice cream and milk. · Foods and drinks high in sugar, especially fruit juice, soda, candy, and other packaged sweets (such as cookies)  · Foods high in fat, including escobar, sausage, butter, oils, and anything deep-fried  · Sorbitol and xylitol, artificial sweeteners found in some sugarless candies and chewing gum  Keep track of foods  · Some people with IBS use a daily food diary to keep track of what they eat and whether they have any symptoms after eating certain foods. The diary also can be a good way to record what is going on in your life. · Stress plays a role in IBS. So if you are aware that certain stresses bring on symptoms, you can try to reduce those stresses. Keep mealtimes pleasant  · Try to maintain a pleasant environment when you eat. This may reduce stress that can make symptoms likely to occur. · Give yourself plenty of time to eat, rather than eating on the go. Chew your food slowly. Try not to swallow air, which can cause bloating. Where can you learn more? Go to http://www.gray.com/  Enter H703 in the search box to learn more about \"Diet for Irritable Bowel Syndrome: Care Instructions. \"  Current as of: September 8, 2021               Content Version: 13.2  © 5588-7301 [employer]. Care instructions adapted under license by [employer] (which disclaims liability or warranty for this information).  If you have questions about a medical condition or this instruction, always ask your healthcare professional. [address] any warranty or liability for your use of this information.
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
## 2022-07-06 ENCOUNTER — PATIENT OUTREACH (OUTPATIENT)
Dept: CASE MANAGEMENT | Age: 47
End: 2022-07-06

## 2022-07-06 NOTE — PROGRESS NOTES
.Complex Case Management  Follow-Up       Date/Time:   7/6/22  2:33 PM       Ambulatory Care Manager ( ACM) contacted patient for Complex Case Management  follow up. Left call back number for return call     Noted :  No ED or hospital admissions Attending physical therapy for upper back pains  Attended PCP appointment 6/21 with follow up 9/21/22        Specialist /ED visited since last outreach? No   If so, specialist / ED and date: N/A    Next PCP Appointment: 9/21/22    Medications:     New medications since last outreach: No  Medication changes since last outreach (dose adjustments or discontinued meds): N/A         Patient reminded that there are physicians on call 24 hours a day / 7 days a week . Call Ottoniel Ahn [address] [place] [address] [phone] Mon-Fri 8:00-4:30.

## 2022-07-27 NOTE — PROGRESS NOTES
[facility] [address] [facility] [address]  Phone: [phone] Fax: [phone]  DISCHARGE NOTE   Patient Name: Clemente King : 1975   Treatment/Medical Diagnosis: Upper back pain [M54.9]  Other low back pain [M54.59]   Referral Source: Kamar Alvarez MD     Date of Initial Visit: 22 Attended Visits: 13 Missed Visits: 0   PATIENT DID NOT RETURN AS RECOMMENDED AFTER BELOW PROGRESS NOTE WAS SENT ON - DC AT THIS TIME       SUMMARY OF TREATMENT  Pt is a 52 yo female that presents to PT today w/ chief complaint of back pain (mid>lower back pain) that is chronic in nature and has worsened over the last year after she had COVID. Pt S&S are consistent w/ OA however it is also noted that pt has difficulty w/ deep breathing. Treatment consist of therapeutic exercise including ROM, stretching, gentle strengthening, breathing training, postural ed, patient education, HEP instruction,CP, and manual therapy. 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 Patient states feeling like she can breathe deeper since last progress note, possibly due to improved rib excursion, and therefore notes a minor improvement in activity tolerance.  Assessment as follows:     HEP: fair compliance recently due to death in family  Improvements: improved breathing and chest wall expansion, general ADLs, return to work cleaning 20+ floors, improved squatting/transfers, reduce frequency and intensity of dizziness due to activity modification  Deficits: heavier lifting, increased stiffness at end of workday, continued dizziness when lying on the left side  FOTO score: 52/100 (at last assessment, 43/100)  L/S AROM (%): flex fingertips to ankles, ext 30 deg, (R)SB fingertips at 5 inches above apex of patella, (L)SB fingertips at 3 inches above apex of patella, (R)rot 50%, (L)rot 75%  Hip strength: flex 5/5 bilaterally, abd (R) 4+/5 (L) 4-/5, ext (R) 4+/5 (L) 4-/5, ER/IR not tested  Core strength: 50% bridge  Scapular strength: MT 4/5, LT <3/5  Pain: (B) 0, (W) 8    Goal/Measure of Progress       Pt will have improved l/s extension AROM to 15 deg to allow for more erect posture and reduced L/S strain during ambulation/standing tasks. Status at last assessment neutral: exhibits 15 d trunk flex in standing  Current: goal met; 30 deg L/S extension (22)     Pt will have improved bridge ability to full range in order to allow for greater ease of transitions in bed. Status at last assessment limited 50%  Current: goal progressing; limited 50%     Pt will have improved FOTO score to predicted value of 57/100 in order to indicate improved tolerance to functional activity. Status at last assessment 48/100  Current: goal progressing; 52/100     Pt will have increased rib excursion to 1.5 inches in order to indicate better ability to take deep breaths, to allow for greater air flow and decreased effort of breathing w/ functional activity. Status at last assessment 1/2 inch  Current: goal progressing; 1.0 inch     5. Patient to report decreased dizziness by 50% indicating improved mm tone in c/s musculature. -Goal progressing; pt notes continued dizziness when lying on left side; defers BPPV testing due to intent to attend late 's /wake tomorrow      RECOMMENDATIONS  DC sec to non return     If you have any questions/comments please contact us directly at [phone]. Thank you for allowing us to assist in the care of your patient.   PT Signature: Lou Ruiz, PT  Date: 2022     Time: 5867D     Reporting Period:

## 2022-08-18 ENCOUNTER — PATIENT OUTREACH (OUTPATIENT)
Dept: CASE MANAGEMENT | Age: 47
End: 2022-08-18

## 2022-08-18 NOTE — PROGRESS NOTES
.Patient has graduated from the Complex Case Management  program on 8/18/22. Patient/family has the ability to self-manage at this time. Care management goals have been completed. No further Ambulatory Care Manager follow up scheduled. Goals Addressed                   This Visit's Progress       Chronic Disease     COMPLETED: Coordinate pain management plan for patient. Patient will attend Physical therapy as ordered for upper back pains; First secession 4/5/2022 4/21/22   Patient has started PT with 4 episodes down. With 6 more secessions noted       COMPLETED: Develops appropriate coping skills. Insomnia   Patient voicing she can't sleep stays up sometimes 48 hours at a time. ACM will share with PCP as patient was on Trazodone 50 mg in the past.  4/21/22  Patient is to  Trazodone soon. COMPLETED: Relieve psychological distress and prepare patient/family for challenges with disease process. Patient has Severe episodes of Major depression Declining to go back to Dr Doherty Standing  Wellbutrin   150 mg for her smoking is helping  4/21/22  Patient is having to stop Wellbutrin due to Clinical trails for Smoking secession  Psychiatry discuss continue to encurage          General     COMPLETED: Follows nutrition recommendations and maintains goal weight (small/ frequent meals)        4/21/22  Patient verbalizing she only eats 1 meal a day due to her GI symptoms. She can't eat a lot or small frequent feedings, it upsets her stomach dose things to her bowels. COMPLETED: Self-schedules and keeps appointments         4/21/22  Patient has kept f/u appointments since last encounter. COMPLETED: Takes all medications as ordered               Patient has Ambulatory Care Manager's contact information for any further questions, concerns, or needs.   Patients upcoming visits:    Future Appointments   Date Time Provider [facility]ina South   9/21/2022  8:30 AM MD JOHNATHON Ceballos BS AMB

## 2022-09-20 ENCOUNTER — TELEPHONE (OUTPATIENT)
Dept: FAMILY MEDICINE CLINIC | Age: 47
End: 2022-09-20

## 2022-09-20 NOTE — TELEPHONE ENCOUNTER
EUNICE    ----- Message from [facility] sent at 9/20/2022  9:21 AM EDT -----  Subject: Referral Request    Reason for referral request? Patient called Gastro provider to schedule   colonoscopy. Provider states she has to be cleared for the procedure   medically, before they can schedule the actual colonoscopy. Which provider   can not do until December. Also patient has not scheduled with a lung DR   as of yet. Patient is also aware she needs to schedule a papsmear.   Provider patient wants to be referred to(if known):     Provider Phone Number(if known):[phone]    Additional Information for Provider?   ---------------------------------------------------------------------------  --------------  [address]    [phone]; OK to leave message on voicemail  ---------------------------------------------------------------------------  --------------

## 2022-09-22 ENCOUNTER — OFFICE VISIT (OUTPATIENT)
Dept: FAMILY MEDICINE CLINIC | Age: 47
End: 2022-09-22
Payer: MEDICARE

## 2022-09-22 VITALS
SYSTOLIC BLOOD PRESSURE: 133 MMHG | WEIGHT: 260 LBS | DIASTOLIC BLOOD PRESSURE: 81 MMHG | RESPIRATION RATE: 16 BRPM | HEIGHT: 70 IN | HEART RATE: 66 BPM | BODY MASS INDEX: 37.22 KG/M2 | TEMPERATURE: 99 F | OXYGEN SATURATION: 97 %

## 2022-09-22 DIAGNOSIS — R60.9 EDEMA, UNSPECIFIED TYPE: ICD-10-CM

## 2022-09-22 DIAGNOSIS — G89.29 CHRONIC MID BACK PAIN: Primary | ICD-10-CM

## 2022-09-22 DIAGNOSIS — K58.1 IRRITABLE BOWEL SYNDROME WITH CONSTIPATION: ICD-10-CM

## 2022-09-22 DIAGNOSIS — M54.9 CHRONIC MID BACK PAIN: Primary | ICD-10-CM

## 2022-09-22 DIAGNOSIS — M51.34 DDD (DEGENERATIVE DISC DISEASE), THORACIC: ICD-10-CM

## 2022-09-22 DIAGNOSIS — G47.00 INSOMNIA, UNSPECIFIED TYPE: ICD-10-CM

## 2022-09-22 DIAGNOSIS — I10 ESSENTIAL HYPERTENSION, BENIGN: ICD-10-CM

## 2022-09-22 DIAGNOSIS — F33.2 SEVERE EPISODE OF RECURRENT MAJOR DEPRESSIVE DISORDER, WITHOUT PSYCHOTIC FEATURES (HCC): ICD-10-CM

## 2022-09-22 PROCEDURE — G8754 DIAS BP LESS 90: HCPCS | Performed by: INTERNAL MEDICINE

## 2022-09-22 PROCEDURE — G8752 SYS BP LESS 140: HCPCS | Performed by: INTERNAL MEDICINE

## 2022-09-22 PROCEDURE — G8427 DOCREV CUR MEDS BY ELIG CLIN: HCPCS | Performed by: INTERNAL MEDICINE

## 2022-09-22 PROCEDURE — G9717 DOC PT DX DEP/BP F/U NT REQ: HCPCS | Performed by: INTERNAL MEDICINE

## 2022-09-22 PROCEDURE — 99214 OFFICE O/P EST MOD 30 MIN: CPT | Performed by: INTERNAL MEDICINE

## 2022-09-22 PROCEDURE — G8417 CALC BMI ABV UP PARAM F/U: HCPCS | Performed by: INTERNAL MEDICINE

## 2022-09-22 RX ORDER — HYDROCHLOROTHIAZIDE 25 MG/1
25 TABLET ORAL DAILY
Qty: 90 TABLET | Refills: 1 | Status: SHIPPED | OUTPATIENT
Start: 2022-09-22

## 2022-09-22 RX ORDER — TRAZODONE HYDROCHLORIDE 50 MG/1
50 TABLET ORAL
Qty: 90 TABLET | Refills: 1 | Status: SHIPPED | OUTPATIENT
Start: 2022-09-22
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
## 2022-09-22 NOTE — PROGRESS NOTES
Patient seen for routine follow up on chest and back pain when laying down and sitting. Denies pain during ambulation. Patient has not seen Pulmonary or GI due to family emergencies and death of spouse. Would like rx for Trazodone   Vaccine declined  Health Maintenance Due   Topic Date Due    Hepatitis C Screening  Never done    DTaP/Tdap/Td series (1 - Tdap) Never done    Colorectal Cancer Screening Combo  02/01/2021    Cervical cancer screen  07/06/2022    Flu Vaccine (1) Never done     1. \"Have you been to the ER, urgent care clinic since your last visit? Hospitalized since your last visit? \" No    2. \"Have you seen or consulted any other health care providers outside of the [address] [place] [address] since your last visit? \" No     3. For patients aged 39-70: Has the patient had a colonoscopy / FIT/ Cologuard? No      If the patient is female:    4. For patients aged 41-77: Has the patient had a mammogram within the past 2 years? Yes - no Care Gap present      5. For patients aged 21-65: Has the patient had a pap smear?  Yes - no Care Gap present

## 2022-10-07 ENCOUNTER — PATIENT MESSAGE (OUTPATIENT)
Dept: FAMILY MEDICINE CLINIC | Age: 47
End: 2022-10-07

## 2022-10-12 NOTE — PROGRESS NOTES
.Date/Time:  6/3/2021 3:09 PM    Method of communication with patient:phone    [facility] ( [facility]) made out reach to  patient by telephone to offer Complex Case Management  ( CCM). Provided introduction to self, and explanation of the Ambulatory Care . Contact at [phone] anytime Monday thru Friday 08:00-4:30. No

## 2022-11-23 DIAGNOSIS — G89.29 CHRONIC MID BACK PAIN: ICD-10-CM

## 2022-11-23 DIAGNOSIS — M54.9 CHRONIC MID BACK PAIN: ICD-10-CM

## 2022-11-23 DIAGNOSIS — M51.34 DDD (DEGENERATIVE DISC DISEASE), THORACIC: ICD-10-CM

## 2023-05-18 ENCOUNTER — TELEPHONE (OUTPATIENT)
Facility: CLINIC | Age: 48
End: 2023-05-18

## 2023-05-22 ENCOUNTER — OFFICE VISIT (OUTPATIENT)
Facility: CLINIC | Age: 48
End: 2023-05-22
Payer: MEDICARE

## 2023-05-22 VITALS
SYSTOLIC BLOOD PRESSURE: 133 MMHG | DIASTOLIC BLOOD PRESSURE: 87 MMHG | OXYGEN SATURATION: 99 % | BODY MASS INDEX: 36.79 KG/M2 | HEIGHT: 70 IN | HEART RATE: 75 BPM | WEIGHT: 257 LBS | RESPIRATION RATE: 16 BRPM | TEMPERATURE: 98.2 F

## 2023-05-22 DIAGNOSIS — I10 ESSENTIAL (PRIMARY) HYPERTENSION: ICD-10-CM

## 2023-05-22 DIAGNOSIS — F33.2 MAJOR DEPRESSIVE DISORDER, RECURRENT SEVERE WITHOUT PSYCHOTIC FEATURES (HCC): ICD-10-CM

## 2023-05-22 DIAGNOSIS — Z11.59 NEED FOR HEPATITIS C SCREENING TEST: ICD-10-CM

## 2023-05-22 DIAGNOSIS — R06.09 DOE (DYSPNEA ON EXERTION): Primary | ICD-10-CM

## 2023-05-22 DIAGNOSIS — Z11.4 SCREENING FOR HUMAN IMMUNODEFICIENCY VIRUS: ICD-10-CM

## 2023-05-22 DIAGNOSIS — Z12.11 SCREENING FOR COLON CANCER: ICD-10-CM

## 2023-05-22 DIAGNOSIS — Z83.3 FAMILY HISTORY OF DIABETES MELLITUS (DM): ICD-10-CM

## 2023-05-22 PROBLEM — I73.9 PERIPHERAL VASCULAR DISEASE, UNSPECIFIED (HCC): Status: ACTIVE | Noted: 2023-05-22

## 2023-05-22 PROCEDURE — 3079F DIAST BP 80-89 MM HG: CPT | Performed by: INTERNAL MEDICINE

## 2023-05-22 PROCEDURE — G8427 DOCREV CUR MEDS BY ELIG CLIN: HCPCS | Performed by: INTERNAL MEDICINE

## 2023-05-22 PROCEDURE — 99215 OFFICE O/P EST HI 40 MIN: CPT | Performed by: INTERNAL MEDICINE

## 2023-05-22 PROCEDURE — G8417 CALC BMI ABV UP PARAM F/U: HCPCS | Performed by: INTERNAL MEDICINE

## 2023-05-22 PROCEDURE — 3075F SYST BP GE 130 - 139MM HG: CPT | Performed by: INTERNAL MEDICINE

## 2023-05-22 PROCEDURE — 1036F TOBACCO NON-USER: CPT | Performed by: INTERNAL MEDICINE

## 2023-05-22 RX ORDER — VALSARTAN AND HYDROCHLOROTHIAZIDE 80; 12.5 MG/1; MG/1
1 TABLET, FILM COATED ORAL DAILY
Qty: 30 TABLET | Refills: 3 | Status: SHIPPED | OUTPATIENT
Start: 2023-05-22

## 2023-05-22 ASSESSMENT — PATIENT HEALTH QUESTIONNAIRE - PHQ9
2. FEELING DOWN, DEPRESSED OR HOPELESS: 1
SUM OF ALL RESPONSES TO PHQ QUESTIONS 1-9: 2
SUM OF ALL RESPONSES TO PHQ QUESTIONS 1-9: 9
SUM OF ALL RESPONSES TO PHQ9 QUESTIONS 1 & 2: 2
1. LITTLE INTEREST OR PLEASURE IN DOING THINGS: 1
1. LITTLE INTEREST OR PLEASURE IN DOING THINGS: 1
4. FEELING TIRED OR HAVING LITTLE ENERGY: 3
SUM OF ALL RESPONSES TO PHQ QUESTIONS 1-9: 2
SUM OF ALL RESPONSES TO PHQ QUESTIONS 1-9: 2
3. TROUBLE FALLING OR STAYING ASLEEP: 3
SUM OF ALL RESPONSES TO PHQ QUESTIONS 1-9: 2
SUM OF ALL RESPONSES TO PHQ9 QUESTIONS 1 & 2: 2
SUM OF ALL RESPONSES TO PHQ QUESTIONS 1-9: 9
10. IF YOU CHECKED OFF ANY PROBLEMS, HOW DIFFICULT HAVE THESE PROBLEMS MADE IT FOR YOU TO DO YOUR WORK, TAKE CARE OF THINGS AT HOME, OR GET ALONG WITH OTHER PEOPLE: 1
SUM OF ALL RESPONSES TO PHQ QUESTIONS 1-9: 9
7. TROUBLE CONCENTRATING ON THINGS, SUCH AS READING THE NEWSPAPER OR WATCHING TELEVISION: 0
5. POOR APPETITE OR OVEREATING: 0
9. THOUGHTS THAT YOU WOULD BE BETTER OFF DEAD, OR OF HURTING YOURSELF: 0
SUM OF ALL RESPONSES TO PHQ QUESTIONS 1-9: 9
2. FEELING DOWN, DEPRESSED OR HOPELESS: 1
6. FEELING BAD ABOUT YOURSELF - OR THAT YOU ARE A FAILURE OR HAVE LET YOURSELF OR YOUR FAMILY DOWN: 1

## 2023-05-22 NOTE — PROGRESS NOTES
Assessment/ Plan:   Anirudh Edwards was seen today for follow-up chronic condition, letter and referral - general.    Diagnoses and all orders for this visit:    Sayra Benito (dyspnea on exertion)-currently being worked up by Fernando Rigoberto for possible pulmo fibrosis post Covid pneumonia; need to rule out other causes such as cardiac so will refer to Cardio  -     Ambulatory referral to Cardiology    Essential (primary) hypertension-doing better on Valsartan HCT so will refill this  -     CBC; Future  -     Comprehensive Metabolic Panel; Future  -     Lipid Panel; Future  -     valsartan-hydroCHLOROthiazide (DIOVAN HCT) 80-12.5 MG per tablet; Take 1 tablet by mouth daily    Major depressive disorder, recurrent severe without psychotic features (HCC)-currently off meds  -     External Referral To Psychiatry    Screening for colon cancer  -     External Referral To Gastroenterology    Family history of diabetes mellitus (DM)  -     Hemoglobin A1C; Future    Screening for human immunodeficiency virus  -     HIV 1/2 Ag/Ab, 4TH Generation,W Rflx Confirm; Future    Need for hepatitis C screening test  -     Hepatitis C Ab, Rflx to Qt by PCR; Future    This visit took over 45 minutes to complete, >50% of which was spent in coordination of care including conferring with her  Pulmo doc Dr Deniz Solis; letter written for her not to have her apartment sprayed for bugs    Follow-up and Dispositions    Return in about 3 months (around 8/22/2023) for follow up. Chief Complaint   Patient presents with    Follow-up Chronic Condition    Letter     Requesting letter to her apartments to advise they can not spray her apartment due to her breathing issues.     Referral - General     Requesting referral for depression        Pt is a 49y.o. year old female who presents for follow up of her chronic medical problems    Health Maintenance Due   Topic Date Due    HIV screen -today Never done    Hepatitis C screen -today Never done    DTaP/Tdap/Td vaccine

## 2023-05-22 NOTE — PROGRESS NOTES
1. \"Have you been to the ER, urgent care clinic since your last visit? Hospitalized since your last visit? \" No    2. \"Have you seen or consulted any other health care providers outside of the [address] [place] [address] since your last visit? \" Yes Dr. Tristin Hamilton   He wants to talk to you. 3. For patients aged 39-70: Has the patient had a colonoscopy / FIT/ Cologuard? [address] present. Most recent result on file      If the patient is female:    4. For patients aged 41-77: Has the patient had a mammogram within the past 2 years? Yes - Care Gap present. Most recent result on file    5. For patients aged 21-65: Has the patient had a pap smear? Yes - Care Gap present.  Most recent result on file    Health Maintenance Due   Topic Date Due    HIV screen  Never done    Hepatitis C screen  Never done    DTaP/Tdap/Td vaccine (1 - Tdap) Never done    Colorectal Cancer Screen  Never done    COVID-19 Vaccine (4 - Booster for Pfizer series) 12/16/2021    Cervical cancer screen  07/06/2022    A1C test (Diabetic or Prediabetic)  03/01/2023    Depression Monitoring  06/21/2023

## 2023-05-23 LAB
ALBUMIN SERPL-MCNC: 4.8 G/DL (ref 3.8–4.8)
ALBUMIN/GLOB SERPL: 1.7 {RATIO} (ref 1.2–2.2)
ALP SERPL-CCNC: 90 IU/L (ref 44–121)
ALT SERPL-CCNC: 23 IU/L (ref 0–32)
AST SERPL-CCNC: 28 IU/L (ref 0–40)
BASOPHILS # BLD AUTO: 0 X10E3/UL (ref 0–0.2)
BASOPHILS NFR BLD AUTO: 0 %
BILIRUB SERPL-MCNC: 0.4 MG/DL (ref 0–1.2)
BUN SERPL-MCNC: 15 MG/DL (ref 6–24)
BUN/CREAT SERPL: 14 (ref 9–23)
CALCIUM SERPL-MCNC: 9.9 MG/DL (ref 8.7–10.2)
CHLORIDE SERPL-SCNC: 102 MMOL/L (ref 96–106)
CHOLEST SERPL-MCNC: 197 MG/DL (ref 100–199)
CO2 SERPL-SCNC: 23 MMOL/L (ref 20–29)
CREAT SERPL-MCNC: 1.06 MG/DL (ref 0.57–1)
EGFRCR SERPLBLD CKD-EPI 2021: 65 ML/MIN/1.73
EOSINOPHIL # BLD AUTO: 0.1 X10E3/UL (ref 0–0.4)
EOSINOPHIL NFR BLD AUTO: 1 %
ERYTHROCYTE [DISTWIDTH] IN BLOOD BY AUTOMATED COUNT: 12.9 % (ref 11.7–15.4)
GLOBULIN SER CALC-MCNC: 2.9 G/DL (ref 1.5–4.5)
GLUCOSE SERPL-MCNC: 96 MG/DL (ref 70–99)
HBA1C MFR BLD: 5.7 % (ref 4.8–5.6)
HCT VFR BLD AUTO: 39.6 % (ref 34–46.6)
HCV AB SERPL QL IA: NORMAL
HCV IGG SERPL QL IA: NON REACTIVE
HDLC SERPL-MCNC: 51 MG/DL
HGB BLD-MCNC: 12.5 G/DL (ref 11.1–15.9)
HIV 1+2 AB+HIV1 P24 AG SERPL QL IA: NON REACTIVE
IMM GRANULOCYTES # BLD AUTO: 0 X10E3/UL (ref 0–0.1)
IMM GRANULOCYTES NFR BLD AUTO: 0 %
LDLC SERPL CALC-MCNC: 128 MG/DL (ref 0–99)
LYMPHOCYTES # BLD AUTO: 2.6 X10E3/UL (ref 0.7–3.1)
LYMPHOCYTES NFR BLD AUTO: 46 %
MCH RBC QN AUTO: 26.5 PG (ref 26.6–33)
MCHC RBC AUTO-ENTMCNC: 31.6 G/DL (ref 31.5–35.7)
MCV RBC AUTO: 84 FL (ref 79–97)
MONOCYTES # BLD AUTO: 0.6 X10E3/UL (ref 0.1–0.9)
MONOCYTES NFR BLD AUTO: 10 %
NEUTROPHILS # BLD AUTO: 2.4 X10E3/UL (ref 1.4–7)
NEUTROPHILS NFR BLD AUTO: 43 %
PLATELET # BLD AUTO: 415 X10E3/UL (ref 150–450)
POTASSIUM SERPL-SCNC: 4.7 MMOL/L (ref 3.5–5.2)
PROT SERPL-MCNC: 7.7 G/DL (ref 6–8.5)
RBC # BLD AUTO: 4.72 X10E6/UL (ref 3.77–5.28)
SODIUM SERPL-SCNC: 138 MMOL/L (ref 134–144)
SPECIMEN STATUS REPORT: NORMAL
TRIGL SERPL-MCNC: 101 MG/DL (ref 0–149)
VLDLC SERPL CALC-MCNC: 18 MG/DL (ref 5–40)
WBC # BLD AUTO: 5.7 X10E3/UL (ref 3.4–10.8)

## 2023-08-22 ENCOUNTER — OFFICE VISIT (OUTPATIENT)
Facility: CLINIC | Age: 48
End: 2023-08-22
Payer: MEDICARE

## 2023-08-22 VITALS
SYSTOLIC BLOOD PRESSURE: 134 MMHG | HEART RATE: 85 BPM | OXYGEN SATURATION: 99 % | DIASTOLIC BLOOD PRESSURE: 79 MMHG | WEIGHT: 257 LBS | RESPIRATION RATE: 16 BRPM | TEMPERATURE: 99.1 F | BODY MASS INDEX: 36.79 KG/M2 | HEIGHT: 70 IN

## 2023-08-22 DIAGNOSIS — H53.8 BLURRY VISION, BILATERAL: ICD-10-CM

## 2023-08-22 DIAGNOSIS — G47.00 INSOMNIA, UNSPECIFIED TYPE: ICD-10-CM

## 2023-08-22 DIAGNOSIS — I10 ESSENTIAL (PRIMARY) HYPERTENSION: Primary | ICD-10-CM

## 2023-08-22 DIAGNOSIS — E66.01 SEVERE OBESITY (BMI 35.0-39.9) WITH COMORBIDITY (HCC): ICD-10-CM

## 2023-08-22 DIAGNOSIS — R06.09 DOE (DYSPNEA ON EXERTION): ICD-10-CM

## 2023-08-22 DIAGNOSIS — Z12.11 SCREENING FOR COLON CANCER: ICD-10-CM

## 2023-08-22 DIAGNOSIS — K58.1 IRRITABLE BOWEL SYNDROME WITH CONSTIPATION: ICD-10-CM

## 2023-08-22 PROCEDURE — G8427 DOCREV CUR MEDS BY ELIG CLIN: HCPCS | Performed by: INTERNAL MEDICINE

## 2023-08-22 PROCEDURE — 3078F DIAST BP <80 MM HG: CPT | Performed by: INTERNAL MEDICINE

## 2023-08-22 PROCEDURE — 3074F SYST BP LT 130 MM HG: CPT | Performed by: INTERNAL MEDICINE

## 2023-08-22 PROCEDURE — 99214 OFFICE O/P EST MOD 30 MIN: CPT | Performed by: INTERNAL MEDICINE

## 2023-08-22 PROCEDURE — 1036F TOBACCO NON-USER: CPT | Performed by: INTERNAL MEDICINE

## 2023-08-22 PROCEDURE — G8417 CALC BMI ABV UP PARAM F/U: HCPCS | Performed by: INTERNAL MEDICINE

## 2023-08-22 RX ORDER — VALSARTAN AND HYDROCHLOROTHIAZIDE 80; 12.5 MG/1; MG/1
1 TABLET, FILM COATED ORAL DAILY
Qty: 90 TABLET | Refills: 1 | Status: SHIPPED | OUTPATIENT
Start: 2023-08-22

## 2023-08-22 RX ORDER — TRAZODONE HYDROCHLORIDE 50 MG/1
50 TABLET ORAL NIGHTLY
Qty: 90 TABLET | Refills: 1 | Status: SHIPPED | OUTPATIENT
Start: 2023-08-22

## 2023-08-22 ASSESSMENT — PATIENT HEALTH QUESTIONNAIRE - PHQ9
SUM OF ALL RESPONSES TO PHQ QUESTIONS 1-9: 1
SUM OF ALL RESPONSES TO PHQ9 QUESTIONS 1 & 2: 1
SUM OF ALL RESPONSES TO PHQ QUESTIONS 1-9: 1
1. LITTLE INTEREST OR PLEASURE IN DOING THINGS: 0
2. FEELING DOWN, DEPRESSED OR HOPELESS: 1

## 2023-08-22 NOTE — PROGRESS NOTES
1. \"Have you been to the ER, urgent care clinic since your last visit? Hospitalized since your last visit? \" No    2. \"Have you seen or consulted any other health care providers outside of the [address] [place] [address] since your last visit? \" Yes saw Pulmonary Dr. Shubham Colon    3. For patients aged 43-73: Has the patient had a colonoscopy / FIT/ Cologuard? No      If the patient is female:    4. For patients aged 43-66: Has the patient had a mammogram within the past 2 years? No    5. For patients aged 21-65: Has the patient had a pap smear?  No    Health Maintenance Due   Topic Date Due    DTaP/Tdap/Td vaccine (1 - Tdap) Never done    Colorectal Cancer Screen  Never done    COVID-19 Vaccine (4 - Booster for Pfizer series) 12/16/2021    Cervical cancer screen  07/06/2022    Flu vaccine (1) Never done
05/22/2023 12:00 AM        The 10-year ASCVD risk score (Kenn GAYLE, et al., 2019) is: 4.3%    Values used to calculate the score:      Age: 51 years      Sex: Female      Is Non- : Yes      Diabetic: No      Tobacco smoker: No      Systolic Blood Pressure: 438 mmHg      Is BP treated: Yes      HDL Cholesterol: 51 mg/dL      Total Cholesterol: 197 mg/dL     Needs another opinion on eyes-blurry vision, eyes do not look healthy  ?pterygium-used mom's left over antibiotic drops and the vision cleared  Saw Dr Abner Rodriguez and another one nearby-told nothing was wrong; from over a year ago      Lives by herself but in Assisted Living; needs help at home so she will talk to [facility]  Cannot smell bec of Covid so cannot cook  Mental challenges-cannot get up sometimes/no energy  Needs help with bills, washing clothes  Mother no longer wants to be her caretaker-taking toll on her        ROS:    Pt denies: Wt loss, Fever/Chills, HA, Visual changes, Fatigue, Chest pain, SOB, PIKE, Abd pain, N/V/D/C, Blood in stool or urine, Edema. Pertinent positive as above in HPI. All others were negative    Patient Active Problem List   Diagnosis    Hyperlipidemia    Advance directive declined by patient    Fibroid    Essential hypertension, benign    Trichotillomania    Dysmenorrhea    Chronic female pelvic pain    Adjustment insomnia    Ovarian cyst, bilateral    Tobacco abuse    Severe obesity (BMI 35.0-39. 9) with comorbidity (HCC)    Gastroparesis    Impaired fasting glucose    Severe episode of recurrent major depressive disorder, without psychotic features (HCC)    Vitamin D deficiency    Obesity (BMI 30-39. 9)       Past Medical History:   Diagnosis Date    Chronic female pelvic pain 11/8/2011    Chronic female pelvic pain 11/8/2011    Depression     sees Psych-Dr. Henry Drivers    Dysmenorrhea 11/8/2011    Gastrointestinal disorder     reflux    Hernia of unspecified site of abdominal cavity without mention of obstruction or

## 2023-08-24 ENCOUNTER — TELEPHONE (OUTPATIENT)
Facility: CLINIC | Age: 48
End: 2023-08-24

## 2023-08-24 NOTE — TELEPHONE ENCOUNTER
----- Message from Zak Stockton sent at 8/23/2023 11:24 AM EDT -----  Subject: Message to Provider    QUESTIONS  Information for Provider? Patient called to notify  about the   medication that her Dr. Chuck Stephens prescribed it is Spiriva 1.25 MCG take one   puff twice daily   ---------------------------------------------------------------------------  --------------  [facility] INFO  [phone]; OK to leave message on voicemail  ---------------------------------------------------------------------------  --------------  SCRIPT ANSWERS  Relationship to Patient?  Self

## 2023-08-25 SDOH — ECONOMIC STABILITY: HOUSING INSECURITY
IN THE LAST 12 MONTHS, WAS THERE A TIME WHEN YOU DID NOT HAVE A STEADY PLACE TO SLEEP OR SLEPT IN A SHELTER (INCLUDING NOW)?: NO

## 2023-08-25 SDOH — ECONOMIC STABILITY: FOOD INSECURITY: WITHIN THE PAST 12 MONTHS, THE FOOD YOU BOUGHT JUST DIDN'T LAST AND YOU DIDN'T HAVE MONEY TO GET MORE.: NEVER TRUE

## 2023-08-25 SDOH — ECONOMIC STABILITY: FOOD INSECURITY: WITHIN THE PAST 12 MONTHS, YOU WORRIED THAT YOUR FOOD WOULD RUN OUT BEFORE YOU GOT MONEY TO BUY MORE.: NEVER TRUE

## 2023-08-25 SDOH — ECONOMIC STABILITY: INCOME INSECURITY: HOW HARD IS IT FOR YOU TO PAY FOR THE VERY BASICS LIKE FOOD, HOUSING, MEDICAL CARE, AND HEATING?: NOT VERY HARD

## 2023-10-11 ENCOUNTER — OFFICE VISIT (OUTPATIENT)
Facility: CLINIC | Age: 48
End: 2023-10-11
Payer: MEDICARE

## 2023-10-11 VITALS
BODY MASS INDEX: 36.79 KG/M2 | HEIGHT: 70 IN | OXYGEN SATURATION: 99 % | HEART RATE: 90 BPM | SYSTOLIC BLOOD PRESSURE: 126 MMHG | WEIGHT: 257 LBS | DIASTOLIC BLOOD PRESSURE: 86 MMHG | RESPIRATION RATE: 16 BRPM | TEMPERATURE: 98.7 F

## 2023-10-11 DIAGNOSIS — J20.9 ACUTE BRONCHITIS, UNSPECIFIED ORGANISM: ICD-10-CM

## 2023-10-11 DIAGNOSIS — R21 RASH: Primary | ICD-10-CM

## 2023-10-11 DIAGNOSIS — H65.93 BILATERAL NON-SUPPURATIVE OTITIS MEDIA: ICD-10-CM

## 2023-10-11 PROCEDURE — 99214 OFFICE O/P EST MOD 30 MIN: CPT | Performed by: INTERNAL MEDICINE

## 2023-10-11 PROCEDURE — G8427 DOCREV CUR MEDS BY ELIG CLIN: HCPCS | Performed by: INTERNAL MEDICINE

## 2023-10-11 PROCEDURE — 1036F TOBACCO NON-USER: CPT | Performed by: INTERNAL MEDICINE

## 2023-10-11 PROCEDURE — G8484 FLU IMMUNIZE NO ADMIN: HCPCS | Performed by: INTERNAL MEDICINE

## 2023-10-11 PROCEDURE — 3079F DIAST BP 80-89 MM HG: CPT | Performed by: INTERNAL MEDICINE

## 2023-10-11 PROCEDURE — G8417 CALC BMI ABV UP PARAM F/U: HCPCS | Performed by: INTERNAL MEDICINE

## 2023-10-11 PROCEDURE — 3074F SYST BP LT 130 MM HG: CPT | Performed by: INTERNAL MEDICINE

## 2023-10-11 RX ORDER — METHYLPREDNISOLONE 4 MG/1
TABLET ORAL
Qty: 1 KIT | Refills: 0 | Status: SHIPPED | OUTPATIENT
Start: 2023-10-11 | End: 2023-10-17

## 2023-10-11 RX ORDER — DOXYCYCLINE HYCLATE 100 MG
100 TABLET ORAL 2 TIMES DAILY
Qty: 14 TABLET | Refills: 0 | Status: SHIPPED | OUTPATIENT
Start: 2023-10-11 | End: 2023-10-18

## 2023-10-24 ENCOUNTER — HOSPITAL ENCOUNTER (OUTPATIENT)
Facility: HOSPITAL | Age: 48
Discharge: HOME | End: 2023-10-26
Attending: INTERNAL MEDICINE
Payer: MEDICARE

## 2023-10-24 VITALS
HEIGHT: 70 IN | SYSTOLIC BLOOD PRESSURE: 126 MMHG | BODY MASS INDEX: 36.79 KG/M2 | WEIGHT: 257 LBS | DIASTOLIC BLOOD PRESSURE: 86 MMHG

## 2023-10-24 DIAGNOSIS — I10 ESSENTIAL (PRIMARY) HYPERTENSION: ICD-10-CM

## 2023-10-24 DIAGNOSIS — R06.09 DOE (DYSPNEA ON EXERTION): ICD-10-CM

## 2023-10-24 PROCEDURE — 93306 TTE W/DOPPLER COMPLETE: CPT | Performed by: INTERNAL MEDICINE

## 2023-10-24 PROCEDURE — 93306 TTE W/DOPPLER COMPLETE: CPT

## 2023-10-25 LAB
ECHO AO ASC DIAM: 3.2 CM
ECHO AO ASCENDING AORTA INDEX: 1.38 CM/M2
ECHO AO ROOT DIAM: 3.1 CM
ECHO AO ROOT INDEX: 1.34 CM/M2
ECHO AV AREA PEAK VELOCITY: 2.7 CM2
ECHO AV AREA VTI: 2.8 CM2
ECHO AV AREA/BSA PEAK VELOCITY: 1.2 CM2/M2
ECHO AV AREA/BSA VTI: 1.2 CM2/M2
ECHO AV MEAN GRADIENT: 4 MMHG
ECHO AV MEAN VELOCITY: 1 M/S
ECHO AV PEAK GRADIENT: 7 MMHG
ECHO AV PEAK VELOCITY: 1.4 M/S
ECHO AV VELOCITY RATIO: 0.86
ECHO AV VTI: 29.2 CM
ECHO BSA: 2.4 M2
ECHO LA VOL 2C: 98 ML (ref 22–52)
ECHO LA VOL 4C: 70 ML (ref 22–52)
ECHO LA VOL BP: 82 ML (ref 22–52)
ECHO LA VOL/BSA BIPLANE: 35 ML/M2 (ref 16–34)
ECHO LA VOLUME AREA LENGTH: 86 ML
ECHO LA VOLUME INDEX A2C: 42 ML/M2 (ref 16–34)
ECHO LA VOLUME INDEX A4C: 30 ML/M2 (ref 16–34)
ECHO LA VOLUME INDEX AREA LENGTH: 37 ML/M2 (ref 16–34)
ECHO LV E' SEPTAL VELOCITY: 11 CM/S
ECHO LV FRACTIONAL SHORTENING: 29 % (ref 28–44)
ECHO LV INTERNAL DIMENSION DIASTOLE INDEX: 1.81 CM/M2
ECHO LV INTERNAL DIMENSION DIASTOLIC: 4.2 CM (ref 3.9–5.3)
ECHO LV INTERNAL DIMENSION SYSTOLIC INDEX: 1.29 CM/M2
ECHO LV INTERNAL DIMENSION SYSTOLIC: 3 CM
ECHO LV IVSD: 0.8 CM (ref 0.6–0.9)
ECHO LV MASS 2D: 118.7 G (ref 67–162)
ECHO LV MASS INDEX 2D: 51.2 G/M2 (ref 43–95)
ECHO LV POSTERIOR WALL DIASTOLIC: 1 CM (ref 0.6–0.9)
ECHO LV RELATIVE WALL THICKNESS RATIO: 0.48
ECHO LVOT AREA: 3.1 CM2
ECHO LVOT AV VTI INDEX: 0.89
ECHO LVOT DIAM: 2 CM
ECHO LVOT MEAN GRADIENT: 3 MMHG
ECHO LVOT PEAK GRADIENT: 5 MMHG
ECHO LVOT PEAK VELOCITY: 1.2 M/S
ECHO LVOT STROKE VOLUME INDEX: 35.1 ML/M2
ECHO LVOT SV: 81.3 ML
ECHO LVOT VTI: 25.9 CM
ECHO MV A VELOCITY: 0.92 M/S
ECHO MV AREA PHT: 3.3 CM2
ECHO MV E DECELERATION TIME (DT): 250.2 MS
ECHO MV E VELOCITY: 1.14 M/S
ECHO MV E/A RATIO: 1.24
ECHO MV E/E' SEPTAL: 10.36
ECHO MV PRESSURE HALF TIME (PHT): 67.6 MS
ECHO RV TAPSE: 2 CM (ref 1.7–?)
ECHO TV REGURGITANT MAX VELOCITY: 2.44 M/S
ECHO TV REGURGITANT PEAK GRADIENT: 24 MMHG

## 2023-12-14 ENCOUNTER — OFFICE VISIT (OUTPATIENT)
Age: 48
End: 2023-12-14
Payer: MEDICARE

## 2023-12-14 ENCOUNTER — PREP FOR PROCEDURE (OUTPATIENT)
Age: 48
End: 2023-12-14

## 2023-12-14 DIAGNOSIS — R07.9 CHEST PAIN, UNSPECIFIED TYPE: ICD-10-CM

## 2023-12-14 DIAGNOSIS — R07.9 CHEST PAIN, UNSPECIFIED TYPE: Primary | ICD-10-CM

## 2023-12-14 DIAGNOSIS — R06.09 DOE (DYSPNEA ON EXERTION): Primary | ICD-10-CM

## 2023-12-14 PROCEDURE — 99205 OFFICE O/P NEW HI 60 MIN: CPT | Performed by: INTERNAL MEDICINE

## 2023-12-14 PROCEDURE — G8484 FLU IMMUNIZE NO ADMIN: HCPCS | Performed by: INTERNAL MEDICINE

## 2023-12-14 PROCEDURE — G8417 CALC BMI ABV UP PARAM F/U: HCPCS | Performed by: INTERNAL MEDICINE

## 2023-12-14 PROCEDURE — G8428 CUR MEDS NOT DOCUMENT: HCPCS | Performed by: INTERNAL MEDICINE

## 2023-12-14 PROCEDURE — 1036F TOBACCO NON-USER: CPT | Performed by: INTERNAL MEDICINE

## 2023-12-14 RX ORDER — ASPIRIN 81 MG/1
81 TABLET, CHEWABLE ORAL ONCE
OUTPATIENT
Start: 2023-12-14 | End: 2023-12-14

## 2023-12-14 RX ORDER — SODIUM CHLORIDE 9 MG/ML
INJECTION, SOLUTION INTRAVENOUS PRN
OUTPATIENT
Start: 2023-12-14

## 2023-12-14 RX ORDER — NITROGLYCERIN 0.4 MG/1
0.4 TABLET SUBLINGUAL EVERY 5 MIN PRN
Qty: 25 TABLET | Refills: 3 | Status: SHIPPED | OUTPATIENT
Start: 2023-12-14

## 2023-12-14 RX ORDER — SODIUM CHLORIDE 0.9 % (FLUSH) 0.9 %
5-40 SYRINGE (ML) INJECTION EVERY 12 HOURS SCHEDULED
OUTPATIENT
Start: 2023-12-14

## 2023-12-14 RX ORDER — ASPIRIN 81 MG/1
81 TABLET ORAL DAILY
Qty: 90 TABLET | Refills: 1 | Status: SHIPPED | OUTPATIENT
Start: 2023-12-14

## 2023-12-14 RX ORDER — METOPROLOL SUCCINATE 25 MG/1
12.5 TABLET, EXTENDED RELEASE ORAL DAILY
Qty: 30 TABLET | Refills: 3 | Status: SHIPPED | OUTPATIENT
Start: 2023-12-14

## 2023-12-14 RX ORDER — SODIUM CHLORIDE 0.9 % (FLUSH) 0.9 %
5-40 SYRINGE (ML) INJECTION PRN
OUTPATIENT
Start: 2023-12-14

## 2023-12-15 VITALS
BODY MASS INDEX: 36.73 KG/M2 | HEART RATE: 96 BPM | WEIGHT: 256 LBS | OXYGEN SATURATION: 100 % | SYSTOLIC BLOOD PRESSURE: 155 MMHG | DIASTOLIC BLOOD PRESSURE: 92 MMHG

## 2023-12-15 NOTE — PROGRESS NOTES
Identified pt with two pt identifiers(name and ). Reviewed record in preparation for visit and have obtained necessary documentation. Chares Spatz presents today for   Chief Complaint   Patient presents with    New Patient       Pt c/o DIZZINESS, SOB, CHEST PAIN/ PRESSURE, FATIGUE/WEAKNESS, HEADACHES, SWELLING. Chares Spatz preferred language for health care discussion is english/other. Personal Protective Equipment:   Personal Protective Equipment was used including: mask-surgical and hands-gloves. Patient was placed on no precaution(s). Patient was masked. Precautions:   Patient currently on None  Patient currently roomed with door closed. Is someone accompanying this pt? daughter    Is the patient using any DME equipment during OV? no    Depression Screenin/22/2023     1:43 PM 2023     9:35 AM 2023     9:31 AM 2022    11:00 AM 2021     9:00 AM   PHQ-9 Questionaire   Little interest or pleasure in doing things 0 1 1 1 1   Feeling down, depressed, or hopeless 1 1 1 1 1   Trouble falling or staying asleep, or sleeping too much  3      Feeling tired or having little energy  3      Poor appetite or overeating  0      Feeling bad about yourself - or that you are a failure or have let yourself or your family down  1      Trouble concentrating on things, such as reading the newspaper or watching television  0      Thoughts that you would be better off dead, or of hurting yourself in some way  0      PHQ-9 Total Score 1 9 2 2 2   If you checked off any problems, how difficult have these problems made it for you to do your work, take care of things at home, or get along with other people? 1           Learning Assessment:  No question data found. Abuse Screenin/15/2023     4:00 PM   AMB Abuse Screening   Do you ever feel afraid of your partner? N   Are you in a relationship with someone who physically or mentally threatens you?  N   Is

## 2024-01-15 ENCOUNTER — TELEPHONE (OUTPATIENT)
Age: 49
End: 2024-01-15

## 2024-01-15 DIAGNOSIS — R07.9 CHEST PAIN, UNSPECIFIED TYPE: Primary | ICD-10-CM

## 2024-01-15 DIAGNOSIS — R06.09 DOE (DYSPNEA ON EXERTION): ICD-10-CM

## 2024-01-15 NOTE — PROGRESS NOTES
Verbal order with read back Po Valentine MD.  [facility] denied pt needs NST 1st

## 2024-01-15 NOTE — TELEPHONE ENCOUNTER
Attempted to contact pt at  number, no answer. Lvm for pt to return call to office at [phone]. Will continue to try to contact pt.         Re: [facility] denied pt needs NST prior to testing per INS

## 2024-01-16 NOTE — TELEPHONE ENCOUNTER
Contacted pt at  number., Two patient Identifiers confirmed. Informed pt C denied NST required first per INS.  Pt verbalized understanding and made aware of testing appt.

## 2024-01-19 DIAGNOSIS — K58.1 IRRITABLE BOWEL SYNDROME WITH CONSTIPATION: ICD-10-CM

## 2024-01-19 DIAGNOSIS — I10 ESSENTIAL (PRIMARY) HYPERTENSION: ICD-10-CM

## 2024-01-19 NOTE — TELEPHONE ENCOUNTER
Message  Received: Today  [facility] Clinical Staff  Subject: Refill Request    QUESTIONS  Name of Medication? valsartan-hydroCHLOROthiazide (DIOVAN HCT) 80-12.5 MG  per tablet  Patient-reported dosage and instructions? Take 1 tablet by mouth daily  How many days do you have left? 0  Preferred Pharmacy? [facility]  Pharmacy phone number (if available)? [phone]  Additional Information for Provider? patient states that she is completely  out of her medication and her next follow-up appt wont be until 2/1/24 and  states that that is a long way out of not medication and would like an  refill as soon as possible to avoid lapse in medication.  ---------------------------------------------------------------------------  --------------,  Name of Medication? linaclotide (LINZESS) 145 MCG capsule  Patient-reported dosage and instructions? Take 1 capsule by mouth every  morning (before breakfast)  How many days do you have left? 7  Preferred Pharmacy? [facility]  Pharmacy phone number (if available)? [phone]  ---------------------------------------------------------------------------  --------------  CALL BACK INFO  What is the best way for the office to contact you? OK to leave message on  voicemail  Preferred Call Back Phone Number? [phone]  ---------------------------------------------------------------------------  --------------  SCRIPT ANSWERS  Relationship to Patient? Self

## 2024-01-22 RX ORDER — VALSARTAN AND HYDROCHLOROTHIAZIDE 80; 12.5 MG/1; MG/1
1 TABLET, FILM COATED ORAL DAILY
Qty: 90 TABLET | Refills: 1 | Status: SHIPPED | OUTPATIENT
Start: 2024-01-22

## 2024-02-21 ENCOUNTER — TELEPHONE (OUTPATIENT)
Age: 49
End: 2024-02-21

## 2024-02-21 NOTE — TELEPHONE ENCOUNTER
Incoming from patient. Two patient identifiers confirmed. Patient requesting medication refill.    PCP: Nevaeh Ramsey MD  Last appt:  12/14/2023   Future Appointments   Date Time Provider Department Center   3/11/2024  1:00 PM Leonel Duff Sr., MD [facility]   3/28/2024 11:45 AM Po Valentine MD [facility] AMB       Medication requested: metoprolol 25mg, nitroglycerin 0.4mg    Pharmacy: [facility]          Patient

## 2024-02-26 NOTE — PROGRESS NOTES
Confirmed arrival time of 0645 for scheduled LHC on 2/28 at 0800. Reviewed pre-procedure instructions.

## 2024-02-28 ENCOUNTER — HOSPITAL ENCOUNTER (OUTPATIENT)
Facility: HOSPITAL | Age: 49
Setting detail: OUTPATIENT SURGERY
Discharge: HOME | End: 2024-02-28
Attending: INTERNAL MEDICINE | Admitting: INTERNAL MEDICINE
Payer: MEDICARE

## 2024-02-28 VITALS
SYSTOLIC BLOOD PRESSURE: 108 MMHG | BODY MASS INDEX: 36.22 KG/M2 | RESPIRATION RATE: 16 BRPM | DIASTOLIC BLOOD PRESSURE: 74 MMHG | HEART RATE: 85 BPM | HEIGHT: 70 IN | WEIGHT: 253 LBS | OXYGEN SATURATION: 100 %

## 2024-02-28 DIAGNOSIS — I20.0 INTERMEDIATE CORONARY SYNDROME (HCC): ICD-10-CM

## 2024-02-28 DIAGNOSIS — R07.9 CHEST PAIN, UNSPECIFIED TYPE: ICD-10-CM

## 2024-02-28 LAB — ECHO BSA: 2.38 M2

## 2024-02-28 PROCEDURE — 76000 FLUOROSCOPY <1 HR PHYS/QHP: CPT | Performed by: INTERNAL MEDICINE

## 2024-02-28 PROCEDURE — 99152 MOD SED SAME PHYS/QHP 5/>YRS: CPT | Performed by: INTERNAL MEDICINE

## 2024-02-28 PROCEDURE — 93458 L HRT ARTERY/VENTRICLE ANGIO: CPT | Performed by: INTERNAL MEDICINE

## 2024-02-28 PROCEDURE — 6360000004 HC RX CONTRAST MEDICATION: Performed by: INTERNAL MEDICINE

## 2024-02-28 PROCEDURE — C1713 ANCHOR/SCREW BN/BN,TIS/BN: HCPCS | Performed by: INTERNAL MEDICINE

## 2024-02-28 PROCEDURE — 2500000003 HC RX 250 WO HCPCS: Performed by: INTERNAL MEDICINE

## 2024-02-28 PROCEDURE — C1894 INTRO/SHEATH, NON-LASER: HCPCS | Performed by: INTERNAL MEDICINE

## 2024-02-28 PROCEDURE — 6360000002 HC RX W HCPCS: Performed by: INTERNAL MEDICINE

## 2024-02-28 PROCEDURE — 2709999900 HC NON-CHARGEABLE SUPPLY: Performed by: INTERNAL MEDICINE

## 2024-02-28 RX ORDER — METOPROLOL SUCCINATE 25 MG/1
25 TABLET, EXTENDED RELEASE ORAL DAILY
Qty: 90 TABLET | Refills: 3 | Status: SHIPPED | OUTPATIENT
Start: 2024-02-28

## 2024-02-28 RX ORDER — ASPIRIN 81 MG/1
81 TABLET, CHEWABLE ORAL ONCE
Status: DISCONTINUED | OUTPATIENT
Start: 2024-02-28 | End: 2024-02-28 | Stop reason: HOSPADM

## 2024-02-28 RX ORDER — SODIUM CHLORIDE 9 MG/ML
INJECTION, SOLUTION INTRAVENOUS PRN
Status: DISCONTINUED | OUTPATIENT
Start: 2024-02-28 | End: 2024-02-28 | Stop reason: HOSPADM

## 2024-02-28 RX ORDER — NITROGLYCERIN 0.4 MG/1
0.4 TABLET SUBLINGUAL EVERY 5 MIN PRN
Qty: 25 TABLET | Refills: 3 | Status: SHIPPED | OUTPATIENT
Start: 2024-02-28

## 2024-02-28 RX ORDER — MIDAZOLAM HYDROCHLORIDE 1 MG/ML
INJECTION INTRAMUSCULAR; INTRAVENOUS PRN
Status: DISCONTINUED | OUTPATIENT
Start: 2024-02-28 | End: 2024-02-28 | Stop reason: HOSPADM

## 2024-02-28 RX ORDER — SODIUM CHLORIDE 0.9 % (FLUSH) 0.9 %
5-40 SYRINGE (ML) INJECTION PRN
OUTPATIENT
Start: 2024-02-28

## 2024-02-28 RX ORDER — SODIUM CHLORIDE 9 MG/ML
INJECTION, SOLUTION INTRAVENOUS PRN
OUTPATIENT
Start: 2024-02-28

## 2024-02-28 RX ORDER — SODIUM CHLORIDE 0.9 % (FLUSH) 0.9 %
5-40 SYRINGE (ML) INJECTION EVERY 12 HOURS SCHEDULED
Status: DISCONTINUED | OUTPATIENT
Start: 2024-02-28 | End: 2024-02-28 | Stop reason: HOSPADM

## 2024-02-28 RX ORDER — ACETAMINOPHEN 325 MG/1
650 TABLET ORAL EVERY 4 HOURS PRN
OUTPATIENT
Start: 2024-02-28

## 2024-02-28 RX ORDER — SODIUM CHLORIDE 0.9 % (FLUSH) 0.9 %
5-40 SYRINGE (ML) INJECTION EVERY 12 HOURS SCHEDULED
OUTPATIENT
Start: 2024-02-28

## 2024-02-28 RX ORDER — SODIUM CHLORIDE 0.9 % (FLUSH) 0.9 %
5-40 SYRINGE (ML) INJECTION PRN
Status: DISCONTINUED | OUTPATIENT
Start: 2024-02-28 | End: 2024-02-28 | Stop reason: HOSPADM

## 2024-02-28 RX ORDER — METOPROLOL SUCCINATE 25 MG/1
25 TABLET, EXTENDED RELEASE ORAL DAILY
Qty: 90 TABLET | Refills: 1 | Status: SHIPPED | OUTPATIENT
Start: 2024-02-28 | End: 2024-02-28 | Stop reason: SDUPTHER

## 2024-02-28 RX ORDER — VERAPAMIL HYDROCHLORIDE 2.5 MG/ML
INJECTION, SOLUTION INTRAVENOUS PRN
Status: DISCONTINUED | OUTPATIENT
Start: 2024-02-28 | End: 2024-02-28 | Stop reason: HOSPADM

## 2024-02-28 RX ORDER — SODIUM CHLORIDE 9 MG/ML
INJECTION, SOLUTION INTRAVENOUS CONTINUOUS
OUTPATIENT
Start: 2024-02-28

## 2024-02-28 RX ORDER — HEPARIN SODIUM 1000 [USP'U]/ML
INJECTION, SOLUTION INTRAVENOUS; SUBCUTANEOUS PRN
Status: DISCONTINUED | OUTPATIENT
Start: 2024-02-28 | End: 2024-02-28 | Stop reason: HOSPADM

## 2024-02-28 RX ORDER — FENTANYL CITRATE 50 UG/ML
INJECTION, SOLUTION INTRAMUSCULAR; INTRAVENOUS PRN
Status: DISCONTINUED | OUTPATIENT
Start: 2024-02-28 | End: 2024-02-28 | Stop reason: HOSPADM

## 2024-02-28 NOTE — PRE SEDATION
Sedation Plan  ASA: class 2 - patient with mild systemic disease     Mallampati class: II - soft palate, uvula, fauces visible.    Sedation plan: local anesthesia, minimal sedation and moderate (conscious sedation)    Risks, benefits, and alternatives discussed with patient and mother.  Use of blood products discussed with patient and mother who.

## 2024-02-28 NOTE — H&P
Please see clinic note from 12/24/24 as below for detail.  I saw and examined patient and confirmed above.  No interval change.  Labs reviewed.   Procedure explained to patient and all risk and benefit discussed with patient.  Risk, benefit, complication of LHC and possible PCI ( including but not limited to bleeding, infection, heart failure, stroke, MI, emergent bypass surgery, kidney failure, dialysis and death ) were discussed with patient and willing to proceed with procedure.  Proceed as planned.    History and physical has been reviewed. There have been no significant clinical changes since the completion of the originally dated History and Physical.  Will be using moderate sedation.    ------------------------------------------------------------------------------------------------------------------     Mena Bentley is 48 y.o. female      Patient is here today for cardiac evaluation  Denies prior history of MI or CHF  Patient was sent to me for evaluation of dyspnea and chest discomfort  According to patient she had COVID infection about 2 years ago.  Patient has asthma and following with pulmonary team.  She was told that her shortness of breath is out of proportion to her mild fibrotic changes after her COVID and her asthma.  Patient gets short of breath walking probably 50 feet.  She gets easily fatigued and tired.  She has no edema.  She occasionally feels like she has epigastric and lower sternal chest discomfort radiating to the back.  No nausea vomiting diaphoresis.  Overall her functional status is getting worse over the last 1 or 2-year.  No palpitation, presyncope or syncope.  No nausea vomiting diaphoresis  Denies any nausea, vomiting, abdominal pain, fever, chills, sputum production. No hematuria or other bleeding complaints     Past Medical History        Past Medical History:   Diagnosis Date    Depression       sees Psych-Dr. Palm    Dysmenorrhea 11/8/2011    Gastrointestinal disorder

## 2024-02-28 NOTE — DISCHARGE INSTRUCTIONS
until the medication wears off.   Rest when you feel tired.  Drink plenty of fluids  No alcoholic beverages for 24 hours.   Eat a normal diet, unless given other instructions. If stomach is upset, try clear liquids and bland, low-fat foods like rice or toast.   If the doctor gave you a prescription for pain medication, take it as prescribed.   If you are not on any prescribed pain medications, ask your doctor if you can take an over-the-counter pain medications.    General Nurse Instructions:  Follow up care is a key part of treatment and safety. Be sure to make and go to all appointments, and call your doctor if you are having any problems. It's also a good idea to know your test results and keep a list of your medicines you take.  Call 911 if you experience any of the following symptoms:  You passed out  You have severe trouble breathing  You have sudden chest pain and shortness of breath or you cough up blood.  You have symptoms of a heart attack:   Chest pain, pressure, or a strange feeling in the chest  Sweating  Shortness of breath  Nausea or vomiting  Pain, pressure or a strange feeling in the back, neck, jaw, or upper belly or in one or both shoulders or arms  Lightheadedness or sudden weakness  A fast or irregular heart rate.   You have been diagnosed with angina or coronary artery disease, and you have symptoms that do not go away with rest or are not getting better within 5 minutes of nitroglycerin.   After you call 911, the  may tell you to chew 1 adult strength or 2-4 low dose aspirin. Wait for the ambulance do not drive yourself.   Bleeding from the area where the catheter was placed in your artery or vein  You have a fast-growing painful lump at the catheter site  General Instructions for a healthy lifestyle:  No smoking or tobacco products. Avoid any exposure to second hand smoke  Surgeon General's Warning: Quitting smoking now greatly reduces serious risk to your health  Obesity, smoking and

## 2024-02-28 NOTE — PROGRESS NOTES
Cath holding summary:    0700: Patient ambulated from waiting area without difficulty, placed on monitor. A&Ox4, no c/o. ID, allergies, home meds verified, and H&P reviewed. NPO since midnight. PIV x2 inserted, blood sent to lab. Groin prep completed, consent ready for signature. Provider notified of patient arrival.     0755: Verbal report given to taylor Bentley  being transferred to cath lab for ordered procedure. Report consisted of patient's Situation, Background, Assessment and Recommendations (SBAR). Information from the following report(s) Nurse Handoff Report was reviewed with the receiving nurse. Opportunity for questions and clarification was provided.    0845: Verbal report received from taylor on Mena Bentley being received from cath lab for routine post-op. Report consisted of patient's Situation, Background, Assessment and Recommendations (SBAR). Information from the following report(s) Nurse Handoff Report was reviewed with the receiving nurse. Opportunity for questions and clarification was provided. Assessment completed upon patient's arrival to unit and care assumed. A/Ox4 and VSS.  Procedure: Cardiac Cath  Intervention: No  Site: Right  Pain: 0         1045: AVS Discharge instructions reviewed with patient and copy given to patient.  All questions answered.  Patient verbalized understanding to all discharge instructions.  PIV removed. Procedural site within normal limits.  No hematoma or bleeding noted from procedural and PIV site. No pain noted at discharge. Patient back to neurological baseline, alert and oriented times 4. Patient discharged with support person in stable condition.  Escorted out to vehicle by son for transport home.

## 2024-03-06 ENCOUNTER — HOSPITAL ENCOUNTER (OUTPATIENT)
Facility: HOSPITAL | Age: 49
Setting detail: SPECIMEN
Discharge: HOME | End: 2024-03-09
Payer: MEDICARE

## 2024-03-06 ENCOUNTER — TELEPHONE (OUTPATIENT)
Facility: CLINIC | Age: 49
End: 2024-03-06

## 2024-03-06 ENCOUNTER — OFFICE VISIT (OUTPATIENT)
Facility: CLINIC | Age: 49
End: 2024-03-06
Payer: MEDICARE

## 2024-03-06 VITALS
OXYGEN SATURATION: 98 % | HEIGHT: 70 IN | BODY MASS INDEX: 36.49 KG/M2 | TEMPERATURE: 100 F | HEART RATE: 97 BPM | DIASTOLIC BLOOD PRESSURE: 75 MMHG | SYSTOLIC BLOOD PRESSURE: 124 MMHG | WEIGHT: 254.9 LBS

## 2024-03-06 DIAGNOSIS — R42 VERTIGO: ICD-10-CM

## 2024-03-06 DIAGNOSIS — R53.83 OTHER FATIGUE: ICD-10-CM

## 2024-03-06 DIAGNOSIS — I10 ESSENTIAL (PRIMARY) HYPERTENSION: ICD-10-CM

## 2024-03-06 DIAGNOSIS — R06.09 DOE (DYSPNEA ON EXERTION): ICD-10-CM

## 2024-03-06 DIAGNOSIS — K21.9 GASTROESOPHAGEAL REFLUX DISEASE, UNSPECIFIED WHETHER ESOPHAGITIS PRESENT: ICD-10-CM

## 2024-03-06 DIAGNOSIS — R06.09 DOE (DYSPNEA ON EXERTION): Primary | ICD-10-CM

## 2024-03-06 DIAGNOSIS — R21 RASH: ICD-10-CM

## 2024-03-06 DIAGNOSIS — R82.90 ABNORMAL URINE ODOR: ICD-10-CM

## 2024-03-06 DIAGNOSIS — Z12.11 SCREEN FOR COLON CANCER: ICD-10-CM

## 2024-03-06 DIAGNOSIS — M54.9 MID BACK PAIN: ICD-10-CM

## 2024-03-06 LAB
APPEARANCE UR: CLEAR
BILIRUB UR QL: NEGATIVE
COLOR UR: YELLOW
ERYTHROCYTE [SEDIMENTATION RATE] IN BLOOD: 46 MM/HR (ref 0–30)
GLUCOSE UR STRIP.AUTO-MCNC: NEGATIVE MG/DL
HGB UR QL STRIP: NEGATIVE
KETONES UR QL STRIP.AUTO: NEGATIVE MG/DL
LEUKOCYTE ESTERASE UR QL STRIP.AUTO: NEGATIVE
NITRITE UR QL STRIP.AUTO: NEGATIVE
PH UR STRIP: 5.5 (ref 5–8)
PROT UR STRIP-MCNC: NEGATIVE MG/DL
SP GR UR REFRACTOMETRY: 1.02 (ref 1–1.03)
TSH SERPL DL<=0.05 MIU/L-ACNC: 2.82 UIU/ML (ref 0.36–3.74)
UROBILINOGEN UR QL STRIP.AUTO: 1 EU/DL (ref 0.2–1)

## 2024-03-06 PROCEDURE — 3074F SYST BP LT 130 MM HG: CPT | Performed by: INTERNAL MEDICINE

## 2024-03-06 PROCEDURE — G8427 DOCREV CUR MEDS BY ELIG CLIN: HCPCS | Performed by: INTERNAL MEDICINE

## 2024-03-06 PROCEDURE — 86038 ANTINUCLEAR ANTIBODIES: CPT

## 2024-03-06 PROCEDURE — 3078F DIAST BP <80 MM HG: CPT | Performed by: INTERNAL MEDICINE

## 2024-03-06 PROCEDURE — 81003 URINALYSIS AUTO W/O SCOPE: CPT

## 2024-03-06 PROCEDURE — 87086 URINE CULTURE/COLONY COUNT: CPT

## 2024-03-06 PROCEDURE — 36415 COLL VENOUS BLD VENIPUNCTURE: CPT

## 2024-03-06 PROCEDURE — G8484 FLU IMMUNIZE NO ADMIN: HCPCS | Performed by: INTERNAL MEDICINE

## 2024-03-06 PROCEDURE — 99215 OFFICE O/P EST HI 40 MIN: CPT | Performed by: INTERNAL MEDICINE

## 2024-03-06 PROCEDURE — G8417 CALC BMI ABV UP PARAM F/U: HCPCS | Performed by: INTERNAL MEDICINE

## 2024-03-06 PROCEDURE — 1036F TOBACCO NON-USER: CPT | Performed by: INTERNAL MEDICINE

## 2024-03-06 PROCEDURE — 85652 RBC SED RATE AUTOMATED: CPT

## 2024-03-06 PROCEDURE — 84443 ASSAY THYROID STIM HORMONE: CPT

## 2024-03-06 RX ORDER — LIDOCAINE 50 MG/G
1 PATCH TOPICAL DAILY
Qty: 30 PATCH | Refills: 1 | Status: SHIPPED | OUTPATIENT
Start: 2024-03-06 | End: 2024-05-05

## 2024-03-06 RX ORDER — OMEPRAZOLE 40 MG/1
40 CAPSULE, DELAYED RELEASE ORAL
Qty: 90 CAPSULE | Refills: 1 | Status: SHIPPED | OUTPATIENT
Start: 2024-03-06

## 2024-03-06 RX ORDER — TIOTROPIUM BROMIDE INHALATION SPRAY 1.56 UG/1
SPRAY, METERED RESPIRATORY (INHALATION)
COMMUNITY
Start: 2024-01-16

## 2024-03-06 RX ORDER — ALBUTEROL SULFATE 90 UG/1
AEROSOL, METERED RESPIRATORY (INHALATION)
COMMUNITY
Start: 2024-01-16

## 2024-03-06 NOTE — TELEPHONE ENCOUNTER
----- Message from Allan Samson sent at 3/6/2024  1:26 PM EST -----  Subject: Message to Provider    QUESTIONS  Information for Provider? Patient is not able to get her pain patches due   to they require a PA to be done.   ---------------------------------------------------------------------------  --------------  CALL BACK INFO  [phone]; OK to leave message on Meileleil,OK to respond with electronic   message via [employer] portal (only for patients who have registered [employer]   account)  ---------------------------------------------------------------------------  --------------  SCRIPT ANSWERS  Relationship to Patient? Self

## 2024-03-06 NOTE — PROGRESS NOTES
Assessment/ Plan:   Mena was seen today for follow-up chronic condition and referral - general.    Diagnoses and all orders for this visit:    PIKE (dyspnea on exertion)-advised to follow up with Pulmo as meds may need to be adjusted; pulmo HTN from scleroderma  -     TSH; Future    Rash-etio?  -     Sedimentation Rate; Future  -     JUAN by IFA w/Reflex; Future  -     External Referral To Dermatology    Vertigo-persistent; ?long Covid  -     MRI BRAIN W WO CONTRAST; Future    Essential (primary) hypertension-controlled, continue with present meds    Mid back pain  -     lidocaine (LIDODERM) 5 %; Place 1 patch onto the skin daily 12 hours on, 12 hours off.    Gastroesophageal reflux disease, unspecified whether esophagitis present  -     External Referral To Gastroenterology  -     omeprazole (PRILOSEC) 40 MG delayed release capsule; Take 1 capsule by mouth every morning (before breakfast)    Other fatigue  -     TSH; Future    Abnormal urine odor  -     Urinalysis; Future  -     Culture, Urine; Future    Screen for colon cancer  -     External Referral To Gastroenterology      HRT form filled out    After I reviewed labs, I am referring patient to rheum as her sxs could be explained by Scleroderma/systemic sclerosis-PIKE (?interstitial lung disease bec there is no pulmo Htn on recent echo), GERD, skin thickening, fatigue  Positive anti nucleolar antibody titer     Follow-up and Dispositions    Return in about 3 months (around 6/6/2024) for follow up, annual wellness visit.                 Chief Complaint   Patient presents with    Follow-up Chronic Condition    Referral - General     Patient wants a dermatologist referral       Pt is a 48 y.o. year old female who presents for follow up of her chronic medical problems    Health Maintenance Due   Topic Date Due    Hepatitis B vaccine (1 of 3 - 3-dose series) Never done    DTaP/Tdap/Td vaccine (1 - Tdap) Never done    Colorectal Cancer Screen  Never done    Cervical

## 2024-03-06 NOTE — PROGRESS NOTES
\"Have you been to the ER, urgent care clinic since your last visit?  Hospitalized since your last visit?\"    NO    “Have you seen or consulted any other health care providers outside of [facility] since your last visit?”    NO    “Have you had a colorectal cancer screening such as a colonoscopy/FIT/Cologuard?    NO      “Have you had a pap smear?”    NO

## 2024-03-07 LAB
BACTERIA SPEC CULT: NORMAL
SERVICE CMNT-IMP: NORMAL

## 2024-03-09 LAB
ANA TITR SER IF: NORMAL {TITER}
LABORATORY COMMENT REPORT: NORMAL

## 2024-03-11 DIAGNOSIS — R76.8 POSITIVE ANA (ANTINUCLEAR ANTIBODY): Primary | ICD-10-CM

## 2024-03-11 LAB
ANA NUCLEOLAR TITR SER: ABNORMAL {TITER}
ANA TITR SER IF: POSITIVE
CENTROMERE B AB SER-ACNC: <0.2 AI (ref 0–0.9)
CHROMATIN AB SERPL-ACNC: 0.2 AI (ref 0–0.9)
DSDNA AB SER-ACNC: <1 IU/ML (ref 0–9)
ENA JO1 AB SER-ACNC: <0.2 AI (ref 0–0.9)
ENA RNP AB SER-ACNC: 0.2 AI (ref 0–0.9)
ENA SCL70 AB SER-ACNC: <0.2 AI (ref 0–0.9)
ENA SM AB SER-ACNC: <0.2 AI (ref 0–0.9)
ENA SS-A AB SER-ACNC: <0.2 AI (ref 0–0.9)
ENA SS-B AB SER-ACNC: <0.2 AI (ref 0–0.9)
LABORATORY COMMENT REPORT: ABNORMAL
Lab: ABNORMAL
NOTE: ABNORMAL

## 2024-03-18 ENCOUNTER — TELEPHONE (OUTPATIENT)
Facility: CLINIC | Age: 49
End: 2024-03-18

## 2024-03-18 NOTE — TELEPHONE ENCOUNTER
Patient called to let Dr. Fuller know that she needs a para transit added to her paperwork that was approved by Dr. Moreira her pulmonary provider.  She also had concerns about her Gastroenterologist and Dermatologist referrals and referral appointments.  She wanted to know if she was covered under [employer] & her Medicaid/Medicare.  I informed her I would call the specialists to confirm if they received the referrals and give here a call back before the end of the day.  She also expressed that she needed help setting up her appointments.  I called the Dermatologist office and they confirmed that due to high volume they may have missed the fax for the referral so I requested that be resent by Dr. Fuller and the patient must set up their own appointment.  The gastroenterologist says they received the referral but the patient must set up her own appointment.  I called patient back and left a message giving her all this information.

## 2024-03-19 ENCOUNTER — TELEPHONE (OUTPATIENT)
Facility: CLINIC | Age: 49
End: 2024-03-19

## 2024-03-19 NOTE — TELEPHONE ENCOUNTER
----- Message from Kaylee Hare sent at 3/19/2024  3:20 PM EDT -----  Subject: Referral Request    Reason for referral request? dermatology for an rash  Provider patient wants to be referred to(if known):     Provider Phone Number(if known):[phone]    Additional Information for Provider? Alexandra from [facility] called in regarding   to patient's referral for dermatology however the referral was incomplete   an would need to re- fax to the provider in order for patient to schedule   first appointment   ---------------------------------------------------------------------------  --------------  CALL BACK INFO    [phone]; OK to leave message on voicemail  ---------------------------------------------------------------------------  --------------

## 2024-03-28 ENCOUNTER — OFFICE VISIT (OUTPATIENT)
Age: 49
End: 2024-03-28
Payer: MEDICARE

## 2024-03-28 ENCOUNTER — TELEPHONE (OUTPATIENT)
Facility: CLINIC | Age: 49
End: 2024-03-28

## 2024-03-28 VITALS — BODY MASS INDEX: 36.54 KG/M2 | HEIGHT: 70 IN | WEIGHT: 255.2 LBS

## 2024-03-28 DIAGNOSIS — R06.09 DOE (DYSPNEA ON EXERTION): ICD-10-CM

## 2024-03-28 DIAGNOSIS — I10 ESSENTIAL HYPERTENSION WITH GOAL BLOOD PRESSURE LESS THAN 140/90: Primary | ICD-10-CM

## 2024-03-28 DIAGNOSIS — R07.9 CHEST PAIN, UNSPECIFIED TYPE: ICD-10-CM

## 2024-03-28 PROCEDURE — G8484 FLU IMMUNIZE NO ADMIN: HCPCS | Performed by: INTERNAL MEDICINE

## 2024-03-28 PROCEDURE — 99214 OFFICE O/P EST MOD 30 MIN: CPT | Performed by: INTERNAL MEDICINE

## 2024-03-28 PROCEDURE — G8417 CALC BMI ABV UP PARAM F/U: HCPCS | Performed by: INTERNAL MEDICINE

## 2024-03-28 PROCEDURE — G8427 DOCREV CUR MEDS BY ELIG CLIN: HCPCS | Performed by: INTERNAL MEDICINE

## 2024-03-28 PROCEDURE — 1036F TOBACCO NON-USER: CPT | Performed by: INTERNAL MEDICINE

## 2024-03-28 RX ORDER — ARIPIPRAZOLE 15 MG/1
TABLET ORAL
COMMUNITY
Start: 2024-02-29

## 2024-03-28 RX ORDER — BUDESONIDE AND FORMOTEROL FUMARATE DIHYDRATE 160; 4.5 UG/1; UG/1
AEROSOL RESPIRATORY (INHALATION)
COMMUNITY
Start: 2024-01-23

## 2024-03-28 ASSESSMENT — PATIENT HEALTH QUESTIONNAIRE - PHQ9
SUM OF ALL RESPONSES TO PHQ QUESTIONS 1-9: 2
2. FEELING DOWN, DEPRESSED OR HOPELESS: SEVERAL DAYS
SUM OF ALL RESPONSES TO PHQ QUESTIONS 1-9: 2
SUM OF ALL RESPONSES TO PHQ9 QUESTIONS 1 & 2: 2
1. LITTLE INTEREST OR PLEASURE IN DOING THINGS: SEVERAL DAYS
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
## 2024-03-28 NOTE — TELEPHONE ENCOUNTER
I called the pt # on file and reached an unidentified VM box. I left a message for the patient to call us back in reference to the question of the referrals and where they were sent.  (She had called and spoken to Ellyn  late afternoon on 3/25/24 about this.)  I would send in her active mychart, but I do not see any activity in mychart by the patient.

## 2024-03-28 NOTE — PATIENT INSTRUCTIONS
New Medication/Medication Changes      **please allow 24-48 hrs for medication to be escribed to pharmacy** If you need any refills on medications please contact your pharmacy so that the request can be escribed to the provider for review seven to 10 days prior to being out of medication.
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
## 2024-04-02 ENCOUNTER — TELEPHONE (OUTPATIENT)
Facility: CLINIC | Age: 49
End: 2024-04-02

## 2024-04-02 NOTE — TELEPHONE ENCOUNTER
Pt called to get paratransit paperwork filled out and mailed to her so she could have her own copy and fill out her portion and send it back so she can have transportation to and from the doctor's office.  She wanted to mention to Dr. Fuller that she got authorization for her walker through her pulmonary provider Dr. Moreira and wanted that notated on her paperwork.

## 2024-04-17 ENCOUNTER — TELEPHONE (OUTPATIENT)
Facility: CLINIC | Age: 49
End: 2024-04-17

## 2024-04-29 ENCOUNTER — NURSE ONLY (OUTPATIENT)
Age: 49
End: 2024-04-29

## 2024-04-29 VITALS — DIASTOLIC BLOOD PRESSURE: 80 MMHG | HEART RATE: 103 BPM | SYSTOLIC BLOOD PRESSURE: 121 MMHG

## 2024-04-29 DIAGNOSIS — I10 ESSENTIAL (PRIMARY) HYPERTENSION: Primary | ICD-10-CM

## 2024-04-29 RX ORDER — TACROLIMUS 1 MG/G
OINTMENT TOPICAL
COMMUNITY
Start: 2024-04-07

## 2024-04-29 RX ORDER — BUMETANIDE 1 MG/1
TABLET ORAL
COMMUNITY
Start: 2024-04-04

## 2024-04-29 RX ORDER — METHYLPREDNISOLONE 4 MG/1
TABLET ORAL
COMMUNITY
Start: 2024-04-04

## 2024-04-29 RX ORDER — BUMETANIDE 1 MG/1
TABLET ORAL
Qty: 30 TABLET | Refills: 3 | Status: CANCELLED | OUTPATIENT
Start: 2024-04-29

## 2024-04-29 RX ORDER — VALSARTAN AND HYDROCHLOROTHIAZIDE 80; 12.5 MG/1; MG/1
TABLET, FILM COATED ORAL
COMMUNITY
Start: 2024-04-11

## 2024-04-29 RX ORDER — TRIAMCINOLONE ACETONIDE 1 MG/G
CREAM TOPICAL
COMMUNITY
Start: 2024-03-29

## 2024-04-29 NOTE — TELEPHONE ENCOUNTER
PCP: Nevaeh Ramsey MD    Last appt:  3/28/2024   Future Appointments   Date Time Provider Department Center   6/18/2024 10:00 AM Nevaeh Ramsey MD [facility] BS AMB   7/25/2024 11:00 AM Cuca Robles PA-C [facility] AMB       Requested Prescriptions     Pending Prescriptions Disp Refills    bumetanide (BUMEX) 1 MG tablet 30 tablet 3       Request for a 30 or 90 day supply? Provider Discretion    Pharmacy: confirm    Other Comments: n/a

## 2024-04-29 NOTE — PROGRESS NOTES
PCP: Nevaeh Ramsey MD    Last appt:  Visit date not found   Future Appointments   Date Time Provider Department Center   4/29/2024 10:00 AM CAG NURSE [facility]   6/18/2024 10:00 AM Nevaeh Ramsey MD [facility] AMB   7/25/2024 11:00 AM Cuca Robles PA-C [facility] AMB       Requested Prescriptions     Pending Prescriptions Disp Refills    bumetanide (BUMEX) 1 MG tablet 30 tablet 3       Request for a 30 or 90 day supply? Provider Discretion    Pharmacy: confirm    Other Comments: n/a  
hours with any changes. Patient verbalized understanding.

## 2024-05-01 RX ORDER — BUMETANIDE 1 MG/1
TABLET ORAL
Qty: 30 TABLET | Refills: 3 | OUTPATIENT
Start: 2024-05-01

## 2024-06-18 ENCOUNTER — OFFICE VISIT (OUTPATIENT)
Facility: CLINIC | Age: 49
End: 2024-06-18
Payer: MEDICARE

## 2024-06-18 VITALS
WEIGHT: 249.8 LBS | OXYGEN SATURATION: 97 % | BODY MASS INDEX: 35.76 KG/M2 | SYSTOLIC BLOOD PRESSURE: 123 MMHG | HEART RATE: 74 BPM | DIASTOLIC BLOOD PRESSURE: 82 MMHG | HEIGHT: 70 IN | RESPIRATION RATE: 16 BRPM

## 2024-06-18 DIAGNOSIS — M25.511 ACUTE PAIN OF RIGHT SHOULDER: ICD-10-CM

## 2024-06-18 DIAGNOSIS — E66.01 SEVERE OBESITY (BMI 35.0-39.9) WITH COMORBIDITY (HCC): ICD-10-CM

## 2024-06-18 DIAGNOSIS — K21.00 GASTROESOPHAGEAL REFLUX DISEASE WITH ESOPHAGITIS WITHOUT HEMORRHAGE: ICD-10-CM

## 2024-06-18 DIAGNOSIS — R42 VERTIGO: ICD-10-CM

## 2024-06-18 DIAGNOSIS — Z00.00 MEDICARE ANNUAL WELLNESS VISIT, SUBSEQUENT: Primary | ICD-10-CM

## 2024-06-18 DIAGNOSIS — F33.2 MAJOR DEPRESSIVE DISORDER, RECURRENT SEVERE WITHOUT PSYCHOTIC FEATURES (HCC): ICD-10-CM

## 2024-06-18 DIAGNOSIS — M34.9 SYSTEMIC SCLEROSIS (HCC): ICD-10-CM

## 2024-06-18 DIAGNOSIS — M79.671 RIGHT FOOT PAIN: ICD-10-CM

## 2024-06-18 DIAGNOSIS — R73.01 IMPAIRED FASTING GLUCOSE: ICD-10-CM

## 2024-06-18 DIAGNOSIS — I10 ESSENTIAL (PRIMARY) HYPERTENSION: ICD-10-CM

## 2024-06-18 LAB — HBA1C MFR BLD: 6.2 %

## 2024-06-18 PROCEDURE — G8417 CALC BMI ABV UP PARAM F/U: HCPCS | Performed by: INTERNAL MEDICINE

## 2024-06-18 PROCEDURE — 83036 HEMOGLOBIN GLYCOSYLATED A1C: CPT | Performed by: INTERNAL MEDICINE

## 2024-06-18 PROCEDURE — 3074F SYST BP LT 130 MM HG: CPT | Performed by: INTERNAL MEDICINE

## 2024-06-18 PROCEDURE — 1036F TOBACCO NON-USER: CPT | Performed by: INTERNAL MEDICINE

## 2024-06-18 PROCEDURE — G0439 PPPS, SUBSEQ VISIT: HCPCS | Performed by: INTERNAL MEDICINE

## 2024-06-18 PROCEDURE — 99215 OFFICE O/P EST HI 40 MIN: CPT | Performed by: INTERNAL MEDICINE

## 2024-06-18 PROCEDURE — G8427 DOCREV CUR MEDS BY ELIG CLIN: HCPCS | Performed by: INTERNAL MEDICINE

## 2024-06-18 PROCEDURE — 3079F DIAST BP 80-89 MM HG: CPT | Performed by: INTERNAL MEDICINE

## 2024-06-18 RX ORDER — AMLODIPINE BESYLATE 5 MG/1
5 TABLET ORAL DAILY
Qty: 30 TABLET | Refills: 3 | Status: SHIPPED | OUTPATIENT
Start: 2024-06-18

## 2024-06-18 RX ORDER — OMEPRAZOLE 40 MG/1
40 CAPSULE, DELAYED RELEASE ORAL 2 TIMES DAILY
Qty: 180 CAPSULE | Refills: 0 | Status: SHIPPED | OUTPATIENT
Start: 2024-06-18

## 2024-06-18 RX ORDER — MYCOPHENOLATE MOFETIL 500 MG/1
500 TABLET ORAL 2 TIMES DAILY
COMMUNITY
Start: 2024-06-14

## 2024-06-18 ASSESSMENT — PATIENT HEALTH QUESTIONNAIRE - PHQ9
SUM OF ALL RESPONSES TO PHQ9 QUESTIONS 1 & 2: 6
7. TROUBLE CONCENTRATING ON THINGS, SUCH AS READING THE NEWSPAPER OR WATCHING TELEVISION: MORE THAN HALF THE DAYS
1. LITTLE INTEREST OR PLEASURE IN DOING THINGS: NEARLY EVERY DAY
SUM OF ALL RESPONSES TO PHQ QUESTIONS 1-9: 14
2. FEELING DOWN, DEPRESSED OR HOPELESS: NEARLY EVERY DAY
SUM OF ALL RESPONSES TO PHQ QUESTIONS 1-9: 14
SUM OF ALL RESPONSES TO PHQ QUESTIONS 1-9: 14
9. THOUGHTS THAT YOU WOULD BE BETTER OFF DEAD, OR OF HURTING YOURSELF: NOT AT ALL
3. TROUBLE FALLING OR STAYING ASLEEP: NEARLY EVERY DAY
4. FEELING TIRED OR HAVING LITTLE ENERGY: NEARLY EVERY DAY
6. FEELING BAD ABOUT YOURSELF - OR THAT YOU ARE A FAILURE OR HAVE LET YOURSELF OR YOUR FAMILY DOWN: NOT AT ALL
5. POOR APPETITE OR OVEREATING: NOT AT ALL
8. MOVING OR SPEAKING SO SLOWLY THAT OTHER PEOPLE COULD HAVE NOTICED. OR THE OPPOSITE, BEING SO FIGETY OR RESTLESS THAT YOU HAVE BEEN MOVING AROUND A LOT MORE THAN USUAL: NOT AT ALL
10. IF YOU CHECKED OFF ANY PROBLEMS, HOW DIFFICULT HAVE THESE PROBLEMS MADE IT FOR YOU TO DO YOUR WORK, TAKE CARE OF THINGS AT HOME, OR GET ALONG WITH OTHER PEOPLE: EXTREMELY DIFFICULT
SUM OF ALL RESPONSES TO PHQ QUESTIONS 1-9: 14

## 2024-06-18 ASSESSMENT — LIFESTYLE VARIABLES
HOW MANY STANDARD DRINKS CONTAINING ALCOHOL DO YOU HAVE ON A TYPICAL DAY: PATIENT DOES NOT DRINK
HOW OFTEN DO YOU HAVE A DRINK CONTAINING ALCOHOL: NEVER

## 2024-06-18 NOTE — PATIENT INSTRUCTIONS
nicotine products. This includes smoking and vaping. If you need help quitting, talk to your doctor about stop-smoking programs and medicines. These can increase your chances of quitting for good. Quitting is one of the most important things you can do to protect your heart. It is never too late to quit. Try to avoid secondhand smoke too.     Stay at a weight that's healthy for you. Talk to your doctor if you need help losing weight.     Try to get 7 to 9 hours of sleep each night.     Limit alcohol to 2 drinks a day for men and 1 drink a day for women. Too much alcohol can cause health problems.     Manage other health problems such as diabetes, high blood pressure, and high cholesterol. If you think you may have a problem with alcohol or drug use, talk to your doctor.   Medicines    Take your medicines exactly as prescribed. Call your doctor if you think you are having a problem with your medicine.     If your doctor recommends aspirin, take the amount directed each day. Make sure you take aspirin and not another kind of pain reliever, such as acetaminophen (Tylenol).   When should you call for help?   Call 911 if you have symptoms of a heart attack. These may include:    Chest pain or pressure, or a strange feeling in the chest.     Sweating.     Shortness of breath.     Pain, pressure, or a strange feeling in the back, neck, jaw, or upper belly or in one or both shoulders or arms.     Lightheadedness or sudden weakness.     A fast or irregular heartbeat.   After you call 911, the  may tell you to chew 1 adult-strength or 2 to 4 low-dose aspirin. Wait for an ambulance. Do not try to drive yourself.  Watch closely for changes in your health, and be sure to contact your doctor if you have any problems.  Where can you learn more?  Go to https://www.healthwise.net/patientEd and enter F075 to learn more about \"A Healthy Heart: Care Instructions.\"  Current as of: June 24, 2023  Content Version: 14.1  ©

## 2024-06-18 NOTE — PROGRESS NOTES
\"Have you been to the ER, urgent care clinic since your last visit?  Hospitalized since your last visit?\"    NO    “Have you seen or consulted any other health care providers outside of [facility] since your last visit?”    Yes- Dermatologist, Rheumatologist GI     “Have you had a pap smear?”    NO    Date of last Cervical Cancer screen (HPV or PAP): 7/6/2017         “Have you had a colorectal cancer screening such as a colonoscopy/FIT/Cologuard?    NO    No colonoscopy on file  No cologuard on file  No FIT/FOBT on file   No flexible sigmoidoscopy on file         Click Here for Release of Records Request    
Total Score: 14    Interpretation:  5-9 mild   10-14 moderate   15-19 moderately severe   20-27 severe     Interventions:  Monitored by specialist. No acute findings meriting change in the plan         Self-assessment of health:  In general, how would you say your health is?: (!) Poor    Interventions:  See AVS for additional education material    General HRA Questions:  Select all that apply: (!) New or Increased Pain, New or Increased Fatigue, Loneliness, Social Isolation    Pain Interventions:  See AVS for additional education material    Fatigue Interventions:  See AVS for additional education material    Loneliness Interventions:  See AVS for additional education material    Social Isolation Interventions:  See AVS for additional education material      Activity, Diet, and Weight:  On average, how many days per week do you engage in moderate to strenuous exercise (like a brisk walk)?: 2 days  On average, how many minutes do you engage in exercise at this level?: 10 min    Do you eat balanced/healthy meals regularly?: Yes    Body mass index is 35.84 kg/m². (!) Abnormal    Obesity Interventions:  See AVS for additional education material               Safety:  Do you have non-slip mats or non-slip surfaces or shower bars or grab bars in your shower or bathtub?: (!) No  Interventions:  See AVS for additional education material    ADL's:   Patient reports needing help with:  Select all that apply: (!) Eating, Dressing, Grooming, Bathing, Toileting, Walking/Balance  Interventions:  See AVS for additional education material    Advanced Directives:  Do you have a Living Will?: (!) No    Intervention:                       Objective   Vitals:    06/18/24 1003   BP: 123/82   Pulse: 74   Resp: 16   SpO2: 97%   Weight: 113.3 kg (249 lb 12.8 oz)   Height: 1.778 m (5' 10\")      Body mass index is 35.84 kg/m².   Exam:   Appearance: alert, well appearing,  oriented to person, place, and time, acyanotic, in no respiratory

## 2024-06-19 PROBLEM — R42 VERTIGO: Status: ACTIVE | Noted: 2024-06-19

## 2024-06-20 ENCOUNTER — TELEPHONE (OUTPATIENT)
Facility: CLINIC | Age: 49
End: 2024-06-20

## 2024-06-20 NOTE — TELEPHONE ENCOUNTER
Patient stated that the Rx for Diclofenac Sodium 1% Gel is not covered by her insurance, can you give Extra Tylenol instead.    Patient is also, following up on the order to [facility] for her scooter.

## 2024-09-18 ENCOUNTER — OFFICE VISIT (OUTPATIENT)
Facility: CLINIC | Age: 49
End: 2024-09-18
Payer: MEDICARE

## 2024-09-18 VITALS
SYSTOLIC BLOOD PRESSURE: 128 MMHG | HEART RATE: 78 BPM | OXYGEN SATURATION: 98 % | DIASTOLIC BLOOD PRESSURE: 83 MMHG | HEIGHT: 70 IN | BODY MASS INDEX: 34.44 KG/M2 | WEIGHT: 240.6 LBS | RESPIRATION RATE: 16 BRPM

## 2024-09-18 DIAGNOSIS — R73.01 IMPAIRED FASTING GLUCOSE: ICD-10-CM

## 2024-09-18 DIAGNOSIS — E04.2 MULTINODULAR THYROID: ICD-10-CM

## 2024-09-18 DIAGNOSIS — M34.9 SYSTEMIC SCLEROSIS (HCC): Primary | ICD-10-CM

## 2024-09-18 DIAGNOSIS — I10 ESSENTIAL (PRIMARY) HYPERTENSION: ICD-10-CM

## 2024-09-18 DIAGNOSIS — K21.00 GASTROESOPHAGEAL REFLUX DISEASE WITH ESOPHAGITIS WITHOUT HEMORRHAGE: ICD-10-CM

## 2024-09-18 LAB — HBA1C MFR BLD: 6 %

## 2024-09-18 PROCEDURE — 1036F TOBACCO NON-USER: CPT | Performed by: INTERNAL MEDICINE

## 2024-09-18 PROCEDURE — 83036 HEMOGLOBIN GLYCOSYLATED A1C: CPT | Performed by: INTERNAL MEDICINE

## 2024-09-18 PROCEDURE — G8417 CALC BMI ABV UP PARAM F/U: HCPCS | Performed by: INTERNAL MEDICINE

## 2024-09-18 PROCEDURE — 3074F SYST BP LT 130 MM HG: CPT | Performed by: INTERNAL MEDICINE

## 2024-09-18 PROCEDURE — 3079F DIAST BP 80-89 MM HG: CPT | Performed by: INTERNAL MEDICINE

## 2024-09-18 PROCEDURE — 99214 OFFICE O/P EST MOD 30 MIN: CPT | Performed by: INTERNAL MEDICINE

## 2024-09-18 PROCEDURE — 3017F COLORECTAL CA SCREEN DOC REV: CPT | Performed by: INTERNAL MEDICINE

## 2024-09-18 PROCEDURE — G8427 DOCREV CUR MEDS BY ELIG CLIN: HCPCS | Performed by: INTERNAL MEDICINE

## 2024-09-18 RX ORDER — DEXLANSOPRAZOLE 60 MG/1
60 CAPSULE, DELAYED RELEASE ORAL DAILY
Qty: 90 CAPSULE | Refills: 1 | Status: SHIPPED | OUTPATIENT
Start: 2024-09-18

## 2024-09-18 SDOH — ECONOMIC STABILITY: FOOD INSECURITY: WITHIN THE PAST 12 MONTHS, THE FOOD YOU BOUGHT JUST DIDN'T LAST AND YOU DIDN'T HAVE MONEY TO GET MORE.: NEVER TRUE

## 2024-09-18 SDOH — ECONOMIC STABILITY: FOOD INSECURITY: WITHIN THE PAST 12 MONTHS, YOU WORRIED THAT YOUR FOOD WOULD RUN OUT BEFORE YOU GOT MONEY TO BUY MORE.: NEVER TRUE

## 2024-09-18 SDOH — ECONOMIC STABILITY: INCOME INSECURITY: HOW HARD IS IT FOR YOU TO PAY FOR THE VERY BASICS LIKE FOOD, HOUSING, MEDICAL CARE, AND HEATING?: NOT HARD AT ALL

## 2024-09-20 ENCOUNTER — TELEPHONE (OUTPATIENT)
Facility: CLINIC | Age: 49
End: 2024-09-20

## 2024-09-20 NOTE — TELEPHONE ENCOUNTER
Buffy Valdivia,    Mrs. Bentley said that Mrs. Oakley had paperwork for a scooter but the company that she was going to order her scooter does not have them anymore. She has a new company who do have the scooter. The company is [employer] and they would like for you to send copy of her prescription, facesheet, last 6 months of progress notes and insurance card. Thanks    Phone - [phone]  Fax - [phone]

## 2024-09-25 ENCOUNTER — TELEPHONE (OUTPATIENT)
Facility: CLINIC | Age: 49
End: 2024-09-25

## 2024-09-25 NOTE — TELEPHONE ENCOUNTER
Buffy Valdivia,     Mrs. Bentley called talking about wheelchair or scooter for her to get around and she said that she put paperwork for this and she has not heard anything about it. Will you please check for me and give her a call. Thanks

## 2024-10-21 DIAGNOSIS — K21.00 GASTROESOPHAGEAL REFLUX DISEASE WITH ESOPHAGITIS WITHOUT HEMORRHAGE: ICD-10-CM

## 2024-10-21 RX ORDER — OMEPRAZOLE 40 MG/1
40 CAPSULE, DELAYED RELEASE ORAL 2 TIMES DAILY
Qty: 180 CAPSULE | Refills: 0 | Status: SHIPPED | OUTPATIENT
Start: 2024-10-21

## 2024-11-09 LAB — MAMMOGRAPHY, EXTERNAL: NORMAL

## 2024-11-26 DIAGNOSIS — I10 ESSENTIAL (PRIMARY) HYPERTENSION: ICD-10-CM

## 2024-11-26 RX ORDER — AMLODIPINE BESYLATE 5 MG/1
5 TABLET ORAL DAILY
Qty: 90 TABLET | Refills: 0 | Status: SHIPPED | OUTPATIENT
Start: 2024-11-26

## 2025-01-14 ENCOUNTER — COMMUNITY OUTREACH (OUTPATIENT)
Facility: CLINIC | Age: 50
End: 2025-01-14

## 2025-01-14 NOTE — PROGRESS NOTES
Patient's HM shows they are overdue for Colorectal Screening.   Care Everywhere and  files searched.  No results to attach to order nor HM updated.     External referral placed, no follow up with GI on file.

## 2025-01-23 ENCOUNTER — OFFICE VISIT (OUTPATIENT)
Facility: CLINIC | Age: 50
End: 2025-01-23
Payer: MEDICAID

## 2025-01-23 VITALS
OXYGEN SATURATION: 95 % | WEIGHT: 234 LBS | RESPIRATION RATE: 16 BRPM | SYSTOLIC BLOOD PRESSURE: 111 MMHG | TEMPERATURE: 98.6 F | HEART RATE: 94 BPM | DIASTOLIC BLOOD PRESSURE: 76 MMHG | BODY MASS INDEX: 33.5 KG/M2 | HEIGHT: 70 IN

## 2025-01-23 DIAGNOSIS — F33.2 MAJOR DEPRESSIVE DISORDER, RECURRENT SEVERE WITHOUT PSYCHOTIC FEATURES (HCC): ICD-10-CM

## 2025-01-23 DIAGNOSIS — K21.00 GASTROESOPHAGEAL REFLUX DISEASE WITH ESOPHAGITIS WITHOUT HEMORRHAGE: ICD-10-CM

## 2025-01-23 DIAGNOSIS — M34.9 SYSTEMIC SCLEROSIS (HCC): Primary | ICD-10-CM

## 2025-01-23 DIAGNOSIS — E04.2 MULTINODULAR THYROID: ICD-10-CM

## 2025-01-23 DIAGNOSIS — J45.40 MODERATE PERSISTENT ASTHMA, UNSPECIFIED WHETHER COMPLICATED: ICD-10-CM

## 2025-01-23 DIAGNOSIS — I10 ESSENTIAL (PRIMARY) HYPERTENSION: ICD-10-CM

## 2025-01-23 PROBLEM — I20.0 INTERMEDIATE CORONARY SYNDROME (HCC): Status: RESOLVED | Noted: 2024-02-28 | Resolved: 2025-01-23

## 2025-01-23 PROBLEM — R42 VERTIGO: Status: RESOLVED | Noted: 2024-06-19 | Resolved: 2025-01-23

## 2025-01-23 PROBLEM — E66.01 SEVERE OBESITY (BMI 35.0-39.9) WITH COMORBIDITY: Status: RESOLVED | Noted: 2018-05-03 | Resolved: 2025-01-23

## 2025-01-23 PROCEDURE — 3074F SYST BP LT 130 MM HG: CPT | Performed by: INTERNAL MEDICINE

## 2025-01-23 PROCEDURE — 3078F DIAST BP <80 MM HG: CPT | Performed by: INTERNAL MEDICINE

## 2025-01-23 PROCEDURE — 99214 OFFICE O/P EST MOD 30 MIN: CPT | Performed by: INTERNAL MEDICINE

## 2025-01-23 RX ORDER — ALBUTEROL SULFATE 0.83 MG/ML
2.5 SOLUTION RESPIRATORY (INHALATION) EVERY 6 HOURS PRN
Qty: 120 EACH | Refills: 3 | Status: SHIPPED | OUTPATIENT
Start: 2025-01-23

## 2025-01-23 SDOH — ECONOMIC STABILITY: FOOD INSECURITY: WITHIN THE PAST 12 MONTHS, YOU WORRIED THAT YOUR FOOD WOULD RUN OUT BEFORE YOU GOT MONEY TO BUY MORE.: NEVER TRUE

## 2025-01-23 SDOH — ECONOMIC STABILITY: FOOD INSECURITY: WITHIN THE PAST 12 MONTHS, THE FOOD YOU BOUGHT JUST DIDN'T LAST AND YOU DIDN'T HAVE MONEY TO GET MORE.: NEVER TRUE

## 2025-01-23 ASSESSMENT — PATIENT HEALTH QUESTIONNAIRE - PHQ9
9. THOUGHTS THAT YOU WOULD BE BETTER OFF DEAD, OR OF HURTING YOURSELF: NOT AT ALL
1. LITTLE INTEREST OR PLEASURE IN DOING THINGS: SEVERAL DAYS
SUM OF ALL RESPONSES TO PHQ QUESTIONS 1-9: 8
4. FEELING TIRED OR HAVING LITTLE ENERGY: NEARLY EVERY DAY
7. TROUBLE CONCENTRATING ON THINGS, SUCH AS READING THE NEWSPAPER OR WATCHING TELEVISION: NOT AT ALL
3. TROUBLE FALLING OR STAYING ASLEEP: NEARLY EVERY DAY
8. MOVING OR SPEAKING SO SLOWLY THAT OTHER PEOPLE COULD HAVE NOTICED. OR THE OPPOSITE, BEING SO FIGETY OR RESTLESS THAT YOU HAVE BEEN MOVING AROUND A LOT MORE THAN USUAL: NOT AT ALL
2. FEELING DOWN, DEPRESSED OR HOPELESS: SEVERAL DAYS
SUM OF ALL RESPONSES TO PHQ9 QUESTIONS 1 & 2: 2
10. IF YOU CHECKED OFF ANY PROBLEMS, HOW DIFFICULT HAVE THESE PROBLEMS MADE IT FOR YOU TO DO YOUR WORK, TAKE CARE OF THINGS AT HOME, OR GET ALONG WITH OTHER PEOPLE: VERY DIFFICULT
SUM OF ALL RESPONSES TO PHQ QUESTIONS 1-9: 8
5. POOR APPETITE OR OVEREATING: NOT AT ALL

## 2025-01-23 NOTE — PROGRESS NOTES
\"Have you been to the ER, urgent care clinic since your last visit?  Hospitalized since your last visit?\"    NO    “Have you seen or consulted any other health care providers outside our system since your last visit?”    Yes- [facility] weeks ago, Dr. Braden Rheumatology   “Have you had a pap smear?”    NO    Date of last Cervical Cancer screen (HPV or PAP): 7/6/2017       “Have you had a colorectal cancer screening such as a colonoscopy/FIT/Cologuard?    NO- endoscopy in March 2024 and office is to call patient and schedule colonoscopy.    No colonoscopy on file  No cologuard on file  No FIT/FOBT on file   No flexible sigmoidoscopy on file

## 2025-01-23 NOTE — PATIENT INSTRUCTIONS
[facility] Transportation Resources*  (Call [facility]/211 if need more resources.)    [facility] Services [facility]  What they offer: Senior Services [facility] [employer] Transit offers fixed routes, medical transportation, and on-demand response transit for those age 60+.  Accepts donations for regular service.  Fare: Approximately $4.00 each way  Phone Number: [phone]  Website: https://www.[employer]    [facility] Paratransit  What they offer: In compliance with the American Disabilities Act, [facility] Transit provides a shared ride, advanced reservation, origin to destination service for disabled individuals who are unable to use regular fixed route public transportation services because of their disabilities.   Phone Number: [phone]  Apply online: https://www.[employer]/TransitAgencyChoice.aspx     Medicare Advantage Plans  Some plans may provide transportation. Members should contact the Member Services or Transportation number on the back of their insurance card for more information or to schedule transportation.    Medicaid Plans - [facility]) Plus     Each health plan has options for transportation services. Contact your insurance provider to schedule transportation. Transportation or Member Services contact information is listed on the back of the insurance card.         Insurance Contacts     o [employer] [facility]   Phone: [phone]   Website:  https://www."[email or web address]ail [place]    o [employer] [facility] Plus   Phone: 1-426.701.8243  Website: https://www.[employer]/vamedicaid    o [employer] Complete Care   Phone: [phone]  Website: [email or web address].[employer].Abigail Stewart/members    o [facility] Plans   Phone: 1-835.363.1243 or 1-607.279.8728   Website: [email or web address].[employer].Abigail Stewart/communitycare    o [employer] Community Plan   Phone: [phone]  Website: [email or web address]://www.[facility].Abigail Stewart/[place]

## 2025-01-23 NOTE — PROGRESS NOTES
Assessment/ Plan:   Mena \"Nikki\" was seen today for follow-up chronic condition.    Diagnoses and all orders for this visit:    Systemic sclerosis (HCC)-Rheum following; on Cellcept  Getting ready to go for an appt for possible clinical trial to delay progression of disease  May need lung transplant in the future  Power wheelchair rx sent bec of PIKE and pain everywhere    Essential (primary) hypertension-controlled, continue with Norvasc    Major depressive disorder, recurrent severe without psychotic features (HCC)-declines any meds as it may preclude her from participating in the clinical trial; continue with online counseling    Multinodular thyroid-s/p neg biopsy    Gastroesophageal reflux disease with esophagitis without hemorrhage-on BID PPI from GI    Moderate persistent asthma, unspecified whether complicated-advised compliance with Advair BID  -     albuterol (PROVENTIL) (2.5 MG/3ML) 0.083% nebulizer solution; Take 3 mLs by nebulization every 6 hours as needed for Wheezing    Advised to get Tdap, second Shingrix  Call GI for colonoscopy appt and repeat EGD    Ask clinical trial doc if she can get pain meds c/o Pain Mgt    Call gyn for PAP        Follow-up and Dispositions    Return in about 6 months (around 7/23/2025) for follow up.                 Chief Complaint   Patient presents with    Follow-up Chronic Condition       Pt is a 49 y.o. year old female who presents for follow up of her chronic medical problems    Health Maintenance Due   Topic Date Due    Pneumococcal 0-64 years Vaccine (1 of 2 - PCV) Never done    Hepatitis B vaccine (1 of 3 - 19+ 3-dose series) Never done    DTaP/Tdap/Td vaccine (1 - Tdap)-at her pharmacy Never done    Shingles vaccine (1 of 2)-one done Never done    Colorectal Cancer Screen -will ask GI Never done    Cervical cancer screen -used to see Dr Boss-she will call her for an appt 07/06/2022    COVID-19 Vaccine (4 - 2023-24 season) 09/01/2024      Wt Readings from Last

## 2025-01-27 DIAGNOSIS — K21.00 GASTROESOPHAGEAL REFLUX DISEASE WITH ESOPHAGITIS WITHOUT HEMORRHAGE: ICD-10-CM

## 2025-01-27 RX ORDER — OMEPRAZOLE 40 MG/1
40 CAPSULE, DELAYED RELEASE ORAL 2 TIMES DAILY
Qty: 180 CAPSULE | Refills: 0 | Status: SHIPPED | OUTPATIENT
Start: 2025-01-27

## 2025-02-27 DIAGNOSIS — M34.9 SYSTEMIC SCLEROSIS (HCC): Primary | ICD-10-CM

## 2025-04-15 DIAGNOSIS — I10 ESSENTIAL (PRIMARY) HYPERTENSION: ICD-10-CM

## 2025-04-15 RX ORDER — AMLODIPINE BESYLATE 5 MG/1
5 TABLET ORAL DAILY
Qty: 90 TABLET | Refills: 0 | OUTPATIENT
Start: 2025-04-15

## 2025-04-15 RX ORDER — AMLODIPINE BESYLATE 5 MG/1
5 TABLET ORAL DAILY
Qty: 90 TABLET | Refills: 0 | Status: SHIPPED | OUTPATIENT
Start: 2025-04-15
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
## 2025-07-16 ENCOUNTER — OFFICE VISIT (OUTPATIENT)
Facility: CLINIC | Age: 50
End: 2025-07-16
Payer: MEDICAID

## 2025-07-16 VITALS
WEIGHT: 228.6 LBS | BODY MASS INDEX: 32.73 KG/M2 | SYSTOLIC BLOOD PRESSURE: 138 MMHG | HEIGHT: 70 IN | RESPIRATION RATE: 18 BRPM | OXYGEN SATURATION: 90 % | DIASTOLIC BLOOD PRESSURE: 80 MMHG | HEART RATE: 90 BPM | TEMPERATURE: 98.5 F

## 2025-07-16 DIAGNOSIS — K21.9 GASTROESOPHAGEAL REFLUX DISEASE, UNSPECIFIED WHETHER ESOPHAGITIS PRESENT: ICD-10-CM

## 2025-07-16 DIAGNOSIS — I10 ESSENTIAL (PRIMARY) HYPERTENSION: ICD-10-CM

## 2025-07-16 DIAGNOSIS — J45.40 MODERATE PERSISTENT ASTHMA, UNSPECIFIED WHETHER COMPLICATED: ICD-10-CM

## 2025-07-16 DIAGNOSIS — R73.03 PREDIABETES: ICD-10-CM

## 2025-07-16 DIAGNOSIS — J84.9 INTERSTITIAL LUNG DISEASE (HCC): ICD-10-CM

## 2025-07-16 DIAGNOSIS — M34.9 SYSTEMIC SCLEROSIS (HCC): Primary | ICD-10-CM

## 2025-07-16 DIAGNOSIS — E04.2 MULTINODULAR THYROID: ICD-10-CM

## 2025-07-16 DIAGNOSIS — F33.2 MAJOR DEPRESSIVE DISORDER, RECURRENT SEVERE WITHOUT PSYCHOTIC FEATURES (HCC): ICD-10-CM

## 2025-07-16 PROCEDURE — 3075F SYST BP GE 130 - 139MM HG: CPT | Performed by: INTERNAL MEDICINE

## 2025-07-16 PROCEDURE — 3079F DIAST BP 80-89 MM HG: CPT | Performed by: INTERNAL MEDICINE

## 2025-07-16 PROCEDURE — 99215 OFFICE O/P EST HI 40 MIN: CPT | Performed by: INTERNAL MEDICINE

## 2025-07-16 RX ORDER — VALSARTAN AND HYDROCHLOROTHIAZIDE 80; 12.5 MG/1; MG/1
1 TABLET, FILM COATED ORAL DAILY
Qty: 90 TABLET | Refills: 1 | Status: SHIPPED | OUTPATIENT
Start: 2025-07-16

## 2025-07-16 RX ORDER — ESOMEPRAZOLE MAGNESIUM 40 MG/1
40 CAPSULE, DELAYED RELEASE ORAL 2 TIMES DAILY
Qty: 180 CAPSULE | Refills: 1 | Status: SHIPPED | OUTPATIENT
Start: 2025-07-16

## 2025-07-16 RX ORDER — ALBUTEROL SULFATE 0.83 MG/ML
2.5 SOLUTION RESPIRATORY (INHALATION) EVERY 6 HOURS PRN
Qty: 120 EACH | Refills: 3 | Status: SHIPPED | OUTPATIENT
Start: 2025-07-16

## 2025-07-16 SDOH — ECONOMIC STABILITY: FOOD INSECURITY: WITHIN THE PAST 12 MONTHS, THE FOOD YOU BOUGHT JUST DIDN'T LAST AND YOU DIDN'T HAVE MONEY TO GET MORE.: OFTEN TRUE

## 2025-07-16 SDOH — ECONOMIC STABILITY: FOOD INSECURITY: WITHIN THE PAST 12 MONTHS, YOU WORRIED THAT YOUR FOOD WOULD RUN OUT BEFORE YOU GOT MONEY TO BUY MORE.: OFTEN TRUE

## 2025-07-16 ASSESSMENT — PATIENT HEALTH QUESTIONNAIRE - PHQ9
SUM OF ALL RESPONSES TO PHQ QUESTIONS 1-9: 14
5. POOR APPETITE OR OVEREATING: SEVERAL DAYS
3. TROUBLE FALLING OR STAYING ASLEEP: NEARLY EVERY DAY
1. LITTLE INTEREST OR PLEASURE IN DOING THINGS: SEVERAL DAYS
4. FEELING TIRED OR HAVING LITTLE ENERGY: NEARLY EVERY DAY
9. THOUGHTS THAT YOU WOULD BE BETTER OFF DEAD, OR OF HURTING YOURSELF: NOT AT ALL
10. IF YOU CHECKED OFF ANY PROBLEMS, HOW DIFFICULT HAVE THESE PROBLEMS MADE IT FOR YOU TO DO YOUR WORK, TAKE CARE OF THINGS AT HOME, OR GET ALONG WITH OTHER PEOPLE: SOMEWHAT DIFFICULT
7. TROUBLE CONCENTRATING ON THINGS, SUCH AS READING THE NEWSPAPER OR WATCHING TELEVISION: NOT AT ALL
SUM OF ALL RESPONSES TO PHQ QUESTIONS 1-9: 14
8. MOVING OR SPEAKING SO SLOWLY THAT OTHER PEOPLE COULD HAVE NOTICED. OR THE OPPOSITE, BEING SO FIGETY OR RESTLESS THAT YOU HAVE BEEN MOVING AROUND A LOT MORE THAN USUAL: NEARLY EVERY DAY
SUM OF ALL RESPONSES TO PHQ QUESTIONS 1-9: 14
SUM OF ALL RESPONSES TO PHQ QUESTIONS 1-9: 14
6. FEELING BAD ABOUT YOURSELF - OR THAT YOU ARE A FAILURE OR HAVE LET YOURSELF OR YOUR FAMILY DOWN: NOT AT ALL
2. FEELING DOWN, DEPRESSED OR HOPELESS: NEARLY EVERY DAY
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
## 2025-07-16 NOTE — PATIENT INSTRUCTIONS
[facility] Food Resources*  (Call [employer]/211 if need more resources.)    [facility]  What they offer: Assistance with finding a food pantry, soup kitchen or resource near you.  Website: https://[employer].org/get-help/community-resources/  Provide instructions for assistance with SNAP ([employer] [place]) application on this site.   [facility]  Services:  Food Hub in [place], emergency assistance, and a pantry   Website: https://[employer].org/need-food/    [facility], [address] [place] [address]  What they offer:  Provide food to families in need  Monthly on the third Saturday from 10am - 12pm  [phone]    SNAP (formerly [employer] [place])  What they offer: SNAP is used like cash to buy eligible food items from authorized retailers.  Apply for benefits online: https://www.[employer]/  Apply for benefits by telephone: [phone]  Apply for benefits at local [facility] of      Meals on Wheels   What they offer: Meals on Wheels is a program that delivers meals to individuals age 60+ at home who are unable to purchase or prepare their own meals.   Website: https://feedmore.org/how-we-help/meals-on-wheels/  Requirements can vary by program and areas served.   To apply:  Contact [facility]: [phone] (Mon -Fri 8:30 a.m. to 4:30 p.m.)  Coverage Area:  [facility], [facility], [facility]  Website: [email or web address].org/contact-us/  Contact [phone] (Mon - Fri 8:00am to 5:30pm)  Coverage Areas: [facility], [employer], [facility], [facility], [place].    [facility]  What they offer:  [employer] provides comprehensive services to the disadvantaged/low-income community, homebound seniors and homeless in [facility]

## (undated) DEVICE — PROCEDURE KIT FLUID MGMT 10 FR CUST MAINFOLD

## (undated) DEVICE — RADIFOCUS OPTITORQUE ANGIOGRAPHIC CATHETER: Brand: OPTITORQUE

## (undated) DEVICE — GLIDESHEATH SLENDER STAINLESS STEEL KIT: Brand: GLIDESHEATH SLENDER

## (undated) DEVICE — BAND COMPR L24CM REG CLR PLAS HEMSTAT EXT HK AND LOOP RETEN

## (undated) DEVICE — SET FLD ADMIN 3 W STPCOCK FIX FEM L BOR 1IN

## (undated) DEVICE — DRAPE,ANGIO,BRACH,STERILE,38X44: Brand: MEDLINE

## (undated) DEVICE — PACK PROCEDURE SURG VASC CATH 161 MMC LF

## (undated) DEVICE — PRESSURE MONITORING SET: Brand: TRUWAVE